# Patient Record
Sex: FEMALE | Race: BLACK OR AFRICAN AMERICAN | NOT HISPANIC OR LATINO | Employment: UNEMPLOYED | URBAN - METROPOLITAN AREA
[De-identification: names, ages, dates, MRNs, and addresses within clinical notes are randomized per-mention and may not be internally consistent; named-entity substitution may affect disease eponyms.]

---

## 2023-07-03 ENCOUNTER — VIRTUAL VISIT (OUTPATIENT)
Dept: PSYCHIATRY | Facility: CLINIC | Age: 33
End: 2023-07-03
Payer: COMMERCIAL

## 2023-07-03 DIAGNOSIS — F29 PSYCHOSIS, UNSPECIFIED PSYCHOSIS TYPE (H): Primary | ICD-10-CM

## 2023-07-03 NOTE — PROGRESS NOTES
[facility] Clinician Phone Screen  A Part of the [facility] First Episode of Psychosis Program    Patient: Laura Gibbons (1990, 32 year old)     MRN: [id number]  Date:  7/03/23  Clinician: LORELEI Woo     Length of Actual Contact: Start Time: 11:35; End Time: 12:02    Use the following script to set-up intentions for this appointment:    You may have heard this when you scheduled this phone call but as a reminder, this 30 minute appointment is used to review a few questions to determine if you would be a candidate for our First Episode Psychosis Programs assessment process. Our assessment process includes 2-3 additional  appointments, spread out over several weeks. Eligibility for enrollment and our treatment recommendations will be discussed after those appointments. By the end of the phone call, I hope to determine if you/Pt is eligible for the full assessment appointment process, which we will schedule at the end of this call. This is not an intake and enrollment is not guaranteed.     We also want to be  transparent about our waitlist. Recent wait lists have been 1-2 months.   With knowing this information, do you still want to proceed with this phone screen? Yes    Reviewed limits to confidentiality: Yes    Use the following script to describe limits to confidentiality if meeting with the patient/family:   Before we get started I always like to review confidentiality. All of the information you share will be kept confidential. Only with permission will information be released to anyone outside of the organization except when required by law. Those legal exceptions are if there is clear and imminent danger to you or someone else, if there is a reasonable suspicion that child or elder is being abused, or if there is a court order. Do you have any questions about confidentiality before we get started?    Phone screen completed with:  Laura; Relation to the patient: [phone]  If we move forward with  "scheduling appointments, who should we coordinate appointments with? Laura, Phone: [phone]  Is it OK to leave a detailed voicemail? Yes  If we move forward with scheduling appointments via video, where would you like the link sent?    Demographics: (Verify the following is correct. If incorrect, edit in Demographics.)  What is patient's phone number: [phone] (home)   Patient's Address?  [address] [place] [address]  Patient's Email Address?  [email or web address]    If caller is not the patient, is the patient aware of the referral? Yes    If age 18 or older, does the adult patient consent to this referral and is the patient willing to attend appointments? Yes    Diagnostic Information:  Briefly, in a few sentences, what would you/the patient like to be seen for?  \"I'm hoping to be seen for my health, to just make sure that I'm healthier and able to communicate with others. I want to get my life back in order.     Have you/the patient experienced symptoms of psychosis? Yes  If yes, for how long and please describe? \"It was just starting in March 2023, but now I'm on meds and doing much better. I was admitted because I couldn't function anymore.\"   In particular, are you/the patient experiencing/have experienced any of the following?   -Changes in thinking (odd ideas, grandiosity, suspiciousness, difficulty concentrating): Yes - \"I went through that in the past, but not right now. I just had some anxiety, worried about people and my safety. It was at the hospital earlier this year.\"   -Changes in perception (auditory/visual/tactile/olfactory abnormalities): No  -Changes in speech (disorganized communication, tangential speech): Yes - \"I experienced that in the hospital because I had memory loss.\"   -Emotional changes (depression, mood swings, irritability, flat affect): Yes - \"I was facing that earlier.\"   -Dramatic reduction of overall functioning: Yes - \"I couldn't do my business or function " "fully.\"     What mental health diagnosis(es) have you/the patient received in the past?   Schizophrenia catatonic     In particular, have you/the patient ever been diagnosed with:   -Autism spectrum disorder: No   -Borderline personality disorder: No    Any history of developmental delays?  No    If yes, please describe:     Are any of the following applicable to the patient?   -IQ below 70? No  -Non-verbal due to developmental delays? No  -Living in a group home because of developmental disability? No  -Has a guardian because of a developmental disability? No    Service History:   Are you/the patient taking antipsychotics or have you/the patient taken antipsychotics in the past? Yes            If yes, for how long cumulatively? Invega and Cogentin, started taking in May 2023    Are you/the patient seeing any mental health providers currently? Yes              If yes, who/where? Therapist and Psychiatry [facility]    Specifically, have you/the patient had an ACT team in the past?  No    Have you been enrolled in a first episode psychosis outpatient program before, such as [facility], HOPE Program at [facility], or at the [facility] in [place]? No     Any current thoughts of harming yourself or others? No    What services are you/the patient interested in receiving in our clinics?   Medication management: Yes   Individual therapy: Yes   Family therapy: Yes   Group therapy: Yes   Work/school support: Yes    Research:  If talking with the patient directly, would you be interested in learning more about research opportunities you may qualify? If so, we can connect you with a team member for more information. Yes      Plan:  Is this patient eligible for a comprehensive assessment with First Episode of Psychosis Services? Yes    Eduardo Sanchez,     Thank you for your interest in our first episode of psychosis services. As discussed, it seems you might be eligible for one of " our first episode of psychosis programs. Therefore, you were offered a series of assessment appointments (details below). Please note, enrollment in one of our first episode psychosis programs is dependent on the outcome of these assessments. These appointments are not considered an intake into a program and enrollment is not guaranteed. Additionally, if eligible there might be wait times for enrollment into our programs. Wait times would be discussed with you during your feedback appointment. If you already have established care with community providers, continue to work with those providers until you have appointments with our program. If you do not have care established elsewhere, please consider establishing care in the interim. If you requested information on other community resources outside of our organization, those are listed below.     First Episode Psychosis Assessment Appointments:     -Diagnostic Assessment appointment with Nicole Amador and Josie Day on 7/18/23 at 9:30 for 90 minutes via video. If via video, the video visit link will be sent via this email address.  A diagnostic assessment is a comprehensive structured assessment that is completed with everyone seeking services in our clinic. It helps us get to know people and determine what services might be the best fit. During this appointment you and the provider will review your social, medical, and psychiatric history.     -Feedback appointment with Nicole Amador for 30 minutes will be scheduled at your Diagnostic Assessment appointment.   A feedback appointment is where you will hear about professional impressions and treatment recommendations.     In preparation for future appointments we ask that all behavioral health records be faxed to [phone] (for adult patients) / [phone] (for child/adolescent patients).    If you have follow-up questions or need to cancel/reschedule appointments, please contact one of our clinics at  [phone] (for adult patients) / [phone] (for child/adolescent patients).    As a reminder, if you need urgent help, please call your local crisis number, 911, or go to your nearest ED. A list of crisis hotlines has been included below. Additionally, we have a new mental health emergency area at [facility] called [facility] that is very calming and helpful if you need additional support. ([address] [place] [address] [phone]). This is a team of mental health providers who can assess your needs and connect you with resources and medication in a timely manner and provide transitional support until obtaining a consistent provider ([facility]- [phone]).    Crisis Resources:  Crisis Hotlines:  National Suicide Prevention Lifeline at 988  Throughout  [place]: call **CRISIS (**658217)  Crisis Text Line: is available for free, 24/7 by texting [place] to 912096  With Lifeline Chat as option - connect with a counselor for emotional support and other services via web chat https://suicidepreventionlifeline.org/chat/    [place] (Child & Adult): [phone]   [place] (Child & Adult): [phone]   [place] (Child & Adult): [phone]   [place] - Child: [phone]   [place] - Adult: [phone]  [place] - Child: [phone]   [place] - Adult: [phone]  [place] (Child & Adult): [phone]   [place] (Child & Adult): [phone]  [facility] Crisis Response ([facility], [place], [place], [facility] and [facility]): [phone]    [facility] Crisis Services Fact Sheet: [email or web address]/wp-content/uploads/sites/[email or web address]/06/Gciley-Xquapc-Ktgdznvj__[email or web address].pdf    Behavioral Health Emergency Room  [facility]  Phone: [phone] (Emergency Room)  Address: [address] [place] [address]    [facility] EmPATH (Emergency Psychiatric Assessment, Treatment, and Healing) is like an emergency room mental health unit. [facility] is an  innovative approach to emergency mental health care that is designed to guide people safely through a crisis while helping them build coping skills for the future. The unit is designed for acute psychiatric patients to receive assessment and evaluation in a therapeutic and least restrictive setting.    Complementing the emergency department, the new adult EmPATH unit provides a calm and comforting environment, allowing the movement and human interaction that is vital in the first 24 hours of treatment. After a short medical evaluation in the emergency department, patients come to a calming, living room-style open space with comfortable recliners and self-serve refreshment stations. Open nursing stations and unlocked rooms create an atmosphere of trust and allyship with the staff, while the mix of daylighting, views to nature, or appropriate imagery establishes a sense of hope. In addition to providing a conducive environment for treatment, the EmPATH unit provides a gentle and benign setting for the care team to constantly evaluate a patient and discharge them with an appropriate treatment plan.    JOE WooSW

## 2023-07-18 ENCOUNTER — VIRTUAL VISIT (OUTPATIENT)
Dept: PSYCHIATRY | Facility: CLINIC | Age: 33
End: 2023-07-18
Payer: COMMERCIAL

## 2023-07-18 DIAGNOSIS — F29 PSYCHOSIS, UNSPECIFIED PSYCHOSIS TYPE (H): Primary | ICD-10-CM

## 2023-07-18 DIAGNOSIS — F39 UNSPECIFIED MOOD (AFFECTIVE) DISORDER (H): ICD-10-CM

## 2023-07-18 NOTE — PROGRESS NOTES
"[facility]  Diagnostic Assessment  A part of the [facility] First Episode of Psychosis Treatment Program    Laura Gibbnos N# [id number]   Age: 32 year old YOB: 1990     Date:  7/18/23    Video- Visit Details   Type of service:  video visit  Video start time: 0931  Video end time: 1035  Originating location (patient location):  [facility]   Location- Home  Distant Site (provider location): HIPAA compliant location Off-site  Platform used for video visit:  Secure real time interactive audio and visual telecommunication system via [employer]     Attendees: Patient attended the session alone  : Aisha English    Contributors to the Assessment   Chart Reviewed.   Interview completed with Laura.    Collateral information obtained from chart review.    Diagnostic assessment today was completed by Nicole CHUA.     Chief Complaint   \"I want to be connected to programs that can help me. I know you could potentially help me and I want a community of people to get care that I need. I'm hopeful that there could be a program that works for me, virtually on Tuesdays and Thursdays. I've heard there are resources and education. I want to be able to find something that.\"    History of Present Illness    Laura Gibbons is a 32 year old patient who prefers the name Laura and uses pronouns she, her. Laura presents for evaluation at [facility] for services to treat first episode psychosis.  Discussed limits of confidentiality today and status as a mandated .     Gender/pronouns: female she/her  Race:  (Swiss)  Sexual Orientation: straight    Therapist: Therapist at [facility]  PCP: No primary care provider on file.  Other Providers: Psychiatry at [facility]    Referred by Therapist referred for evaluation of possible psychosis  .     patient provided assessment details, they were a limited historian.     Per patient's report:  Laura does not currently feel depressed and reports never " "having felt depressed. She has never felt depressed or had a diagnosis of depression in the past. Laura reports that this first episode of psychosis is her only event of mental illness. She does not have any suicidal ideation and has never had any. She has never had a suicide attempt. There is no history of mental illness known in the family. No history of eduardo reported. No trauma reported, however she was placed in restraints multiple times in the hospital and had to do electroconvulsive therapy.     She stated that she was brought into the hospital for acting \"out of the ordinary\" and brought this up when Mi Day asked about heightened activity in the Bipolar section of the MINI questionairre. When discussing the psychosis section, Laura reported that she \"was told she was paranoid\" but she does not remember but she is currently not paranoid. She does not remember much of the episode at all. She was told that she heard voices in the hospital. She is not currently hearing AH or VH, but she was told that in the hospital she did.     Her medication she began in the hospital has been very helpful. She reports no psychosis sx currently. She has never experienced psychosis before. She was told that she had bizarre behavior a few days before she was hospitalized, not 2-3 weeks before as was reported in hospital notes. 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     Laura would like to work with someone of shared identities but is open to working with anyone.      Per medical records:  Efren Davis MD - 05/15/2023 10:20 AM CDT  Formatting of this note is different from the original.  Images from the original note were not included.  [facility]   PSYCHIATRY DISCHARGE SUMMARY    Admission Date and Time: 4/8/2023 8:56 PM   Discharge Date: 5/15/2023  Attending Practitioner: Ryan, " Efren GRIDER MD    Discharge Diagnoses   Unspecified psychotic disorder  Agitated catatonia    Reason for Hospitalization   History of Present Illness taken from admission note:   Laura Asif is a 32 y.o. Female who has been admitted for psychosis from home via ED. The patient is being admitted on a 72 hour hold status. The patient carries no previous past mental health diagnosis. Symptoms of the patient's current psychiatric condition include paranoia, apparent responding to internal stimuli, bizarre accusations against relatives.     Per ED notes: The patient is a 32 y.o. female who comes to the ED with friend/family.  Per ED Triage RN; Pt's family came to triage this morning (4/8) around 0830 stating that pt has been acting abnormal, talking to things that are not there, yelling out the window, and other behaviors. Pt refusing to leave car, I approached her in the car, pt states she will come into the ER, walked into the waiting room with steady gait, A&O x4, flat affect and withdrawn. Once walking in she turned around and walked back to the car and got inside. Refusing to talk with staff after that. Family denies suicidal or homicidal behavior. Family kept car parked out front of ED for multiple hours, triage officer approached car to check on them. Officer reported that pt was crawling over family attempting to get out. Per officer the pt got out of the car and was acting erratic. Pt was in the waiting room vestibule trying to get away from the officer, pt is now refusing to speak, will sit in a wheelchair then attempt to get up when moving. Pt was placed in a bed and brought back to a room for a provider assessment.    Per ED Provider, Amina Zafar PA-C; HPI Laura Asif is a 32 y.o. old female, with no diagnosed medical problems, who was brought in by 2 brothers with a report that she has had strange behavior for the past 2-3 weeks but potential since November of 2022 when she may have had a seizure and hit  her head. They brought her here this morning by private vehicle at approximately 8:00 am today and have been trying to get her to come in from the car since that time. She was brought back from triage, after she attempted to get out of the vehicle, restrained [facility] and [facility] [place] [facility]. The triage RN reports that she was speaking and answering questions initially, but she is not willing to answer question on my initial evaluation. She was placed in a restraint chair and brought into Pod G Rm 8. She gave yes an no answers to questions. She denied SI, HI, AH, or VH. She denied HA, chest pain, SOB, abdominal pain, nausea, vomiting, diarrhea, cough, or fever.    Collateral was obtained from her brothers. They report that her  reported that she had a seizure while out of state for a wedding and that she hit her head on a bathtub of toilet. They did not thinks she had a LOC. She declined to be evaluated for this. She has had some behavior changes since then (November 2022) and over the past 2-3 weeks they have witnessed her pacing, praying a lot, saying things that don't make sense, saying her friends are out to get her, and accusing them of being pedophiles. She has attempted to leave the house in the middle of the night last night and other nights. She made a statement about having a demon inside. She isn't sleeping. The  is at home with their 4 y.o. and 1 y.o. children. The brothers report that the  said that she hasn't been menstruating and is concerned about this. She is breast feeding. No concerns about harm or neglect of the children. They deny any alcohol or recreational drug use history or concerns. Report a FH of another brother who there is concern for psychosis.    Per ED SW Colleague LORELEI Medina: Of note, pt's presentation in the ED has been agitated and not directable. She has required use of the restraint chair twice. She is shouting at people can calling  "them pedophiles saying \"I know what happened to those kids.\" She appears distressed and is visibly shaking while sitting in the restraint chair. She has required forced medications on two occasions. She is still waiting for medical clearance and has been unable to be assessed by social work at this time.     Pt has been medically cleared. Pt is unable to participate in a crisis assessment.    Information from collateral (include names and phone numbers)   Spoke in person with pt's brothers who accompanied her to the ED. Maria Guadalupe ([phone]) and Luis Miguel ([phone]). Maria Guadalupe says this morning pt agreed to come to the hospital. However, when they arrived she refused to get out of the car. He says they were in the car in front of the hospital for several hours until finally pt was trying to get out of the car (child locks in the back were on) and security saw the car and came to assist pt inside.    Maria Guadalupe says for the past couple weeks pt's  has been telling pt's brothers about some concerns with pt's behaviors. He says pt has not been sleeping as much, pacing, praying a lot, talking about visions, and being suspicious of family. Maria Guadalupe says he went to see pt yesterday and she was calling her siblings \"pedophiles\" and saying negative things about them that are no true. Maria Guadalupe says later pt would say she never said those things. He says she has seemed suspicious of people and like they are not who they say they are. She has also been more religiously focused and talking about demons.     Maria Guadalupe says throughout the night pt was pacing and trying to leave the house. He says around 4a she finally started to calm and then at 8a she agreed to come to the hospital for testing.     Maria Guadalupe mentions that in November pt had a \"seizure.\" He does not know more about this but says it was witnessed by pt's . He says pt has no history of seizures and has not had any similar episodes since. However, pt " "may have hit her head twice during that episode.     Maria Guadalupe says there are no concerns for pt using alcohol or drugs. He says there is no known family mental health history though there may be a brother with some developing mental health concerns as well. He says pt lives with her  and their two children (4 and 1 year old) and pt continues to care well for both children.  Angelina M Antwon [facility] 4/8/23 3:28PM    Spoke via phone with pt's  Indra ([phone]).  Indra confirms that pt has not been sleeping well. He says for about the past week he is not sure if she is sleeping at all. He says she will stay up late writing and then if she ever goes to bed she is awake and up and moving again before he wakes. Indra says pt is also not eating much, less than normal though typically she does not eat too much anyway.    When asked to further describe the \"seizure\" episode in [place] Indra describes the following. He says they were in a hotel room for a wedding. Pt started to grab at her throat, like she was choking, and made sounds like she was gasping for air. Pt then kind of fell to the floor and bumped her head on the toilet/tub. He says a moment later pt got up and said \"I'm ok I'm ok.\" She told Indra that \"it was spirits trying to choke me but I overcame them.\" He says because she seemed ok and was not bleeding they did not seek medical attention at that time.     Indra says as a culture they are Yarsani and spiritual and it is not uncommon for people to have visions related to their spirituality. He says for this reason her comments did not flag anything to him at the time. However, since then she has talked more about visions and is praying more frequently and it does seem like she is more religiously preoccupied and focused.    Indra says he is concerned that it seems like pt cannot separate reality from visions. He says pt will talk about seeing people in the house. She will try to run out of the house " "for seemingly no reason. She will make comments like \"Oh I see now how everything is connected\" and then call family and friends by different names. She has made comments about getting out of the house because it is burning when this is not the case.    Indra says he is concerned for pt. He does not know what is going on but wants her to get help. He is supportive of whatever is recommended from the ED today.     On staff exam this morning on NE8, pt refuses interview, demanding new doctor. Briefly denies any immediate danger from physical sx or SI/HI and requests writer leave. Appears in no acute distress and grossly neurologically intact. Reports adequate sleep and intake.     Was more cooperative with female  staff on unit--  \"Patient declined to sign ROIs for her brothers and . Patient reported her sister is coming to visit her. Writer plans to meet with the sister for additional information, if possible.  ...  Writer met with patient at bedside. Patient appeared bright and receptive. Writer attempted to ask patient about lactation needs, as the care team wanted to work with her on this while she was admitted. Patient reported her sister is coming to visit her and that she wanted to talk to her first, before anything happens. Writer attempted to review some assessment questions with patient related to symptoms and how she got to [facility]. Patient reported she doesn't remember anything that happened yesterday and that she wanted to talk with her sister before talking to anyone else.\"    Chemical Dependency History, Past Psychiatric History, Family History and Past Medical History: See Admission Note completed by Jareth Tellez on 4/9/2023.    Lab Studies, EKG & other diagnostic testing   Patient had a mild anemia and during the height of her agitated. Had some elevated CK. See the laboratory section of the chart for the remainder of laboratory testing  I attest that this patient received metabolic screening " (including lipid panel, BMI, blood pressure, fasting glucose or HgbA1C) in the last twelve months.    Hospital Course   The multidisciplinary treatment team met (RN, OT, , Physician) on a daily basis to discuss patient care and treatment planning. The psychiatric inpatient setting provided close nursing supervision and access to multiple treatment modalities and programming (group therapy, OT, one-to-one therapy.) Patient support systems such as family, , and other care providers were contacted as appropriate for collateral information and treatment planning.    1. Medication Trials and Changes: Patient initially admitted with a diagnosis of an unspecified psychosis and placed on antipsychotic olanzapine. She did initially show some reduction in paranoia but continued to exhibit agitated bizarre behavior which was consistent with an agitated catatonia. Antipsychotic was discontinued and trial of lorazepam was begun. At approximately the same time Zyprexa was discontinued for agitated catatonia the patient had a very prolonged QT interval. Patient did improve initially with lorazepam trial but eventually became increasingly agitated and was persistently in seclusion or restraints. As a result emergency ECT was begun. Patient had very rapid response to ECT and became much more calm. She had a total of 6 ECT treatments. She continued to show some paranoia and Risperdal was started, because of her prolonged QT in the past Zyprexa was not restarted. She did have fairly significant cognitive side effects related to the ECT which resolved during her hospitalization. She also seemed to have a fair amount of slowing motorically related to Risperdal. She had essentially no memory of the psychotic symptoms which led to hospitalization or the early part of her hospitalization where she was the most ill. Throughout her hospitalization off and on she was adamant that there was nothing wrong with her and  was insistent on going home. Because of her poor insight it was felt that a long-acting injectable may help with compliance after discharge. Both the family and the patient agreed to this and she was begun on Invega Sustenna. Oral Risperdal was discontinued on the day of discharge after she received her 2nd Invega Sustenna injection. Because of the duration of her hospitalization and because of the significant cognitive fact Ativan was tapered down during her hospitalization at the time of discharge she was taking a 0.5 mg 3 times a day she was discharged with 14 days of Ativan.  2. Legal Status: Committed  3. Group Attendance: Initially very poor but eventually attended most groups and cause cooperative  4. Level of cooperation and Medication adherence: Initially was agitated and resistant but again eventually became quite cooperative and calm  5. Change in psychiatric symptoms: Presented with profound psychosis and fairly significant agitation after ECT she had some amount of psychotic symptoms which seemed to resolved with the addition of Risperdal the time of discharge she had some slowing which was felt related to the Risperdal but showed no obvious signs of psychosis in the catatonia was completely resolved.  6. Discharge planning and coordination of care: Fairly frequent coordination with the family both in person and on the telephone related to her ongoing treatment and need for follow-up. Both the patient and her family were agreeable to outpatient psychiatric follow up including the long-acting injectable. During the patient's hospitalization her father did pass away. The family requested that the treatment team not inform her of his death until she could be at home with her family around. There was some discussion about the possibility of worsening symptoms related to this. Was discharged home with her . Family members were living in her home to help assist with the childcare  "responsibilities.    Tobacco: Cessation counseling not applicable. Patient does not use tobacco.    At the time of discharge, there appeared to be no evidence that the patient posed an imminent threat to self or others and a reasonable discharge plan was in place, we determined that the patient had achieved optimal medical benefit from hospitalization and was discharged with follow-up as detailed below.        Psychiatric Review of Systems (Completed M.I.N.I. for Psychotic Disorders: Yes)     DEPRESSION  Past 2 Weeks:  none  Past Episode:  none      SUICIDALITY: Current: No, risk Low  -reports 0% in response to \"How likely are to you to try to kill yourself within the next 3 months on a scale from 0-100%?\"  -denies current SI, denies intent and plan  -denies current SIB/Self Injurious Behavior  -denies current HI    LAURIE/HYPOMANIA  Current Episode:  none  Past Episode:  none    TRAUMA:  none    ALCOHOL & J. NON-ALCOHOL:  See below    PSYCHOSIS:   Present Symptoms:  none  Past Symptoms:  paranoia, delusions, odd beliefs per family/friends, auditory hallucinations, visual hallucinations, disorganized speech, disorganized behavior and negative symptoms (diminished emotional expression)    RULE OUT MEDICAL, ORGANIC OR DRUG CAUSES FOR ALL DISORDERS  During any current disorder or past mood episode, patient reports:  A. Substance use or withdrawal: No  B. Medical illness: No    Past Psychiatric History   Past diagnoses:   Schizophrenia catatonia    Past medication trials:   Invega and Cogentin, started taking in May 2023    Psychiatric Hospitalizations: one; 4/8-5/15/2023  Commitment: Yes , Current [facility] order: Yes   Electroconvulsive Therapy (ECT) or Transcranial Magnetic Stimulation (TMS): Yes ECT, during the hospitalization    Self-Injurious Behavior: Denies  Suicidal Ideation Hx: No  Suicide Attempt- #-:No, most recent- N/A    Violence/Aggression Hx: No    Outpatient Programs & Services [Psychotherapy, DBT, Day " "Treatment, Eating Disorder Tx etc]:   Current:   for [facility]  Therapist   Psychiatrist    Past:  None     Substance Use History (review CAGE-AID):   Caffeine: no caffeine     Tobacco: none  ETOH: never    Cannabis: none          Other Drugs: none  CD treatment hx: Laura has not received chemical dependency treatment in the past. Laura reports no problems as a result of their drinking / drug use.   Current sober supports include family and friends.    Based on the clinical interview, there are not indications of drug or alcohol abuse. Continue to monitor.   Discussed effect of substance use on overall health and how this may contribute to their mental health symptoms.         Social History:    Living situation: Lives in a house with kids and . She has two children, youngest is two. She feels safe at home.  Guns, weapons, or other means to harm oneself in the home? No  Pets at home? No     Relationships: Significant relationships include her , brother, sister, 's parents, mom, dad. Dad passed away very recently, which has been extremely difficult. She grew up being very close to her siblings. She felt safe in her home growing up. On Sundays, they would go to [employer] and have donuts. She has five siblings. She went to boarding school in [employer] which \"was kind of hard because she wasn't around her siblings and felt alone at moments\" but made friends and had good experiences.      Education: Laura's highest level of education is college graduate. BA in public health. Educational goals includes going back to school, hoping to connect to other people. She completed school with a 4.0.     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 Occupational goals include wanting to go back to work outside of her own business.    Finances: Financial considerations include working and family or friend.    Spiritual " "considerations: Patient identifies with camila community.  She is Lutheran.    Cultural influences: Laura describes their race as Black. Laura's primary spoken language is English. Laura identifies their sexual orientation as heterosexual, and their gender as female. Laura prefers female pronouns. She/her. Cultural considerations to take into account when providing treatment include her immediate family being Egyptian, Lutheran beliefs, being a woman, somewhat preferring a provider with shared identities. She was born in [facility], her parents are Egyptian.    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  Exercise and nutrition habits include biking, stretching, and walking.  Self identified strengths are being resilient, doesn't give up, being really smart.  Coping mechanisms include praying, meditating, exercise, stretches.     Legal Hx: No: Patient denies any legal history     Trauma and/or Abuse Hx: There are no indications or report of: significant losses, trauma, abuse or neglect. Issues of possible neglect are not present.      Hx: No       Developmental History:   Laura was born without pregnancy or delivery complications.          Family History:   Family history of: none  denies history of completed suicides.         Past Medical History:    There is no problem list on file for this patient.    Primary Care Physician: No primary care provider on file.  Last PCP Appointment Date: unknown    Medical problems: No  Surgical history: This patient has no significant past surgical history  History of seizures or head trauma/loss of consciousness? No  Allergies: Not on File       Medications:   Per chart:  No current outpatient medications on file.       " Most Recent Labs & Vitals (per EPIC):   There were no vitals taken for this visit.    RECENT BRAIN IMAGING:  hospital  Additional Screening / Assessment Measures   PHQ9 was not completed today, 7/18/23  GAD7 was not completed today, 7/18/23  CAGE-AID was not completed today, 7/18/23    Mental Status Exam   Alertness: alert  and oriented  Attention Span and Concentration:  Normal  Appearance: awake, alert and adequately groomed  Behavior/Demeanor: cooperative, pleasant and calm, with good eye contact   Speech: normal and regular rate and rhythm  Language: good. Preferred language identified as English.  Psychomotor Behavior:  normal or unremarkable  Mood: description consistent with euthymia  Affect: appropriate and in normal range and mood congruent; was congruent to mood; was congruent to content  Associations:  no loose associations  Thought Process:  unremarkable  Thought Content:  no evidence of suicidal ideation or homicidal ideation, no evidence of psychotic thought and Appropriate to Interview  Perception: none  Insight: excellent  Judgment: excellent  Impulse Control:  intact  Cognition: does  appear grossly intact; formal cognitive testing was done    Safety: There are notable risk factors for self-harm, including past psychosis. However, risk is mitigated by commitment to family, cultural beliefs, spiritual/Taoism beliefs, sobriety, absence of past attempts, ability to volunteer a safety plan, history of seeking help when needed, future oriented, no access to firearms or weapons, denies suicidal intent or plan, no family history of suicide and denies homicidal ideation, intent, or plan. Therefore, based on all available evidence including the factors cited above, Laura does not appear to be at imminent risk for self-harm, does not meet criteria for a 72-hr hold, and therefore remains appropriate for ongoing outpatient level of care.  Suicidality risk appeared Low.  The patient convincingly denies  suicidality on several occasions. There was no deceit detected, and the patient presented in a manner that was believable.    Safety plan was not discussed and included review of crisis phone numbers. Recommended that patient call 911 or go to the local ED should there be a change in any of these risk factors..   CRISIS NUMBERS Emphasized:  [facility] [phone] (clinic)    [phone] (after hours)  National Suicide Prevention Lifeline: 5-000-475-TALK (730-365-6559)  [employer]/resources for a list of additional resources (SOS)            [facility] - [phone]   Urgent Care Adult [facility]-[phone] mobile unit/ 24/7 crisis line  [facility] -[phone]   COPE 24/7 [place] Mobile Team -[phone] (adults)/ [phone] (child)  Poison Control Center - [phone]    OR  go to nearest ER  Crisis Text Line for any crisis 24/7 send this-   To: [phone]   [facility]) [facility]  [phone]    Provisional Psychiatric Diagnoses   A provisional diagnosis of unspecified psychosis, unspecified mood disorder seems appropriate.   Rule outs to consider include Schizoaffective disorder vs bipolar disorder with psychotic features vs Psychosis due to a medical condition (Seizure) vs Postpartum psychosis    Assessment   Laura is a 32 year old  Black or  Not  or  female with psychiatric history of psychosis who presented for an assessment of psychiatric symptoms by the First Episode of Psychosis program.  Laura was referred by her therapist at [facility].   She has a history of one psychiatric hospitalization(s).  There is no family history of ment.      Today, Laura presents as a rather vague historian who seemed unsure of how to respond to many questions with good insight in their current circumstances. Prodromal symptom timeline is challenging to precisely name due to Laura's lack of memory  from the event and lack of collateral information. Laura reports first onset of psychiatric symptoms at age 32, and psychotic symptoms at age 32. She reports first onset of psychotic symptoms at age 32, three and a half months prior to today's assessment.  The above duration of untreated psychosis was approximately several days per patient report, but collateral report is possibly eight months.  Based on today's assessment past symptoms of mental illness represent unspecified psychosis and unspecified mood disorder. Presenting symptoms appear to include paranoia, catatonia, disorganized movement and speech, odd beliefs. 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  Precipitating factors to aforementioned symptoms seem to be aforementioned stress. Substance use does not seem to be a present concern. She does not admit the aforementioned symptoms are worse with substance use.     Laura s reported symptoms could be consistent with an episode of major depression with psychotic features, bipolar disorder with psychotic features, schizoaffective disorder or a manifestation of positive/negative symptoms in schizophrenia. As this is reportedly Laura s first psychotic episode and she is unable to give a clear history, more time and information is required to distinguish between these conditions. Diagnosis seems supported by patient report, collateral records, and the MINI assessment tool.  Differential diagnosis of Schizoaffective disorder vs bipolar disorder with psychotic features vs Psychosis due to a medical condition (Seizure) vs Postpartum psychosis.  Further diagnostic clarification is needed.  There are medical comorbidities which impact this treatment [ECT and major medical problems] and should continue to be monitored.     Laura has notable strengths, including high intelligence, high academic achievement,  emotional intelligence, good social skills, capacity for insight, motivation for treatment, strong engagement in health care, proven resilience through adversity, opportunities to live a meaningful life, optimism that change can occur, participation in [facility]/spiritual group, healthy diet, community involvement, regular engagement in exercise/physical activity, good coping skills, emotional self-regulation, good problem-solving skills, high self esteem, high distress tolerance, sense of belonging, high quality social relationships, proactive approach to problem-solving, empathy and kindness to others. Due to these strengths, I feel optimistic that Laura will have a positive treatment outcome and I think Laura has the potential to find mental well-being.  Psychosocial factors impacting treatment include grief.  Laura  has evidence of functional impairment including difficulty with work. Goal is to increase their functioning detailed above and assist Laura to make progress towards their goals.  Laura identified the following factors that will help them succeed in recovery include community involvement, exercise routine, camila / spirituality, friends / good social support, family support, insight, intelligence, positive work environment, motivation, strong social skills and work ethic. Things that may interfere with their success include unemployed.     Laura may meet criteria for the psychosis services offered through [facility]. This writer will provide verbal and/or written information about recommended services and programs in the context of treating psychosis during our feedback session next week.     Laura agrees to treatment with the capacity to do so. Agrees to call clinic for any problems. The patient understands to call 911 or come to the nearest ED if life threatening or urgent symptoms present. Please note, writer did not receive all pertinent medical records as of the time of this assessment. Laura did not  "sign BYRON's for additional records.    Billing for \"Interactive Complexity\"?    No    Plan   Next steps include intention of completing a continued multi-disciplinary assessment utilizing today's evaluation. The expertise of a PharmD, Psychologist, and Psychiatric consultation may be next steps. Informed Laura that if deemed appropriate for the First Episode of Psychosis services, care will be provided with goal of reducing distressing symptoms and improving functional recovery.    Medication Management: Laura is not in need of Medication Management.  Medications will be addressed further during an MTM visit and new patient medication evaluation.  Continue to follow recommendations of current outpatient prescriber until recommendations are provided.     Therapy: Laura was interested in meeting with a therapist. Laura would benefit from therapy.   Laura was and Family was interested in participating in the Family PsychoEducation Program.     Supported Employment & Education: Laura is in need of employment and education support.     Case Management: Laura is followed by a .  Case Management is not an identified need at this time. This writer will assist in short-term case management support as needed until care is established with ongoing providers.     Other Psychosocial Supports: Laura is interested in the  Young Adult Group.  Family would benefit from  Family Psychoeducation & Support Group.    Medical Referrals: Neurology     Referral information for the above mentioned supports will be discussed further at our feedback visit.   Without the recommended intervention, Laura is likely to experience possible increase in psychotic symptoms requiring hospitalization.     TREATMENT RISK STATEMENT:  The risks, benefits, alternatives and potential adverse effects have been discussed and are understood by the pt. The pt understands the risks of using street drugs or alcohol. There are no medical " contraindications, the pt agrees to treatment with the ability to do so. The pt knows to call the clinic for any problems or to access emergency care if needed.  Medical and substance use concerns are documented above.     PROVIDER: Nicole CHUA  SUPERVISOR: Lennie MOSELEY

## 2023-07-21 NOTE — PROGRESS NOTES
RAMP DA Consultation Outcome    Patient Name: Laura Webb    Diagnostic Assessment Date: 7/18/23     Discussed information gathered in the diagnostic assessment process in multidisciplinary consultation on 7/21/2023 for the purpose of preparing the DA clinician for a future feedback session.     Diagnostically: A provisional diagnosis of unspecified psychosis, unspecified mood disorder seems appropriate.   Rule outs to consider include Schizoaffective disorder vs bipolar disorder with psychotic features vs Psychosis due to a medical condition (Seizure) vs Postpartum psychosis.    Treatment Recommendations:  - [facility] NAVIGATE (Schedule enrollment with Karen Cao during feedback visit)  - [facility] HOPE Program (To schedule an intake or request additional information, please call [phone]. Fax: [phone])  - [facility] (send referral form to: [email or web address] / Fax [phone] / Phone [phone])  - [facility] (Referral form: [email or web address])   - Neurology referral to assess and give treatment recommendations for reported seizure history    -[facility] (various levels of care) ([email or web address])    As a reminder, the smarphrase FEPRESOURCES identifies psychosis-specific resources and referrals in the community outside of [facility].     LORELEI Monson

## 2023-07-25 ENCOUNTER — VIRTUAL VISIT (OUTPATIENT)
Dept: PSYCHIATRY | Facility: CLINIC | Age: 33
End: 2023-07-25
Payer: COMMERCIAL

## 2023-07-25 DIAGNOSIS — F39 UNSPECIFIED MOOD (AFFECTIVE) DISORDER (H): ICD-10-CM

## 2023-07-25 DIAGNOSIS — F29 PSYCHOSIS, UNSPECIFIED PSYCHOSIS TYPE (H): Primary | ICD-10-CM

## 2023-07-25 NOTE — PROGRESS NOTES
[facility] Clinician Feedback Session  A Part of the [facility] First Episode of Psychosis Program    Patient: Laura Webb (1990, 32 year old)     MRN: [id number]  Date:  7/25/23  Clinician: Nicole Amador      People present:   Writer  Client: Yes     Length of Actual Contact: Start Time: 1300; End Time: 1318     Location of contact:  Originating Location (patient location): Pt home, located in [place], [place]  Distant Location (provider location): Home office, located in [place], [place], using appropriate privacy considerations and procedures  Mode of communication: Amwell  Diagnostically: A provisional diagnosis of unspecified psychosis, unspecified mood disorder seems appropriate.   Rule outs to consider include Schizoaffective disorder vs bipolar disorder with psychotic features vs Psychosis due to a medical condition (Seizure) vs Postpartum psychosis.     Treatment Recommendations:  - [facility] NAVIGATE  - [facility]   - [facility]  - [facility]   - Neurology referral to assess and give treatment recommendations for reported seizure history    Laura Webb is currently participating in outpatient therapy, psychiatry, and case management services with [facility]. They do seem appropriate for [facility] FEP services. This writer has provided clinical impressions, verbal and/or written information about [facility] First Episode Pscyhosis programs in the context of treating schizophrenia spectrum and other disorders associated with psychosis. If Navigate program was identified as an option, this writer discussed Pt's ability to start NAVIGATE with later transfer of care if diagnostic clarity reveals a diagnosis other than a schizophrenia spectrum illness.      Is this patient agreeable to start services with First Episode of Psychosis Services? Yes   If Yes; which program? NAVIGATE      What services is the Pt interested in receiving in our clinics?   Medication  management: Yes   Individual therapy: Yes   Family therapy: Yes   Group therapy: Yes   Work/school support: possibly     Place Pt on waitlist(s)? Yes    With whom can someone follow up for scheduling, etc.? Laura rodrigues    Method of follow up communication preferred? phone      Nicole CHUA

## 2023-08-01 ENCOUNTER — VIRTUAL VISIT (OUTPATIENT)
Dept: PSYCHIATRY | Facility: CLINIC | Age: 33
End: 2023-08-01
Payer: COMMERCIAL

## 2023-08-01 DIAGNOSIS — F29 PSYCHOSIS, UNSPECIFIED PSYCHOSIS TYPE (H): Primary | ICD-10-CM

## 2023-08-04 ENCOUNTER — OFFICE VISIT (OUTPATIENT)
Dept: PSYCHIATRY | Facility: CLINIC | Age: 33
End: 2023-08-04
Payer: COMMERCIAL

## 2023-08-04 VITALS
HEIGHT: 63 IN | BODY MASS INDEX: 25.69 KG/M2 | WEIGHT: 145 LBS | HEART RATE: 87 BPM | TEMPERATURE: 98 F | DIASTOLIC BLOOD PRESSURE: 67 MMHG | SYSTOLIC BLOOD PRESSURE: 100 MMHG

## 2023-08-04 DIAGNOSIS — F29 PSYCHOSIS, UNSPECIFIED PSYCHOSIS TYPE (H): Primary | ICD-10-CM

## 2023-08-04 RX ORDER — PALIPERIDONE PALMITATE 156 MG/ML
INJECTION INTRAMUSCULAR
COMMUNITY
Start: 2023-06-11 | End: 2023-08-04

## 2023-08-04 RX ORDER — BENZTROPINE MESYLATE 0.5 MG/1
0.5 TABLET ORAL 2 TIMES DAILY
COMMUNITY
End: 2023-08-07

## 2023-08-04 ASSESSMENT — ANXIETY QUESTIONNAIRES
7. FEELING AFRAID AS IF SOMETHING AWFUL MIGHT HAPPEN: NOT AT ALL
4. TROUBLE RELAXING: NOT AT ALL
2. NOT BEING ABLE TO STOP OR CONTROL WORRYING: NOT AT ALL
3. WORRYING TOO MUCH ABOUT DIFFERENT THINGS: NOT AT ALL
GAD7 TOTAL SCORE: 0
1. FEELING NERVOUS, ANXIOUS, OR ON EDGE: NOT AT ALL
5. BEING SO RESTLESS THAT IT IS HARD TO SIT STILL: NOT AT ALL
GAD7 TOTAL SCORE: 0
IF YOU CHECKED OFF ANY PROBLEMS ON THIS QUESTIONNAIRE, HOW DIFFICULT HAVE THESE PROBLEMS MADE IT FOR YOU TO DO YOUR WORK, TAKE CARE OF THINGS AT HOME, OR GET ALONG WITH OTHER PEOPLE: NOT DIFFICULT AT ALL
6. BECOMING EASILY ANNOYED OR IRRITABLE: NOT AT ALL

## 2023-08-04 ASSESSMENT — PATIENT HEALTH QUESTIONNAIRE - PHQ9: SUM OF ALL RESPONSES TO PHQ QUESTIONS 1-9: 0

## 2023-08-04 ASSESSMENT — PAIN SCALES - GENERAL: PAINLEVEL: NO PAIN (0)

## 2023-08-04 NOTE — PROGRESS NOTES
"    New Patient Medication Management Evaluation  NAVIGATE Program   A Part of the [facility] First Episode of Psychosis Program     Laura Webb MRN# [id number]   Age: 32 year old YOB: 1990     Date of Evaluation: 8/04/23   60 minute evaluation    Video- Visit Details n/a in-person visit 08/04/23     CARE TEAM:  PCP- No primary care provider on file.    Laura Webb is   a 32 year old patient who prefers the name Laura and uses  pronouns she, her.   History was provided by: patient who was a fair historian.    CHIEF COMPLAINT                                                  \" Want to get better \"   PERTINENT BACKGROUND                        [most recent eval 08/04/23]   No significant psychiatric history until age 32. First episode psychosis with paranoia, VH, and disorganized behavior in the context of stressors (school, father's illness, work, parenting). Possibly exacerbated by head-injury (seizure-like episode with fall ~3 months prior). Initial workup unremarkable. Improvement with ECT and invega sustenna.     Psych pertinent item history includes psychosis , commitment, and ECT    HISTORY OF PRESENT ILLNESS                                                          Most recent history began April 2023, during which time she was involuntary psychiatrically admitted for ~5 weeks for bizarre behavior, talking to things that were not there, purposeless movements, and shouting out.  This was in the context of recent seizure-like activity (fall and bilateral shaking, Nov 2022) and increased stress (school, father's health, parenting, etc). While admitted, she received laboratory workup for first episode psychosis. Continued to exhibit paranoid and suspicious behavior while receiving olanzapine and lorazepam. Intermittently agitated and disorganized (disrobing). [facility] filed and patient received ECT  4/21 - 5/6. Discharged 5/14/23 with blunted affect and no overt psychosis.     Today she " "reports that she is feeling well overall.     Recalls that before recent admission \"[family] didn't have concerns about me, otherwise I would have gone to the hospital sooner. They didn't know that I had fell and had a seizure and if we knew I was having issues we would have gone in.\" Seizure November 2022 (in bathroom, following discussion/argument with family member). Remembers shaking, not being able to control her body. States this was 10-minute duration. Recalls shaking over both sides of her body. \"Might have\" hit head on tub, thinks her hair might have cushioned this. Might have lost consciousness after shaking, recalls barely being able to speak, then  found her. Did not seek medical attention after this. Denies having issues with memory, anger, emotions. States \"I was in school and I graduated with a  4.0. 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 \"I don't remember any of it but my family and friends told me things that happened.\" and \"I just remember after the ECT when I could come home. And the points leading up to needing to go [to the hospital].\" Denies having had strange or frightening thoughts.     Has not noticed side effects from current medications (Invega). Denies current muscle stiffness \"I think that's why they gave me the cogentin.\" No noticeable appetite changes; has noticed modest unintentional weight gain \"I can't say how much I just know that I'm bigger than I was.\" Still fitting into clothes. Denies cognitive slowing nor sedation.      Has not established with a therapist yet.     Never had mental health issues before. Denies diane- or post-partum depression nor anxiety. Thinks this episode was due to \"having a lot going on\" with school, father's worsening health, thinks maybe got overwhelmed.     States pcp is concerned about her pituitary gland, wants more workup. No vision changes.     RECENT PSYCH ROS:   PSYCHOSIS:   none  Examples " "include:   -Paranoia/Suspiciousness: denies  -Delusions Thoughts: denies  -Thought Broadcasting: denies  -Mind Reading: denies  -Thought Insertion: denies  -Delusions of Control: denies  -AH: denies  -VH: denies  -Other Hallucinations: denies  -Disorganized Speech: none  -Disorganized Behavior: none  -Cognitive Difficulties: denies  Depression:   denies  Elevated:   denies  Anxiety:   denies  Trauma: not asked, none per 7/18/23 DA     Adverse Effects:  denies side effects, though thinks benztropine initially started for stiffness (does not recall, meds adjusted during acute ECT series)  Pertinent Negative Symptoms: No   Recent Substance Use:   denies      FAMILY and SOCIAL HISTORY                                               per pt report         See Diagnostic Assessment completed on 7/18/23 on for greater psychosocial details.    Family Hx:  No known family history of mental illness. Father with hypertension, pancreatic cancer (passed while patient was in the hospital). Mom with diabetes.     Social Hx Per 7/18/23 DA:  \"Living situation: Lives in a house with kids and . She has two children, youngest is two. She feels safe at home.  Guns, weapons, or other means to harm oneself in the home? No  Pets at home? No      Relationships: Significant relationships include her , brother, sister, 's parents, mom, dad. Dad passed away very recently, which has been extremely difficult. She grew up being very close to her siblings. She felt safe in her home growing up. On Sundays, they would go to [employer] and have donuts. She has five siblings. She went to boarding school in [facility] which \"was kind of hard because she wasn't around her siblings and felt alone at moments\" but made friends and had good experiences.                   Education: Laura's highest level of education is college graduate. BA in public health. Educational goals includes going back to school, hoping to connect to other people. She " "completed school with a 4.0.      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 Occupational goals include wanting to go back to work outside of her own business.     Finances: Financial considerations include working and family or friend.     Spiritual considerations: Patient identifies with camila community.  She is Druze.     Cultural influences: Laura describes their race as Black. Laura's primary spoken language is English. Laura identifies their sexual orientation as heterosexual, and their gender as female. Laura prefers female pronouns. She/her. Cultural considerations to take into account when providing treatment include her immediate family being Samoan, Druze beliefs, being a woman, somewhat preferring a provider with shared identities. She was born in [facility], her parents are Samoan.     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  Exercise and nutrition habits include biking, stretching, and walking.  Self identified strengths are being resilient, doesn't give up, being really smart.  Coping mechanisms include praying, meditating, exercise, stretches.      Legal Hx: No: Patient denies any legal history      Trauma and/or Abuse Hx: There are no indications or report of: significant losses, trauma, abuse or neglect. Issues of possible neglect are not present.       Hx: No\"    PAST PSYCHIATRIC HISTORY                           Per 7/18/23 DA  \"Past diagnoses:   Schizophrenia catatonia     Past medication trials:   Invega and Cogentin, started taking in May 2023     Psychiatric Hospitalizations: one; 4/8-5/15/2023  Commitment: Yes , Current [facility] order: " "Yes   Electroconvulsive Therapy (ECT) or Transcranial Magnetic Stimulation (TMS): Yes ECT, during the hospitalization     Self-Injurious Behavior: Denies  Suicidal Ideation Hx: No  Suicide Attempt- #-:No, most recent- N/A    Violence/Aggression Hx: No     Outpatient Programs & Services [Psychotherapy, DBT, Day Treatment, Eating Disorder Tx etc]:   Current:   for [facility]  Therapist   Psychiatrist     Past:  None\"     PAST MED TRIALS                                              Medication  Dose   (mg) Effect  Dates of Use   paliperidone 156 mg/mL     melatonin      Lorazepam  0.5 mg tid prn For agitated catatonia  April 2023 (states 2 week duration)   Risperidone   trialed while hospitalized Spring 2023?                   PAST SUBSTANCE USE HISTORY                    Per 7/18/23 DA    \"Caffeine: no caffeine     Tobacco: none  ETOH: never    Cannabis: none          Other Drugs: none  CD treatment hx: Laura has not received chemical dependency treatment in the past. Laura reports no problems as a result of their drinking / drug use.   Current sober supports include family and friends.     Based on the clinical interview, there are not indications of drug or alcohol abuse. Continue to monitor.   Discussed effect of substance use on overall health and how this may contribute to their mental health symptoms.\"    MEDICAL HISTORY  and ALLERGY   ALLERGIES: Patient has no allergy information on record.     There is no problem list on file for this patient.      MEDICAL REVIEW OF SYSTEMS                                                          Neurologic Hx [seizures or head trauma/loss of consciousness etc]:      Pregnant or breastfeeding:  No       MEDICATIONS          No current outpatient medications on file.     VITALS                                                                                             There were no vitals taken for this visit.   MENTAL STATUS EXAM                                         " "                      Alertness: alert  and oriented  Appearance: well groomed  Behavior/Demeanor: cooperative, pleasant and calm, with  fixed  eye contact   Speech: regular rate and rhythm. Some decreased prosody   Language: no problems  Psychomotor:  no fidgeting  Mood:  \"good\"  Affect: restricted; congruent to: mood- no, content- no. Some brightening to humor and affirmations.   Thought Process/Associations: unremarkable  Thought Content:  Reports none;  Denies suicidal ideation  Perception:  Reports none;  Denies auditory hallucinations and visual hallucinations  Insight: good  Judgment: good  Cognition: (6) oriented: time, person, and place  attention span:  adequate for interview  concentration:  adequate for interview  recent memory:  adequate for interview  remote memory:  adequate for interview; autobiographical gap in memory around ECT course  fund of knowledge: average to above-average  Gait and Station: unremarkable     LABS and DATA     No lab results found.  No lab results found.  No lab results found.  No lab results found.    Clinician Rating Form in [facility]  1. Depressed Mood: Ratin  0 Not reported     1 Very mild occasionally feels sad or  down ; of questionable clinical significance   2 Mild occasionally feels moderately depressed or often feels sad or  down    3 Moderate occasionally feels very depressed or often feels moderately depressed   4 Moderately severe often feels very depressed   5 Severe feels very depressed most of the time   6 Very severe constant extremely painful feelings of depression   U Unable to assess        2. Anxiety/Worry: Ratin  0 Not reported     1 Very mild occasionally feels a little anxious; of questionable clinical significance   2 Mild occasionally feels moderately anxious or often feels a little anxious or worried   3 Moderate occasionally feels very anxious or often feels moderately anxious   4 Moderately severe often feels very anxious or often feels " moderately anxious   5 Severe feels very anxious or worried most of the time   6 Very severe patient is continually preoccupied with severe anxiety   U Unable to assess        3. Suicidal Ideation/Behavior: Ratin          0 Not reported     1 Very mild occasional thoughts of dying,  I d be better off dead  or  I wish I were dead    2 Mild frequents thoughts of dying or occasional thoughts of killing self, without plan or method   3 Moderate often thinks of suicide or has though of a specific method   4 Moderately severe has mentally rehearsed a specific method of suicide or has made a suicide attempt with questionable intent to die (e.r. takes aspirins and then tells family)   5 Severe has made preparations for a potentially lethal suicide attempt (e.g acquires a gun and bullets for an attempt)   6 Very severe has made a suicide attempt with an intent to die   U Unable to assess                      4. Elevated/Expansive Mood: Ratin  0 Not at all     1 Very mild questionable; more cheerful than most people in his/her circumstances but of only possible clinical significance   2 Mild brief elevated/expansive mood but only somewhat out of proportion to the circumstances   3 Moderate brief/occasional elevation of mood which is clearly out of proportion to the circumstances   4 Moderately severe sustained/frequent elevation of mood which is clearly out of proportion to the circumstances   5 Severe mood is euphoric most of the time   6 Very severe sustained elevation;  everything is wonderful  almost all of the time   U Unable to assess        5. Hostility/Anger/Irritability/Aggressiveness: Ratin  0 Not at all     1 Very mild occasional irritability of doubtful clinical significance   2 Mild occasionally feels angry or mild or indirect expressions of anger, e.g. sarcasm, disrespect or hostile gestures   3 Moderate frequently feels angry, frequent irritability or occasional direct expression of anger, e.g.  yelling at others   4 Moderately severe often feels very angry, often yells at others or occasionally threatens to harm others   5 Severe has acted on his anger by becoming physically abusive on one or two occasions or makes frequent threats to harm others or is very angry most of the time   6 Very severe has been physically aggressive and/or required intervention to prevent assaultiveness on several occasions; or any serious assaultive act   U Unable to assess        6. Impulsive Behavior: Ratin  0 Not at all     1 Very mild one instance of impulsive behavior which is of doubtful clinical significance   2 Mild occasional impulsive acts, e.g. making phone calls at odd hours   3 Moderate occasional impulsive acts with some potential negative consequence, e.g. leaving work abruptly; changing plans without thinking   4 Moderately severe impulsive acts with definite negative consequences, e.g. overspending on non-essentials; repeated reckless sexual behavior   5 Severe impulsive acts with direct negative consequences, e.g. spends entire income on nonessentials without regard for basic needs   6 Very severe impulsive behavior which is potentially life threatening, e.g. jumps from dangerous height (without suicidal intent) or criminal behavior, e.g. impulsive robbery   U Unable to assess        7. Suspiciousness: Ratin  0 Not present     1 Very mild Seems on guard. Reluctant to respond to some  personal  questions. Reports being overly self-conscious in public   2 Mild Describes incidents in which others have harmed or wanted to harm him/her that sound plausible. Patient feels as if others are watching, laughing, or criticizing him/her in public, but this occurs only occasionally or rarely. Little or no preoccupation   3 Moderate Says others are talking about him/her maliciously, have negative intentions, or may harm him/her. Beyond the likelihood of plausibility, but not delusional. Incidents of suspected  persecution occur occasionally (less than once per week) with some preoccupation   4 Moderately severe Same as 3, but incidents occur frequently such as more than once a week. Patient is moderately preoccupied with ideas of persecution OR patient reports persecutory delusions expressed with much doubt (e.g. partial delusion)   5 Severe Delusional -- speaks of Mafia plots, the FBI, or others poisoning his/her food, persecution by supernatural forces   6 Extremely severe Same as 5, but the beliefs are bizarre or more preoccupying. Patient tends to disclose or act on persecutory delusions.   U Unable to assess        8. Unusual Thought Content: Ratin  0 Not present     1 Very mild Ideas of reference (people may stare or may laugh at him), ideas of persecution (people may mistreat him). Unusual beliefs in psychic green, spirits, UFOs, or unrealistic beliefs in one's own abilities. Not strongly held. Some doubt   2 Mild Same as 1, but degree of reality distortion is more severe as indicated by highly unusual ideas or greater conviction. Content may be typical of delusions (even bizarre), but without full conviction. The delusion does not seem to have fully formed, but is considered as one possible explanation for an unusual experience   3 Moderate Delusion present but no preoccupation or functional impairment. May be an encapsulated delusion or a firmly endorsed absurd belief about past delusional circumstances   4 Moderately severe Full delusion(s) present with some preoccupation OR some areas of functioning disrupted by delusional thinking   5 Severe Full delusion(s) present with much preoccupation OR many areas of functioning are disrupted by delusional thinking   6 Extremely severe Full delusions present with almost   U Unable to assess        9. Hallucinations: Ratin  0 Not present     1 Very mild While resting or going to sleep, sees visions, smells odors, or hears voices, sounds or whispers in the absence  of external stimulation, but no impairment in functioning   2 Mild While in a clear state of consciousness, hears a voice calling the subject s name, experiences non-verbal auditory hallucinations (e.g., sounds or whispers), formless visual hallucinations, or has sensory experiences in the presence of a modality-relevant stimulus (e.g., visual illusions) infrequently (e.g., 1-2 times per week) and with no functional impairment   3 Moderate Occasional verbal, visual, gustatory, olfactory, or tactile hallucinations with no functional impairment OR non-verbal auditory hallucinations/visual illusions more than infrequently or with impairment   4 Moderately severe Experiences daily hallucinations OR some areas of functioning are disrupted by hallucinations   5 Severe Experiences verbal or visual hallucinations several times a day OR many areas of functioning are disrupted by these hallucinations   6 Extremely severe Persistent verbal or visual hallucinations throughout the day OR most areas of functioning are disrupted by these hallucinations   U Unable to assess        10. Conceptual Disorganization: Ratin  0 Not present     1 Very mild Peculiar use of words or rambling but speech is comprehensible   2 Mild Speech a bit hard to understand or make sense of due to tangentiality, circumstantiality, or sudden topic shifts   3 Moderate Speech difficult to understand due to tangentiality, circumstantiality, idiosyncratic speech, or topic shifts on many occasions OR 1-2 instances of incoherent phrases   4 Moderately severe Speech difficult to understand due to circumstantiality, tangentiality, neologisms, blocking, or topic shifts most of the time OR 3-5 instances of incoherent phrases   5 Severe Speech is incomprehensible due to severe impairments most of the time. Many PSRS items cannot be rated by self-report alone   6 Extremely severe Speech is incomprehensible throughout interview   U Unable to assess        11.  Avolition/Apathy: Ratin  0 Not at all     1 Very mild questionable decrease in time spent in goal-directed activities   2 Mild spends less time in goal-directed activities than is appropriate for situation and age   3 Moderate initiates activities at times but does not follow through   4 Moderately severe rarely initiates activity but will passively engage with encouragement   5 Severe almost never initiates activities; requires assistance to accomplish basic activities   6 Very severe does not initiate or persist in any goal-directed activity even with outside assistance   U Unable to assess        12. Asociality/Low Social Drive: Ratin  0 Not at all     1 Very mild questionable   2 Mild slow to initiate social interactions but usually responds to overtures by others   3 Moderate rarely initiates social interactions; sometimes responds to overtures by others   4 Moderately severe does not initiate but sometimes responds to overtures by others; little social interaction outside close family members   5 Severe never initiates and rarely encourages conversations or activities; avoids being with others unless prodded, may have contacts with family   6 Very severe avoids being with others (even family members) whenever possible, extreme social isolation   U Unable to assess        13. Adherence: Days: 0  The longest, continuous amount of time in days, since the last visit when the subject did not take medication      14. EPS Part I: Ratin  Rate Elbow Rigidity for all subjects  0 Normal   1 Slight stiffness and resistance   2 Moderate stiffness and resistance   3 Marked rigidity with difficulty in passive movement   4 Extreme stiffness and rigidity with almost a frozen joint   U Unable to assess            EPS Part 2: Signs of EPS: 0  Are there are other signs of EPS (eg diminished arm swing, postural instability, cogwheeling, tremor, akinesia) present based upon patient report or exam?  0 No   1 Yes      15.  Akathesia: Ratin  0 No restlessness reported or observed   1 Mild restlessness observed; e.g., occasional jiggling of the foot occurs when subject is seated   2 Moderate restlessness observed; e.g., on several occasions, jiggles foot, crosses and uncrosses legs or twists a part of the body   3 Restlessness is frequently observed; e.g., the foot or legs moving most of the time   4 Restlessness persistently observed; e.g., subject cannot sit still, may get up and walk   U Unable to assess      16. Dyskinetic Movement Ratings: Ratin  0 None   1 Minimal, may be extreme normal   2 Mild   3 Moderate   4 Severe   U Unable to assess      SIDE EFFECT ASSESSMENT:  Clinician Rating Form in [facility] - Side Effect Assessment  Address side effects reported by the patient and rate using this scale  0 Not present   1 Minimal, may be extreme normal   2 Mild   3 Moderate   4 Severe   U Unable to assess   P Present, but not related      Feeling dizzy or faint: 0  Blurred vision: 0  Dry mouth: 0  Too much saliva/droolin  Nausea:  0  Constipation: 0  Increased appetite: 0  Weight gain: 0  Weight loss: 0  Feeling tired/fatigue: 0  Daytime sedation: 0  Hypersomnia: 0  Insomnia: 0  Low libido: 0  Other problems with sex: 0  Breast enlargement or discharge:  0  Irregular Menstruation or amenorrhea: 0  Other (please list and rate): 0     RECENT SUBSTANCE USE:  Clinician Rating Form in [facility] - Substance Use Assessment  Alcohol Use Severity: Ratin  0 none   1 use without impairment: drinks but no immediate social or medical impairment   2 use with impairment: e.g. becomes grossly intoxicated; alcohol use or withdrawal compromises school, work or social functioning; alcohol use or withdrawal exacerbates symptoms (e.g. gets depressed when drinking)      Marijuana Use Severity: Ratin  0 none   1 occasional use without impairment: e.g. uses marijuana a few days a month and has no immediate social or medical impairment   2  frequent use without impairment: e.g. uses marijuana several or more days a week but has no immediate social or medical impairment   3 use with impairment: e.g. becomes grossly intoxicated; marijuana use compromises school, work or social functioning; marijuana use exacerbates symptoms (e.g. gets paranoid when using)      Other Drug Use Severity: Ratin  0 none   1 occasional use without impairment: e.g. uses drug(s) a few days a month and has no immediate social or medical impairment   2 frequent use without impairment: e.g. uses drug(s) several or more days a week but has no immediate social or medical impairment   3 use with impairment: e.g. becomes grossly intoxicated; drug use compromises school, work or social functioning; drug use exacerbates symptoms (e.g. gets paranoid when using)       PSYCHOTROPIC DRUG INTERACTIONS   None per micromedex     MANAGEMENT:  team review of regimen    RISK STATEMENT for SAFETY   Laura Webb did not appear to be an imminent safety risk to self or others.  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    Diagnostically challenging given single-episode of mental illness. Differential is broad including psychosis due to medical condition (seizure), bipolar disorder, MDD single episode with psychotic features. Substances do not appear to be a contributing factor. Schizophrenia also considered, though unlikely given rapid-onset of her symptoms and high cognitive and psychosocial functioning following her first episode / hospitalization. Her chart review  and interview today present as most consistent with schizophreniform disorder.     Prognosis is fair-to-good. Rapid onset of symptoms is positive predictive factor for resolution of symptoms / return to baseline.     We discussed this assessment with her today. We discussed our recommendations: to continue current medications, complete her medical / neurological workup to rule-out underlying epileptic and autoimmune etiologies. Recommend continuing long-acting-injectable for now, ideally for 1 year following resolution of her psychotic symptoms. She expressed understanding of this plan.     The above duration of untreated psychosis was approximately 3 weeks.  Prodromal symptoms seem to have been present since unclear.  Laura reports first onset of psychiatric symptoms at age 32, and psychotic symptoms at age 32. The above duration of untreated psychosis was approximately 3 weeks.  Based on today's assessment past symptoms of mental illness represent schizophreniform disorder. Presenting symptoms appear to include paranoia, disorganization, auditory hallucinations. Laura attributes symptoms to stress and head injury.  Substance use does not seem to be a present concern. There are medical comorbidities which impact this treatment [ unspecified seizure-like activity and possible head injury ].       MNPMP review was not needed today.    Laura agrees to treatment with the capacity to do so. Agrees to call clinic for any problems. The patient understands to call 911 or come to the nearest ED if life threatening or urgent symptoms present. Please note, writer did receive all pertinent medical records as of the time of this assessment. Laura did sign BYRON's for additional records.  PLAN                                                                                                 1) Medications  - continue Invega Sustenna 156 mg/mL IM q28 days   - continue benztropine 0.5 mg BID     2) Therapy-  seeing individual  therapist, in process of transitioning to Navigate IRT     3) Next Due   Labs- remaining first episode labs ordere 08/10/23   EKG- last completed 4/20/23, repeat if increasing or adding Qtc- prolonging medications   Rating scales- AIMS: annually    4) Referrals  Medical concerns- seizure history, neurology referral for EEG       5) RTC: q 2 weeks    6) Crisis Numbers:   Provided routinely in AVS     After hours:  [phone]      TREATMENT RISK STATEMENT:  The risks, benefits, alternatives and potential adverse effects have been discussed and are understood by the pt. The pt understands the risks of using street drugs or alcohol. There are no medical contraindications, the pt agrees to treatment with the ability to do so. The pt knows to call the clinic for any problems or to access emergency care if needed.  Medical and substance use concerns are documented above.  Psychotropic drug interaction check was done, including changes made today.    Seen and discussed with my cosigned attending physician.   Lubna Henderson MD     PROVIDER: Yaneth Guzmán MD

## 2023-08-07 ENCOUNTER — MYC REFILL (OUTPATIENT)
Dept: PSYCHIATRY | Facility: CLINIC | Age: 33
End: 2023-08-07

## 2023-08-07 DIAGNOSIS — F29 PSYCHOSIS, UNSPECIFIED PSYCHOSIS TYPE (H): Primary | ICD-10-CM

## 2023-08-09 RX ORDER — BENZTROPINE MESYLATE 0.5 MG/1
0.5 TABLET ORAL 2 TIMES DAILY
Qty: 60 TABLET | Refills: 1 | Status: SHIPPED | OUTPATIENT
Start: 2023-08-09 | End: 2023-08-15

## 2023-08-10 RX ORDER — BENZTROPINE MESYLATE 0.5 MG/1
0.5 TABLET ORAL 2 TIMES DAILY
Qty: 60 TABLET | Refills: 2 | Status: SHIPPED | OUTPATIENT
Start: 2023-08-10 | End: 2023-10-27

## 2023-08-10 RX ORDER — PALIPERIDONE PALMITATE 156 MG/ML
156 INJECTION INTRAMUSCULAR
Qty: 1 ML | Refills: 3 | Status: SHIPPED | OUTPATIENT
Start: 2023-08-10 | End: 2023-08-15

## 2023-08-15 DIAGNOSIS — F29 PSYCHOSIS, UNSPECIFIED PSYCHOSIS TYPE (H): ICD-10-CM

## 2023-08-15 RX ORDER — PALIPERIDONE PALMITATE 156 MG/ML
156 INJECTION INTRAMUSCULAR
Qty: 1 ML | Refills: 3 | Status: SHIPPED | OUTPATIENT
Start: 2023-08-15 | End: 2023-10-27

## 2023-08-15 NOTE — PROGRESS NOTES
NAVIGATE Enrollment  A Part of the [facility] First Episode of Psychosis Program     Patient Name: Laura Webb  /Age:  1990 (32 year old)    Met with patient virtually to discuss Navigate services and enrollment. Discussed the Navigate services listed below and the patient's agreeableness/interest in and goals for each service. Writer provided verbal and/or written information about [facility] NAVIGATE in the context of treating schizophrenia spectrum disorders. Identified ability to start NAVIGATE with later transfer of care if diagnostic clarity reveals a diagnosis other than a schizophrenia spectrum illness. Informed of program expectations to include at minimum monthly psychiatry and psychotherapy visits and the need to transfer previous community services (e.g. med mgmt and psychotherapy) to Navigate providers. Address questions/concerns.     NAVIGATE Services:  -Medication Management (Psychiatry)  -Individual Resiliency Training/IRT (Psychotherapy or Counseling)  -Supported Employment and Education/SEE  -Family Psychoeducation and Support  -Family Peer Support  -Social Work Care Coordination  -Psychometry  -Groups    Plan:  Collaboratively planned to the patient to enroll in Navigate. Patient reported interest in receiving a summary of information discussed today via email ([email or web address]). Will send the following.     Greetings,     It was a pleasure meeting with you to discuss the Navigate program. As discussed, I am sending you a summary listing each team member we discussed, appointments we scheduled, and incoming calls to anticipate. Please call our main clinic at [phone] to schedule/reschedule any visits.     Medication Management  Navigate requires people enrolled in Navigate to attend at least once a month medication management visits with the psychiatrist and transfer any prior medication management services to the Navigate psychiatrist.    The Navigate Psychiatrist is Dr. Wolfe  Arielle who works with multiple psychiatry residents. Your first scheduled medication management visit was 8/4. Please contact clinic at [phone] to schedule a 30 minute follow-up appointment.     Individual Therapy (aka Individual Resiliency Training)  Navigsusana requires people enrolled in [facility] to attend weekly therapy to start and transfer any prior therapy services to the Navigate therapist/Individual Resiliency [place]. Exceptions have been made on a case by case basis for people to have an outside therapist in addition to the Navigate therapist.    The [facility] Individual Therapist/Resiliency Trainer is KVNG Carrera, LORELEI. She will reach out to you to introduce herself and offer an appointment.    Education and Employment Support  The [facility] Supported Education and  is Randi Acuña. This team members does their own scheduling and will reach out to you to introduce themselves and schedule an initial appointment.     Family Support  The [facility] Family Education & Support Clinician is Ashlyn Montoya. This team member does their own scheduling and will reach out to you to introduce themselves and schedule an initial appointment if that interests you. As a reminder, these family visits can occur with or without you present.    The [facility] Family  if Rosalee Murillo CFPS. This team member will reach out to you to introduce themselves and provide family support resources in the community.     There is also an [facility] First Episode Psychosis Family Education & Support Group that is free and open to the public. Family can contact the [facility] at [phone] for more information. You can opt to be added to an email distribution list that is used to provide reminders for when group is meeting.      Psychometry   The [facility] Psychometrist is Josie Day. This team member does their own scheduling and will reach out to  you to introduce themselves and schedule an initial appointment.     Groups  1) Navigate/Strengths Zoom Drop-In Group  at 4:00 PM, virtually. This is a social group that is not billed to insurance. Please contact Flavio Holden, Supported Education &  at [phone] for more information. You can opt to be added to an email distribution list that is used to provide reminders for when group is meeting.     2) First Episode Psychosis Young Adult Group  at 3:00 PM, virtually. This is a group based in cognitive behavioral therapy and is billed to insurance. Please contact the [facility] at [phone] for more information.     Directions to Clinic for In Person Appointments  Navigate is located at the [facility] at [address] [place] [address], [address], [place] [address]. The clinic is located in a multiuse office building near the intersection of [address] [place] [address] and [address]. Parking is free in an open surface parking lot. To locate clinic, take the main elevators to the second floor and follow signs for [address]. The clinic phone number is [phone].    Billing and Financial Resources  [email or web address]   Physicians [phone]  Available Monday through Friday from 8 a.m. - 4:30 p.m.    Crisis Resources  As a reminder, if you have concerns for the safety of yourself or others, contact a crisis hotline, , or visit the nearest Emergency Room. Here are some national and local resources:    Crisis Hotlines:  National Suicide Prevention Lifeline at 988  Throughout  [place]: call **CRISIS (**065504)  Crisis Text Line: is available for free,  by texting [place] to 599049  With Lifeline Chat as option - connect with a counselor for emotional support and other services via web chat https://suicidepreventionlifeline.org/chat/    [place] (Child & Adult): [phone]    [place] (Child & Adult): [phone]   [place] (Child & Adult): [phone]   [place] - Child: [phone]   [place] - Adult: [phone]  [place] - Child: [phone]   [place] - Adult: [phone]  [place] (Child & Adult): [phone]   [place] (Child & Adult): [phone]  [facility] Crisis Response ([facility], [place], [place], [facility] and [facility]): [phone]    [facility] Crisis Services Fact Sheet: [email or web address]-[email or web address]    Behavioral Health Emergency Room  [facility]  Phone: [phone] (Emergency Room)  Address: [facility] [place], [place] [address] [place] [address] EmPATH (Emergency Psychiatric Assessment, Treatment, and Healing) is like an emergency room mental health unit. EmPATH is an innovative approach to emergency mental health care that is designed to guide people safely through a crisis while helping them build coping skills for the future. The unit is designed for acute psychiatric patients to receive assessment and evaluation in a therapeutic and least restrictive setting.    Complementing the emergency department, the new adult EmPATH unit provides a calm and comforting environment, allowing the movement and human interaction that is vital in the first 24 hours of treatment. After a short medical evaluation in the emergency department, patients come to a calming, living room-style open space with comfortable recliners and self-serve refreshment stations. Open nursing stations and unlocked rooms create an atmosphere of trust and allyship with the staff, while the mix of daylighting, views to nature, or appropriate imagery establishes a sense of hope. In addition to providing a conducive environment for treatment, the EmPATH unit provides a gentle and benign setting for the care team to constantly evaluate a patient and discharge them with an appropriate treatment  plan.      Thank you and I look forward to you working with our team!    KVNG Valero, [facility] Hospital[facility]  Navigate

## 2023-08-16 ENCOUNTER — PATIENT OUTREACH (OUTPATIENT)
Dept: PSYCHIATRY | Facility: CLINIC | Age: 33
End: 2023-08-16

## 2023-08-16 ENCOUNTER — TELEPHONE (OUTPATIENT)
Dept: PSYCHIATRY | Facility: CLINIC | Age: 33
End: 2023-08-16

## 2023-08-16 NOTE — TELEPHONE ENCOUNTER
Writer spoke with Laura and set up time to meet virtually tomorrow at 2 pm to discuss the NAVIGATE family education and support program.   *This is not a billable encounter.  Ashlyn Montoya MA, LP

## 2023-08-16 NOTE — PROGRESS NOTES
NAVIGATE Outreach  A Part of the [facility] First Episode of Psychosis Program     Patient Name: Laura Webb  /Age:  1990 (32 year old)    Writer called Pt - no answer, left voice message. Writer left name, phone number and indicated that I am going to be her individual therapist in NAVIGATE. Writer requested a call back to discuss IRT, answer any questions and schedule an initial visit.     LORELEI Carrera  NAVIGATE Family Clinician

## 2023-08-17 ENCOUNTER — VIRTUAL VISIT (OUTPATIENT)
Dept: PSYCHIATRY | Facility: CLINIC | Age: 33
End: 2023-08-17
Payer: COMMERCIAL

## 2023-08-17 DIAGNOSIS — F29 PSYCHOSIS, UNSPECIFIED PSYCHOSIS TYPE (H): Primary | ICD-10-CM

## 2023-08-17 NOTE — PROGRESS NOTES
NAVIGATE Clinician Contact & Progress Note   For Family Education Program    NAVIGATE Enrollee: Laura Webb (1990)     MRN: [id number]  Date:  8/17/23  Diagnosis(es):   psychosis NOS  Clinician: ERNESTOATE Family Clinician, Ashlyn Montoya MA, OBDULIA     1. Type of contact: (majority of time spent)  Family Session via telehealth  Mode of communication: [employer] (HIPAA compliant, secure platform). Family consented verbally to this mode of therapy today.  Reason for telehealth:  preferred mode of intervention    2. People present:   Writer  Client: Yes - Laura  Other: No family present    3. Length of Actual Contact: Start Time: 2:05; End Time: 2:20     4. Location of contact:  Originating Location (patient location):   [place]  Distant Location (provider location): Psychiatry [facility]    5. Did the client complete the home practice option(s) from the previous session: Not Applicable    6. Motivational Teaching Strategies:  Promote hope and positive expectations    7. Educational Teaching Strategies:  Review of written material/education    8. CBT Teaching Strategies:  Provided educatoin    9. Psychoeducational Topic(s) Addressed:  Family Education Orientation & Tip Sheet    10. Techniques utilized:   Present new material    11. Assessment/Progress Note:     Writer met briefly with Laura on this day to introduce the family education and support program [facility]. Laura did not have any family attending on this day and so writer briefly described the benefits of having family involved in treatment including improved recovery outcomes. Writer offered that she meet with family with or without Laura being present. Writer also encouraged Laura that this would be her choice.     Laura noted that she was unsure of her 's schedule and would email writer to schedule for family education and support.     Writer emailed Laura the family education orientation materials and will  wait to hear back from Laura to schedule family education and support meetings.     12. Plan/Referrals:     Will meet with family as schedule allows for evidence based family psychoeducation and therapeutic support aimed at maximizing Laura's opportunity for recovery from psychosis.     *This is not a billable encounter due to the encounter length.     Ashlyn Montoya MA, LP   NAVIGATE

## 2023-08-17 NOTE — PROGRESS NOTES
ERNESTOATE/STRENGTHS Virtual Visit Progress Note  For Supported Employment & Education    NAVIGATE Enrollee: Laura Webb (1990)     MRN: [id number]  Date of Visit: 8/17/23  Contacted: MARTY  Appointment Length: 30 MINUITES    Discussed:   Writer met with Laura Webb for virtual visit check-in. Meeting agenda was an initial visit to learn more about Shana goals.   Laura is currently looking for jobs near her home. She does not have transportation and uses ub[employer]. She has a bachelors degree in Public Health but is looking for anything to get her out and on a routine. She prefers part time right now. She does not have experience in pubic health. She has no preference on pay but does depend on the job location.     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 She would like to learn more about operating a business.    We plan to go over jobs next session    Follow-up: 8/24/23    Randi OROZCOATE/STRENGTHS  Supported Employment & Education

## 2023-08-21 ENCOUNTER — PATIENT OUTREACH (OUTPATIENT)
Dept: PSYCHIATRY | Facility: CLINIC | Age: 33
End: 2023-08-21

## 2023-08-21 ENCOUNTER — TELEPHONE (OUTPATIENT)
Dept: PSYCHIATRY | Facility: CLINIC | Age: 33
End: 2023-08-21

## 2023-08-21 NOTE — PROGRESS NOTES
NAVIGATE Outreach  A Part of the [facility] First Episode of Psychosis Program     Patient Name: Laura Webb  /Age:  1990 (32 year old)    Writer called Pt to discuss IRT and schedule an initial visit. No answer, left voice message introducing self, left contact information, and requested a call back to discuss IRT and schedule a visit.       LORELEI Carrera  NAVIGATE Family Clinician

## 2023-08-21 NOTE — TELEPHONE ENCOUNTER
Writer called patient to discuss her injection.  Patient states that she would like to continue to get her injection at [facility].  She reports having orders there until October and then will need new orders from Dr. Guzmán for November and moving forward.      Routed to Dr. Guzmán

## 2023-08-22 ENCOUNTER — PATIENT OUTREACH (OUTPATIENT)
Dept: PSYCHIATRY | Facility: CLINIC | Age: 33
End: 2023-08-22

## 2023-08-22 ENCOUNTER — ANCILLARY PROCEDURE (OUTPATIENT)
Dept: MRI IMAGING | Facility: CLINIC | Age: 33
End: 2023-08-22
Attending: STUDENT IN AN ORGANIZED HEALTH CARE EDUCATION/TRAINING PROGRAM
Payer: COMMERCIAL

## 2023-08-22 ENCOUNTER — TELEPHONE (OUTPATIENT)
Dept: PSYCHIATRY | Facility: CLINIC | Age: 33
End: 2023-08-22

## 2023-08-22 DIAGNOSIS — F29 PSYCHOSIS, UNSPECIFIED PSYCHOSIS TYPE (H): ICD-10-CM

## 2023-08-22 PROCEDURE — A9585 GADOBUTROL INJECTION: HCPCS | Mod: JZ | Performed by: STUDENT IN AN ORGANIZED HEALTH CARE EDUCATION/TRAINING PROGRAM

## 2023-08-22 PROCEDURE — 70553 MRI BRAIN STEM W/O & W/DYE: CPT

## 2023-08-22 PROCEDURE — 255N000002 HC RX 255 OP 636: Mod: JZ | Performed by: STUDENT IN AN ORGANIZED HEALTH CARE EDUCATION/TRAINING PROGRAM

## 2023-08-22 RX ORDER — GADOBUTROL 604.72 MG/ML
7 INJECTION INTRAVENOUS ONCE
Status: COMPLETED | OUTPATIENT
Start: 2023-08-22 | End: 2023-08-22

## 2023-08-22 RX ADMIN — GADOBUTROL 7 ML: 604.72 INJECTION INTRAVENOUS at 13:40

## 2023-08-22 NOTE — PROGRESS NOTES
NAVIGATE Outreach  A Part of the [facility] First Episode of Psychosis Program     Patient Name: Laura Webb  /Age:  1990 (32 year old)    Writer called Pt in responding to a voice message left for this writer earlier today. Spoke with Pt about scheduling initial IRT visit. Pt denied any questions about IRT. Pt informed writer that she just got a job at [employer] and does not yet know her schedule, will need to get back to this writer with that information. Writer inquired how Pt wants to communicate about scheduling - Pt requested email. Writer sent email to Pt:  Eduardo Sanchez,   Thank you for speaking with me today, and congratulations on your new job! We discussed me emailing you to communicate about scheduling our visits. What days and times will be most open for you to meet with me for individual therapy weekly? Please feel free to either respond to this email or to call me at [phone]. Thank you, and I look forward to working with you.  Warmest regards,   KVNG Carrera, LICSW     LORELEI Carrera  NAVIGATE IRT & Family Clinician

## 2023-08-29 ENCOUNTER — VIRTUAL VISIT (OUTPATIENT)
Dept: PSYCHIATRY | Facility: CLINIC | Age: 33
End: 2023-08-29
Payer: COMMERCIAL

## 2023-08-29 DIAGNOSIS — F29 PSYCHOSIS, UNSPECIFIED PSYCHOSIS TYPE (H): Primary | ICD-10-CM

## 2023-08-29 NOTE — PROGRESS NOTES
Clinician Contact & Progress Note For Individual Resiliency Treatment (IRT)    Participant ID #: Laura aLke Fati    Date: 8/29/2023  Clinician ID#: LORELEI Carrera  Other Staff Attending: ZEN  Secondary Staff ID: ZEN    1. Type of contact: (Choose the one with majority of time spent on)   IRT Session       2. People present: (check all that apply)  Writer and patient    3. Total number of persons who participated in contact: 2    4. Length of Actual Contact: 52 minutes   Record Minutes Travel Time: 0 minutes    5. Location of contact:  Originating Location (patient location): brother's house, located in [place], [place]  Distant Location (provider location): home office, located in [place], [place]  Mode of communication: [employer] (HIPAA compliant, secure platform)    THE FOLLOWING QUESTIONS SHOULD BE COMPLETED AFTER EACH IRT SESSION    6. Did the client complete the home practice option from the previous session:   NA    7. Motivational Teaching Strategies:   Connect info and skills with personal goals.   Promote hope and positive expectations.      8. Educational Teaching Strategies:   Review of written material/education   Relate information to client's experience   Ask questions to check comprehension      9. CBT Teaching Strategies:  Reinforcement and shaping (positive feedback for steps towards goals, gains in knowledge & skills, follow-through on home assignments)      10. Module(s) Addressed:   Module 1 - Orientation    11. Techniques utilized: (choose all that apply)   [place] announced at beginning of session   Present new material   Other techniques (please specify):   -Elicited more details about current and recent circumstances  -Emotional support via active and reflective listening, responding to feelings etc.  -Provided verbal overview of First Episode Program NAVIGATE services  -Provided positive feedback and encouragement   -Supportive counseling      12. Mental Status Exam  ":  Mental Status Assessment:  Appearance:  No apparent distress, Well groomed and Appears stated age  Behavior/relationship to examiner/demeanor:  Cooperative, Pleasant and Passive  Orientation: Oriented to person, place, time and situation  Speech Rate:  Normal  Speech Spontaneity:  Normal  Mood:  \"fine\" and calm  Affect:  Appropriate/mood-congruent  Thought Process (Associations):  Logical and Linear  Thought Content:  no evidence of suicidal or homicidal ideation, no overt psychosis and patient does not appear to be responding to internal stimuli  Abnormal Perception:  None  Attention/Concentration:  Normal and Fair  Language:  Intact  Insight:  Adequate  Judgment:  Adequate for safety      13. Progress Note:   Writer set agenda to include a check in, describe program in general and IRT in particular and answer any questions Pt has. Patient does consent to this agenda.    During check in, assessed symptom presence and potential triggers for patient. Client describes mood as \"fine. She denies SI, denies intent, and denies plan. HI was not present. SIB was not present. Safety plan has not been created in the past. Safety plan was reviewed today and includes family, 911, and local ED as needed.  Patient does not report substance use.  Patient describes sleep as \"good\". With regard to medication adherence, patient reports adherence. Patient reports current symptoms to include none. Patient reported current psychosocial stressors are wanting a job in her field and preferably [employer]. Patient did not identify urgent concerns to be addressed today.     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 Pt reports that things are going well.     Today's IRT session focused on orientation to NAVIGATE and particularly to IRT. Writer reviewed all the roles in NAVIGATE - Pt expressed understanding and declined " offer by writer to assist in setting up appointments with other team members. Pt reports interested in working with SEE, and expressed understanding that medication management and IRT are the requirements for remaining in the program. Pt stated they will reach out to clinic and SEE to schedule future appointments. Pt also reported an interest in  and brother participating in family support and education and agreed to allow family clinician to reach out and call her family members to discuss scheduling options. Writer indicated that I will be sending a form to fill out to allow this communication. Writer dicussed goals and overview of IRT and expectations around IRT visits. Pt expressed understanding and denied any current questions or concerns. Pt confirmed on-going Tuesday at 2:00 IRT visits.      Writer inquired about crisis resources and a plan if the Pt should begin to feel unsafe - Pt agreed to discuss this and stated that she would reach out to family and 911 if she was unsafe. Writer further discussed various crisis resources and requested permission to email these resources - Pt gave permission to email. Writer sent email on 8/30, see email content below:    Eduardo Sanchez,     Thank you for meeting with me yesterday. We discussed me sending an email with some resources and a form.     Firstly, I attached a [facility] Verbal Consent form - please fill this out with your  and brother listed as people we have permission to speak to, then return it to me to have uploaded into your chart. This will allow our family clinician to reach out to your family and work with them on support and education.     Next, I attached the written material that talks through orientation information. This called Module 1 IRT Orientation.     Then I mentioned sending the phone number you can call if you have issues with accessing your [employer] - that is [phone].    Finally, I discussed crisis resrouces. I referenced a  suggested script for calling 911 in relation to a mental health crisis - that is attached here. I also discussed several other crisis resrouces, which I will include below:     Crisis resources:    Immediate safety concerns (medical or safety emergency)?   o Call 911 (consider your specific needs and what should be shared with the dispatcher ahead of time, if needed)      Hospital Emergency Department  o Go to nearest ED if you can travel and there is an immediate concern.  o If possible, go to [facility] emergency department  -  to the EmPATH unit within the ED  - Specialty ED department for persons 18 and older experiencing a mental health crisis, staffed by mental health professionals  - EmPath helps get people stabilize (acute crisis, SI, psychosis, etc.) and get set up with outpatient services if they don t already have them.   - [facility] [phone]  - [address] [place] [address]  - [email or web address]     Crisis lines  o [facility] crisis:   - [phone]   - https://www.[facility].mn./HealthFamily/HandlingEmergencies/Help/Pages/default.aspx   o National Suicide Prevention Hotline:   - 390-602-XJMD (8255)    - OR  new national number - call 988     - https://988lifeline.org/current-events/the-lifeline-and-988/   o [place] Crisis Text Line   - Text  [place]  to 625 778 (to be connected to the [facility] crisis staff)    - https://[place].gov/dhs/partners-and-providers/policies-procedures/adult-mental-health/crisis-text-line/   o [facility] [place] warm line:  provides a cmkr-cr-fnwu approach to mental health recovery, support and wellness. Calls are answered by a team of professionally trained Certified Peer Specialists, who have first-hand experience living with a mental health condition. The Warmline provides a safe, anonymous and confidential environment to connect with people who are here  to listen and help. Open Monday-Saturday, 5:00 to 10:00 p.m.   - Call [phone], or text  Support  to [address].   o Wellness in the [facility], you can call or text from 5:00 p.m. to 9:00 a.m.  -  [phone]       [place] After Hours number:  o [phone]. For evenings and weekends, when NAVIGATE is closed. Ask for the resident on call, they can field medication questions after hours.    I look forward to seeing you next week on Tuesday t 2:00!    Warmest regards,     KVNG Carrera, LORELEI         14. Plan/Referrals:     Next visit scheduled one week from today.     KVNG Carrera, LORELEI  First Episode Psychosis- Navigate Program   Individual Resiliency [place] and Family Clinician

## 2023-09-05 ENCOUNTER — VIRTUAL VISIT (OUTPATIENT)
Dept: PSYCHIATRY | Facility: CLINIC | Age: 33
End: 2023-09-05
Payer: COMMERCIAL

## 2023-09-05 DIAGNOSIS — F29 PSYCHOSIS, UNSPECIFIED PSYCHOSIS TYPE (H): Primary | ICD-10-CM

## 2023-09-07 NOTE — PROGRESS NOTES
NAVIGJESUS Clinician Contact & Progress Note  For Individual Resiliency Training (IRT)  A Part of the [facility] First Episode of Psychosis Program    NAVIGATE Enrollee: Laura eWbb (1990)     MRN: [id number]  Date:  9/05/23  Diagnosis: Psychosis, unspecified  Clinician: MILAN Individual Resiliency Trainer, LORELEI Carrera     1. Type of contact: (majority of time spent)  IRT Session via telehealth  Mode of communication: [employer] (HIPAA compliant, secure platform). Patient consented verbally to this mode of therapy today.  Reason for telehealth: COVID-19. This patient visit was converted to a telehealth visit to minimize exposure to COVID-19.    2. People present:   Writer  Client: Yes     3. Length of Actual Contact: Start Time: 2:05; End Time: 2:57     4. Location of contact:  Originating Location (patient location): Pt's home, located in [place], [place]  Distant Location (provider location): Home office, located in [place], [place], using appropriate privacy considerations and procedures    5. Did the client complete the home practice option(s) from the previous session: Nothing Completed    6. Motivational Teaching Strategies:  Connect info and skills with personal goals  Promote hope and positive expectations  Explore pros and cons of change  Re-frame experiences in positive light    7. Educational Teaching Strategies:  Review of written material/education  Relate information to client's experience  Ask questions to check comprehension    8. CBT Teaching Strategies:  Reinforcement and shaping (positive feedback for steps towards goals and gains in knowledge & skills)    9. IRT Module(s) Addressed:  Module 2 - Assessment/Initial Goal Setting    10. Techniques utilized:    [place] announced at beginning of session   Present new material   Other techniques (please specify):   -Elicited more details about current and recent circumstances  -Clarified patient's feelings and  "perspectives  -Emotional support via active and reflective listening, responding to feelings etc.  -Explored aspects of family history/dynamics       -Normalized and validated feelings and experiences  -Provided positive feedback and encouragement  -Provided psychoeducation related to psychosis and related concerns.   -Reinforced the importance of self-care and encouraged continued attention to self-care      11. Measures:    Mental Status Exam  Appearance:  No apparent distress, Well groomed and Appears stated age  Behavior/relationship to examiner/demeanor:  Cooperative, Pleasant and Passive  Orientation: Oriented to person, place, time and situation  Speech Rate:  Normal  Speech Spontaneity:  Normal  Mood:  \"good\" and calm  Affect:  Appropriate/mood-congruent  Thought Process (Associations):  Logical and Linear  Thought Content:  no evidence of suicidal or homicidal ideation, no overt psychosis and patient does not appear to be responding to internal stimuli  Abnormal Perception:  None  Attention/Concentration:  Normal and Fair  Language:  Intact  Insight:  Adequate  Judgment:  Adequate for safety     EMEKA-7  Not completed    PHQ-9  Not completed    [place] Protocol Risk Identification  Not completed    12. Assessment/Progress Note:     Writer set agenda to include a check in, start module on assessment and goal setting. Patient does consent to this agenda.    During check in, assessed symptom presence and potential triggers for patient. Client describes mood as good. She denies SI, denies intent, and denies plan. HI was not present. SIB was not present. Safety plan has been created in the past. Safety plan was not reviewed today.  Patient does not report substance use.  Patient describes sleep as fine. With regard to medication adherence, patient reports adherence and no issues. Patient reports current symptoms to include none. Patient reported current psychosocial stressors are finances/work. Patient did not " "identify urgent concerns to be addressed today.     Discussed updates from patient since last visit. They reported that the job at [employer] did not work out due to an issue during training, so they are looking for work again. Pt and writer briefly discussed work goals and Pt noted that she hopes to reconnect to SEE to discuss. Writer also inquired about medication management visit, offered to assist Pt in scheudling (Pt declined) so writer urged pt to call clinic and schedule. Writer clarified the program requirement of monthly medication management visits as monthly at least. Writer also asked about the consent to communicate form - Pt conformed receiving email with the form and stated she would fill it out and send it to this writer. After that, Pt agreed that family clinician could reach out to  and brother to discuss scheduling for family education and support.     Today's IRT session focused on starting the assessment and goal setting module. Laura defined wellness as \"when my body feels good and I feel healthy.\" Laura noted that she has recently been needing support from her family, and this is a significant change as she has always been a caretaker to others. Writer and Laura discussed resiliency and Laura reflected on her many resilient qualities.  Writer and Laura discussed the Strengths Inventory, which Laura will fill out on her own before next visit. Writer emailed module to Laura after visit.      and Laura reviewed life area satisfaction. Laura noted:  Satisfied with: friendships, meaningful work, leisure activities, family relationships, living situation, spirituality, belonging to community, intimate relationship, creativity education, health, and hobbies.   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    Home practice was identified as complete strengths inventory, fill out and return consent to communicate, call clinic and schedule medication management visit.    13. Plan/Referrals:     Next IRT visit scheduled for one week from today.     Billing for \"Interactive Complexity\"?    No      LORELEI Carrera    NAVIGATE Individual Resiliency Trainer    "

## 2023-09-11 ENCOUNTER — DOCUMENTATION ONLY (OUTPATIENT)
Dept: PSYCHIATRY | Facility: CLINIC | Age: 33
End: 2023-09-11

## 2023-09-11 ENCOUNTER — PATIENT OUTREACH (OUTPATIENT)
Dept: PSYCHIATRY | Facility: CLINIC | Age: 33
End: 2023-09-11

## 2023-09-11 NOTE — PROGRESS NOTES
Sent the following email in preparation for psychometry appt:    Aurelio Sanchez,    Here is the link for the cognitive testing:    [email or web address]    This should take about 15-20 minutes, and can be done on a computer, tablet, or phone! If you could complete this by the time that we meet, that would be great :)     Have a great rest of your day, and I'll see you at our apt!  Josie

## 2023-09-11 NOTE — PROGRESS NOTES
NAVIGATE Outreach  A Part of the [facility] First Episode of Psychosis Program     Patient Name: Laura Webb  /Age:  1990 (32 year old)    Writer responded to email from Pt asking about rescheduling IRT visit due to a scheduling conflict.     LORELEI Carrera Individual Resiliency  & Family Clinician

## 2023-09-13 DIAGNOSIS — F29 PSYCHOSIS, UNSPECIFIED PSYCHOSIS TYPE (H): ICD-10-CM

## 2023-09-13 RX ORDER — BENZTROPINE MESYLATE 0.5 MG/1
0.5 TABLET ORAL 2 TIMES DAILY
Qty: 60 TABLET | Refills: 2 | OUTPATIENT
Start: 2023-09-13

## 2023-09-13 NOTE — TELEPHONE ENCOUNTER
What is the concern that needs to be addressed by a nurse? Patient needs refill for medication... medication to go to [facility] pharmacy     May a detailed message be left on voicemail? yes    Date of last office visit:     Message routed to: mincep

## 2023-09-13 NOTE — TELEPHONE ENCOUNTER
Patient should still have refills on prescription sent on 8/10/23.  Left detailed message stating that patient should already have refills on file on patient's voicemail.

## 2023-09-13 NOTE — TELEPHONE ENCOUNTER
What is the concern that needs to be addressed by a nurse? Patient is completely out of her medication.     May a detailed message be left on voicemail? yes    Date of last office visit:     Message routed to: psych

## 2023-09-14 ENCOUNTER — VIRTUAL VISIT (OUTPATIENT)
Dept: PSYCHIATRY | Facility: CLINIC | Age: 33
End: 2023-09-14
Payer: COMMERCIAL

## 2023-09-14 DIAGNOSIS — F29 PSYCHOSIS, UNSPECIFIED PSYCHOSIS TYPE (H): Primary | ICD-10-CM

## 2023-09-14 NOTE — PROGRESS NOTES
NAVIGJESUS Clinician Contact & Progress Note  For Individual Resiliency Training (IRT)  A Part of the [facility] First Episode of Psychosis Program    NAVIGATE Enrollee: Laura Webb (1990)     MRN: [id number]  Date:  9/14/23  Diagnosis:Psychosis, unspecified  Clinician: MILAN Individual Resiliency Trainer, LORELEI Carrera     1. Type of contact: (majority of time spent)  IRT Session via telehealth  Mode of communication: [employer] (HIPAA compliant, secure platform). Patient consented verbally to this mode of therapy today.  Reason for telehealth: COVID-19. This patient visit was converted to a telehealth visit to minimize exposure to COVID-19.    2. People present:   Writer  Client: Yes -      3. Length of Actual Contact: Start Time: 3:07; End Time: 3:47     4. Location of contact:  Originating Location (patient location): family home, located in [place], [place]  Distant Location (provider location): Home office, located in [place], [place], using appropriate privacy considerations and procedures    5. Did the client complete the home practice option(s) from the previous session: Completed    6. Motivational Teaching Strategies:  Connect info and skills with personal goals  Promote hope and positive expectations    7. Educational Teaching Strategies:  Review of written material/education  Relate information to client's experience  Ask questions to check comprehension  Break down information into small chunks    8. CBT Teaching Strategies:  Reinforcement and shaping (positive feedback for steps towards goals, gains in knowledge & skills and follow-through on home assignments)    9. IRT Module(s) Addressed:  Module 2 - Assessment/Initial Goal Setting    10. Techniques utilized:   [place] announced at beginning of session  Review of homework  Review of previous meeting  Present new material  Summarize progress made in current session  Other techniques (please specify):   -Clarified patient's  "feelings and perspectives  -Elicited more details about current and recent circumstances  -Emotional support via active and reflective listening, responding to feelings etc.  -Identified patient's strengths/needs  -Identified goals for treatment plan  -Normalized and validated feelings and experiences  -Provided verbal overview of First Episode Program NAVIGATE services  -Provided positive feedback and encouragement  -Provided psychoeducation related to psychosis and related concerns.       11. Measures:    Mental Status Exam  Alertness: alert  and oriented  Behavior/Demeanor: cooperative, pleasant and calm  Speech: normal  Language: intact.   Mood: \"okay\"  Thought Process/Associations: unremarkable  Thought Content:  Reports none;  Denies suicidal ideation and delusions  Perception:  Reports none;  Denies auditory hallucinations and visual hallucinations  Insight: adequate  Judgment: good and adequate for safety  Cognition: does  appear grossly intact; formal cognitive testing was not done    EMEKA-7  Not completed. Pt denied anxiety symptoms when asked.     PHQ-9  Not completed. Pt denied depressive symptoms when asked.     [place] Protocol Risk Identification  Pt denied SI when asked.     12. Assessment/Progress Note:     Writer and client met for IRT visit via [employer] Set agenda to check in; assess symptoms and coping; review and discuss goal progress; and continue and expand on IRT modules and material. During check in, client reported that she has had a good week. Reported current symptoms to include: none. Explored symptoms and coping from a stress-vulnerability lens. Current stressors include: wanting to get a job. In regards to medications, client reports: no current concerns. Recently had an issue with refills but that was worked out. Reports no concerns regarding sleep. Substance use: NA. Assessed safety. Client denies SI, denies intent, and denies plan. HI was not present. SIB was not present.  Safety " plan has been created in the past. Safety plan was not reviewed today.Client did not identify urgent concerns to be addressed today.      Continued IRT material on assessment and goal setting. Client appeared engaged in conversation and content. 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 Laura reports that she completed a paper copy of the consent to communicate, would like for family clinician to call her to work out scheduling.     Laura did report that she has experienced multiple traumatic experiences in her past, did not want to discuss in detail.     Regarding medication, Laura noted that she feels that the Invega is doing a good job of controlling symptom and feels that it is beneficial to be able to know that her symptoms won't return. She reports that she had significant side effects, specifically dystonia, for a week after starting the Invega. She was able to eventually get on Cogentin, which has alleviated the issues with dystonia.     Writer and Laura discuss some aspects of her hospitalization, with writer offering some information and normalization of her experiences. Plan to start education about psychosis next visit.     Writer used relational and interpersonal approach to explore feelings, motivations, and behavior. Writer offered support, feedback, validation, and reinforced use of skills taught in IRT from modalities including cognitive behavioral therapy, psycho education, and skills training. Promoted understanding of their experiences of psychosis and how it impacts them in important areas of their life and in recovery goals. Reflected on client's strengths and resiliency factors and facilitated discussion on how these can assist in symptom management, recovery, and well-being.     Home practice identified as: send consent to communicate form in. Work with family clinician to schedule family support visits.     13.  Treatment plan:     [facility] NAVIGATE Program IRT Treatment Plan Summary  A Part of the [facility] First Episode of Psychosis Program       Date of Initial IRT session: 8/29/2023  Date of INTIAL Treatment Plan: 9/14/2023  Last Review/Update Date:  NA  Today's Date: 9/14/2023   Next 90-Day Review Due:  12/13/23    The following represents initial treatment plan.    Individual Resiliency Training Treatment Goals     Measurable Objectives Interventions Target Dates & Discharge Criteria   Individual s Objectives    -Identify psychosocial areas of need  -Identify top 5 strengths and use those strengths when working toward goal achievement; simultaneously choose one area for improvement and identify two actionable steps toward improvement  -Demonstrate understanding of psychosis (paranoia, delusions, odd beliefs per family/friends, auditory hallucinations, visual hallucinations, disorganized speech, disorganized behavior and negative symptoms (diminished emotional expression)) in the context of self with respect to symptoms, causes, course, medications and the impact of stress     -Renew old fun activities (e.g. ceramics classes)    -Obtain/maintain gainful employment       In Laura's own words:  I want a certain peace where I am not worried about it happening again.  IRT/Psychotherapy  -Psychoeducation  -CBT  -Behavioral activation    Coordination with other NAVIGATE Team Members:  - Supported Education and Employment  - Medication Management  - Family Education and Support  - Social Work Care Coordination Target date:   12-36 months from enrollment    Discharge criteria:  Marked and sustained symptom improvement     Laura demonstrates understanding of mental illness     Laura successfully implements strategies to cope with stressors and/or symptoms to mitigate risk for increase in symptom severity or relapse    Gains Made:  -NA       Frequency of sessions and expected duration of treatment:   6-12 months of weekly  "IRT/Individual Psychotherapy followed by 12-24 months of biweekly or monthly IRT    Participants in therapy plan:   [facility]    Support System:  and brother who will participate in family support. Another brother and his wife. A sister. [facility].     See signed Acknowledgement of Current Treatment Plan attached to this visit in patient chart (via \"consents\" tab).    Regulatory Guidelines for Updating Treatment Plan  [place] Medical Assistance: Reviewed & signed at least every 90 days  Medicare:  Update per policy    14. Plan/Referrals:     Next visit scheduled for 9/19 at 2:00.    Billing for \"Interactive Complexity\"?    No      LORELEI Carrera    NAVIGATE Individual Resiliency Trainer    "

## 2023-09-15 ENCOUNTER — TELEPHONE (OUTPATIENT)
Dept: PSYCHIATRY | Facility: CLINIC | Age: 33
End: 2023-09-15

## 2023-09-15 NOTE — TELEPHONE ENCOUNTER
Email sent to patient:     Aurelio Sanchez,    I hope this email finds you well. I am following up from our brief phone call today. I am the family clinician with the NAVIGATE program through the [facility].     I know when we met a few weeks ago we briefly discussed family education and support appointments. These are appointments where I can meet with your loved ones (parents, spouse, etc.) to provide education and support about psychosis. You are also welcome to join these appointments.     I am reaching out to offer to a virtual or in-person appointment for family education and support. I meet with families on Tuesdays, Wednesdays, and Thursdays and my current openings fluctuate a bit from week to week.     I am available to meet next week on Wednesday Sept. 20th at 2 or 3pm or Thursday Sept. 21st at 10 or 11 am. I could do any of these appointments virtual or in person.     If these times do not work, we can also look at the following week. Please feel free to email me back or call me at [phone] to discuss appointment scheduling.    I look forward to hearing from you!    [place],    Ashlyn    *This is a non-billable encounter.  Ashlyn Montoya MA, LP

## 2023-09-19 ENCOUNTER — VIRTUAL VISIT (OUTPATIENT)
Dept: PSYCHIATRY | Facility: CLINIC | Age: 33
End: 2023-09-19
Payer: COMMERCIAL

## 2023-09-19 DIAGNOSIS — F29 PSYCHOSIS, UNSPECIFIED PSYCHOSIS TYPE (H): Primary | ICD-10-CM

## 2023-09-19 NOTE — PROGRESS NOTES
NAVIGJESUS Clinician Contact & Progress Note  For Individual Resiliency Training (IRT)  A Part of the [facility] First Episode of Psychosis Program    NAVIGATE Enrollee: Laura Webb (1990)     MRN: [id number]  Date:  9/19/23  Diagnosis:Psychosis, unspecified  Clinician: MILAN Individual Resiliency Trainer, LORELEI Carrera     1. Type of contact: (majority of time spent)  IRT Session via telehealth  Mode of communication: [employer] (HIPAA compliant, secure platform). Patient consented verbally to this mode of therapy today.  Reason for telehealth: COVID-19. This patient visit was converted to a telehealth visit to minimize exposure to COVID-19.    2. People present:   Writer  Client: Yes -      3. Length of Actual Contact: Start Time: 3:02; End Time: 3:37     4. Location of contact:  Originating Location (patient location): family home, located in [place], [place]  Distant Location (provider location): Home office, located in [place], [place], using appropriate privacy considerations and procedures    5. Did the client complete the home practice option(s) from the previous session: Completed    6. Motivational Teaching Strategies:  Connect info and skills with personal goals  Promote hope and positive expectations    7. Educational Teaching Strategies:  Review of written material/education  Relate information to client's experience  Ask questions to check comprehension  Break down information into small chunks    8. CBT Teaching Strategies:  Reinforcement and shaping (positive feedback for steps towards goals, gains in knowledge & skills and follow-through on home assignments)    9. IRT Module(s) Addressed:  Module 2 - Assessment/Initial Goal Setting    10. Techniques utilized:   [place] announced at beginning of session  Review of homework  Review of previous meeting  Present new material  Summarize progress made in current session  Other techniques (please specify):   -Clarified patient's  "feelings and perspectives  -Elicited more details about current and recent circumstances  -Emotional support via active and reflective listening, responding to feelings etc.  -Identified patient's strengths/needs  -Identified goals for treatment plan  -Normalized and validated feelings and experiences  -Provided verbal overview of First Episode Program NAVIGATE services  -Provided positive feedback and encouragement  -Provided psychoeducation related to psychosis and related concerns.       11. Measures:    Mental Status Exam  Alertness: alert  and oriented  Behavior/Demeanor: cooperative, pleasant and calm  Speech: normal  Language: intact.   Mood: \"okay\"  Thought Process/Associations: unremarkable  Thought Content:  Reports none;  Denies suicidal ideation and delusions  Perception:  Reports none;  Denies auditory hallucinations and visual hallucinations  Insight: adequate  Judgment: good and adequate for safety  Cognition: does  appear grossly intact; formal cognitive testing was not done    EMEKA-7  Not completed. Pt denied anxiety symptoms when asked.     PHQ-9  Not completed. Pt denied depressive symptoms when asked.     [place] Protocol Risk Identification  Pt denied SI when asked.     12. Assessment/Progress Note:     Writer and client met for IRT visit via [employer]. Set agenda to check in; assess symptoms and coping; review and discuss goal progress; and continue and expand on IRT modules and material. During check in, client reported that she has a bit of a harder week, noting that she was struggling some more than previously. Reported current symptoms to include: an increase in memories of her experience with psychosis, including intense emotions that are not typical and a feeling of emotional overwhelm. Client feels that this is due to recent discussion of her experiences of psychosis. Explored symptoms and coping from a stress-vulnerability lens. Client discussed the use of prayer and talking to her " brother, but notes that he brother has been busy recently. Other than these coping strategies, she tries to distract herself and focus on other things. Current stressors include: wanting to get a job. Reports no concerns regarding sleep. Substance use: NA. Assessed safety. Client denies SI, denies intent, and denies plan. HI was not present. SIB was not present.  Safety plan has been created in the past. Safety plan was not reviewed today.Client did not identify urgent concerns to be addressed today.      In regards to medications, client reports: no current concerns. Recently had an issue with refills but that was worked out. Further regarding medication, Laura noted that she feels that the Invega is doing a good job of controlling symptom and feels that it is beneficial to be able to know that her symptoms won't return. She reports that she had significant side effects, specifically dystonia, for a week after starting the Invega. She was able to eventually get on Cogentin, which has alleviated the issues with dystonia.     Continued IRT material education about psychosis. Client appeared engaged in the conversation and content.  Writer provided definition of psychosis, began describing positive symptoms of psychosis, starting with hallucinations. In response to a client question, began to discuss causes of psychosis, including genetics, brain chemistry.    Laura did report that she has experienced multiple traumatic experiences in her past, did not want to discuss in detail.     At 2:37, client reqested we end the visit early. Writer invited Laura to share any feedback about my approach or any improvements I can provide - client denied any feedback to provide and stated that she would just prefer to end the visit a bit early today.     Writer used relational and interpersonal approach to explore feelings, motivations, and behavior. Writer offered support, feedback, validation, and reinforced use of skills taught  "in IRT from modalities including cognitive behavioral therapy, psycho education, and skills training. Promoted understanding of their experiences of psychosis and how it impacts them in important areas of their life and in recovery goals. Reflected on client's strengths and resiliency factors and facilitated discussion on how these can assist in symptom management, recovery, and well-being.     Home practice identified as: Work with family clinician to schedule family support visits.     13. Treatment plan:         14. Plan/Referrals:     Next visit scheduled for next Tuesday at 2:00.    Billing for \"Interactive Complexity\"?    No      LORELEI Carrera    NAVIGATE Individual Resiliency Trainer  "

## 2023-09-22 ENCOUNTER — VIRTUAL VISIT (OUTPATIENT)
Dept: PSYCHIATRY | Facility: CLINIC | Age: 33
End: 2023-09-22
Payer: COMMERCIAL

## 2023-09-22 VITALS — HEIGHT: 63 IN | WEIGHT: 140 LBS | BODY MASS INDEX: 24.8 KG/M2

## 2023-09-22 DIAGNOSIS — F29 PSYCHOSIS, UNSPECIFIED PSYCHOSIS TYPE (H): Primary | ICD-10-CM

## 2023-09-22 ASSESSMENT — PATIENT HEALTH QUESTIONNAIRE - PHQ9: SUM OF ALL RESPONSES TO PHQ QUESTIONS 1-9: 1

## 2023-09-22 ASSESSMENT — PAIN SCALES - GENERAL: PAINLEVEL: NO PAIN (0)

## 2023-09-22 NOTE — NURSING NOTE
NAVIGATE Patient Self-Rating Form    Since your last medication management visit--    Have you been feeling depressed, sad, or down? No  Have you been feeling anxious, worried or nervous? No  Have you been thinking about death or have you had any feelings that you would be better off dead? No   Have you been feeling particularly good? Yes  Have you been feeling annoyed, angry, or resentful (whether you showed it or not)? No  Did you do anything that could have gotten you in trouble? No  Have you felt dizzy or faint? No  Have you had blurred vision? No  Have you had dry mouth? No  Have you had too much saliva in your mouth or had drooling? No  Have you felt nauseous? No  Have you been constipated? No  Has you appetite for food been increased? No  Have you gained weight? No  Have you lost weight? No  Have you felt restless or like you cannot sit still?  A little bit  Any shaking of your hands, legs, or other muscles? No  Any problems walking or moving or any problems feeling stiff or rigid? No  Have you felt tired or fatigued? Yes  Have you felt drowsy during the day? No  Have you been sleeping too much at night? No  Have you been sleeping too little or had problems sleeping at night? No  Any decrease in your interest in sex? No  Any other problems with sex? No  Any problems with your breasts such as swelling or discharge? No  For women, any problems with your period? No  Are there other medical or side effect problems you wish to discuss with your prescriber? No  Since your last visit, how many days have you not taken your medication? 0  Have you had trouble remembering to take your medication? No  Do you find the number of medicines or the times when you are supposed to take then confusing or burdensome? No  Are you afraid of the medication? No  Do you think that you have an illness that requires taking medication? No  Do you think that other people would think poorly of you if they knew that you take  "medication? No  On average, how many cigarettes do you smoke per day? 0  Since you last visit, did you drink alcohol? No  Since your last visit, have you used any marijuana? No  Since your last visit, have you used any stress drugs other than marijuana? No  Between now and your next visit, do you think we should keep your medication the same or consider changing the   medications? Patient said, \" ummm, I think it is ok for now.\"        Is the patient currently in the state of [place]? YES    Visit mode:VIDEO    If the visit is dropped, the patient can be reconnected by: VIDEO VISIT: Text to cell phone:   Telephone Information:   Mobile [phone]       Will anyone else be joining the visit? NO  (If patient encounters technical issues they should call [phone])    How would you like to obtain your AVS? MyChart    Are changes needed to the allergy or medication list? No    Reason for visit: EVER ANTOINEF     "

## 2023-09-22 NOTE — PROGRESS NOTES
"Interval hx    - \"Right now I'm not working\" states \"I'm watching the kids and then I have a couple of days free to myself.\"   - has 2 kids ages 4 & 2, they go to .   - \"we don't really celebrate halloween but my child's birthday is close to that so we focus on celebrating her.\"  - pt had recent birthday, plans to go [place] [email or web address] this [place].   - regarding meds \"initially had like stiff neck and other issues with it but clearly I'm not having any side effects so far.\"   - states not taking cogentin \"I do have it just in case anything pops up where I need it.\" No longer having neck stiffness   - mood has been \"it's been okay. I would say this last week was kind of difficult\" thinks being at home more, not going out has been tough on her mood. Was at home more because kids couldn't go to  on certain days.  - \"I've been sleeping well. A few days I got up a few times, which is normal.\" Estimates 6-9 hours most nights \"I wake up at 9.\" Feels well-rested in the mornings.   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 ***   - no recent falling spells  - we discussed MRI results   - therapy \"it was kind of going okay but it was bringing up traumatizing things from the past, from my hospital stay.\"   ***     ***falling spells   ***MRI  "

## 2023-09-22 NOTE — PROGRESS NOTES
"Virtual Visit Details      Originating Location (pt. Location): Home    Distant Location (provider location):  On-site  Platform used for Video Visit: Paystik    [employer] Medication Management Progress Note  A Part of the [facility] First Episode of Psychosis Program    NAVIGATE Enrollee: Laura Webb (1990)     MRN: [id number]  Date:  9/22/23         Contributors to the Assessment     Chart Reviewed.   Interview completed with Laura Webb.  Collateral information obtained from Laura.         Chief Complaint      \"Okay\"         Interim History      Laura Webb is a 33 year old female who was last seen in MD clinic on 8/4/23 at which time no medication changes were made. The patient reports good treatment adherence.  History was provided by Laura who was a fair historian.  Since the last visit:  - \"Right now I'm not working\" states \"I'm watching the kids and then I have a couple of days free to myself.\"   - has 2 kids ages 4 & 2, they go to .   - \"we don't really celebrate Hhalloween but my child's birthday is close to that so we focus on celebrating her.\"  - pt had recent birthday, plans to go to [employer] this weekend.   - regarding meds \"initially had like stiff neck and other issues with it but clearly I'm not having any side effects so far.\"   - states not taking Cogentin \"I do have it just in case anything pops up where I need it.\" No longer having neck stiffness   - mood has been \"it's been okay. I would say this last week was kind of difficult\" thinks being at home more, not going out has been tough on her mood. Was at home more because kids couldn't go to  on certain days.  - \"I've been sleeping well. A few days I got up a few times, which is normal.\" Estimates 6-9 hours most nights \"I wake up at 9.\" Feels well-rested in the mornings.   - scarves and maternity clothing business, partners with companies.    - no recent falling spells  - we discussed MRI results   - therapy \"it was " "kind of going okay but it was bringing up traumatizing things from the past, from my hospital stay.\"  Encouraged to discuss this with IRT therapist Park.    PSYCH ROS:  Clinician Rating Form in COMPASS  1. Depressed Mood: Ratin  0 Not reported     1 Very mild occasionally feels sad or  down ; of questionable clinical significance   2 Mild occasionally feels moderately depressed or often feels sad or  down    3 Moderate occasionally feels very depressed or often feels moderately depressed   4 Moderately severe often feels very depressed   5 Severe feels very depressed most of the time   6 Very severe constant extremely painful feelings of depression   U Unable to assess        2. Anxiety/Worry: Ratin  0 Not reported     1 Very mild occasionally feels a little anxious; of questionable clinical significance   2 Mild occasionally feels moderately anxious or often feels a little anxious or worried   3 Moderate occasionally feels very anxious or often feels moderately anxious   4 Moderately severe often feels very anxious or often feels moderately anxious   5 Severe feels very anxious or worried most of the time   6 Very severe patient is continually preoccupied with severe anxiety   U Unable to assess        3. Suicidal Ideation/Behavior: Ratin          0 Not reported     1 Very mild occasional thoughts of dying,  I d be better off dead  or  I wish I were dead    2 Mild frequents thoughts of dying or occasional thoughts of killing self, without plan or method   3 Moderate often thinks of suicide or has though of a specific method   4 Moderately severe has mentally rehearsed a specific method of suicide or has made a suicide attempt with questionable intent to die (e.r. takes aspirins and then tells family)   5 Severe has made preparations for a potentially lethal suicide attempt (e.g acquires a gun and bullets for an attempt)   6 Very severe has made a suicide attempt with an intent to die   U Unable to " assess                      4. Elevated/Expansive Mood: Ratin  0 Not at all     1 Very mild questionable; more cheerful than most people in his/her circumstances but of only possible clinical significance   2 Mild brief elevated/expansive mood but only somewhat out of proportion to the circumstances   3 Moderate brief/occasional elevation of mood which is clearly out of proportion to the circumstances   4 Moderately severe sustained/frequent elevation of mood which is clearly out of proportion to the circumstances   5 Severe mood is euphoric most of the time   6 Very severe sustained elevation;  everything is wonderful  almost all of the time   U Unable to assess        5. Hostility/Anger/Irritability/Aggressiveness: Ratin  0 Not at all     1 Very mild occasional irritability of doubtful clinical significance   2 Mild occasionally feels angry or mild or indirect expressions of anger, e.g. sarcasm, disrespect or hostile gestures   3 Moderate frequently feels angry, frequent irritability or occasional direct expression of anger, e.g. yelling at others   4 Moderately severe often feels very angry, often yells at others or occasionally threatens to harm others   5 Severe has acted on his anger by becoming physically abusive on one or two occasions or makes frequent threats to harm others or is very angry most of the time   6 Very severe has been physically aggressive and/or required intervention to prevent assaultiveness on several occasions; or any serious assaultive act   U Unable to assess        6. Impulsive Behavior: Ratin  0 Not at all     1 Very mild one instance of impulsive behavior which is of doubtful clinical significance   2 Mild occasional impulsive acts, e.g. making phone calls at odd hours   3 Moderate occasional impulsive acts with some potential negative consequence, e.g. leaving work abruptly; changing plans without thinking   4 Moderately severe impulsive acts with definite negative  consequences, e.g. overspending on non-essentials; repeated reckless sexual behavior   5 Severe impulsive acts with direct negative consequences, e.g. spends entire income on nonessentials without regard for basic needs   6 Very severe impulsive behavior which is potentially life threatening, e.g. jumps from dangerous height (without suicidal intent) or criminal behavior, e.g. impulsive robbery   U Unable to assess        7. Suspiciousness: Ratin  0 Not present     1 Very mild Seems on guard. Reluctant to respond to some  personal  questions. Reports being overly self-conscious in public   2 Mild Describes incidents in which others have harmed or wanted to harm him/her that sound plausible. Patient feels as if others are watching, laughing, or criticizing him/her in public, but this occurs only occasionally or rarely. Little or no preoccupation   3 Moderate Says others are talking about him/her maliciously, have negative intentions, or may harm him/her. Beyond the likelihood of plausibility, but not delusional. Incidents of suspected persecution occur occasionally (less than once per week) with some preoccupation   4 Moderately severe Same as 3, but incidents occur frequently such as more than once a week. Patient is moderately preoccupied with ideas of persecution OR patient reports persecutory delusions expressed with much doubt (e.g. partial delusion)   5 Severe Delusional -- speaks of Mafia plots, the FBI, or others poisoning his/her food, persecution by supernatural forces   6 Extremely severe Same as 5, but the beliefs are bizarre or more preoccupying. Patient tends to disclose or act on persecutory delusions.   U Unable to assess        8. Unusual Thought Content: Ratin  0 Not present     1 Very mild Ideas of reference (people may stare or may laugh at him), ideas of persecution (people may mistreat him). Unusual beliefs in psychic green, spirits, UFOs, or unrealistic beliefs in one's own abilities.  Not strongly held. Some doubt   2 Mild Same as 1, but degree of reality distortion is more severe as indicated by highly unusual ideas or greater conviction. Content may be typical of delusions (even bizarre), but without full conviction. The delusion does not seem to have fully formed, but is considered as one possible explanation for an unusual experience   3 Moderate Delusion present but no preoccupation or functional impairment. May be an encapsulated delusion or a firmly endorsed absurd belief about past delusional circumstances   4 Moderately severe Full delusion(s) present with some preoccupation OR some areas of functioning disrupted by delusional thinking   5 Severe Full delusion(s) present with much preoccupation OR many areas of functioning are disrupted by delusional thinking   6 Extremely severe Full delusions present with almost   U Unable to assess        9. Hallucinations: Ratin  0 Not present     1 Very mild While resting or going to sleep, sees visions, smells odors, or hears voices, sounds or whispers in the absence of external stimulation, but no impairment in functioning   2 Mild While in a clear state of consciousness, hears a voice calling the subject s name, experiences non-verbal auditory hallucinations (e.g., sounds or whispers), formless visual hallucinations, or has sensory experiences in the presence of a modality-relevant stimulus (e.g., visual illusions) infrequently (e.g., 1-2 times per week) and with no functional impairment   3 Moderate Occasional verbal, visual, gustatory, olfactory, or tactile hallucinations with no functional impairment OR non-verbal auditory hallucinations/visual illusions more than infrequently or with impairment   4 Moderately severe Experiences daily hallucinations OR some areas of functioning are disrupted by hallucinations   5 Severe Experiences verbal or visual hallucinations several times a day OR many areas of functioning are disrupted by these  hallucinations   6 Extremely severe Persistent verbal or visual hallucinations throughout the day OR most areas of functioning are disrupted by these hallucinations   U Unable to assess        10. Conceptual Disorganization: Ratin  0 Not present     1 Very mild Peculiar use of words or rambling but speech is comprehensible   2 Mild Speech a bit hard to understand or make sense of due to tangentiality, circumstantiality, or sudden topic shifts   3 Moderate Speech difficult to understand due to tangentiality, circumstantiality, idiosyncratic speech, or topic shifts on many occasions OR 1-2 instances of incoherent phrases   4 Moderately severe Speech difficult to understand due to circumstantiality, tangentiality, neologisms, blocking, or topic shifts most of the time OR 3-5 instances of incoherent phrases   5 Severe Speech is incomprehensible due to severe impairments most of the time. Many PSRS items cannot be rated by self-report alone   6 Extremely severe Speech is incomprehensible throughout interview   U Unable to assess        11. Avolition/Apathy: Ratin  0 Not at all     1 Very mild questionable decrease in time spent in goal-directed activities   2 Mild spends less time in goal-directed activities than is appropriate for situation and age   3 Moderate initiates activities at times but does not follow through   4 Moderately severe rarely initiates activity but will passively engage with encouragement   5 Severe almost never initiates activities; requires assistance to accomplish basic activities   6 Very severe does not initiate or persist in any goal-directed activity even with outside assistance   U Unable to assess        12. Asociality/Low Social Drive: Ratin  0 Not at all     1 Very mild questionable   2 Mild slow to initiate social interactions but usually responds to overtures by others   3 Moderate rarely initiates social interactions; sometimes responds to overtures by others   4 Moderately  severe does not initiate but sometimes responds to overtures by others; little social interaction outside close family members   5 Severe never initiates and rarely encourages conversations or activities; avoids being with others unless prodded, may have contacts with family   6 Very severe avoids being with others (even family members) whenever possible, extreme social isolation   U Unable to assess        13. Adherence: Days: 0  The longest, continuous amount of time in days, since the last visit when the subject did not take medication      14. EPS Part I: Ratin  Rate Elbow Rigidity for all subjects  0 Normal   1 Slight stiffness and resistance   2 Moderate stiffness and resistance   3 Marked rigidity with difficulty in passive movement   4 Extreme stiffness and rigidity with almost a frozen joint   U Unable to assess            EPS Part 2: Signs of EPS: 0  Are there are other signs of EPS (eg diminished arm swing, postural instability, cogwheeling, tremor, akinesia) present based upon patient report or exam?  0 No   1 Yes      15. Akathesia: Ratin  0 No restlessness reported or observed   1 Mild restlessness observed; e.g., occasional jiggling of the foot occurs when subject is seated   2 Moderate restlessness observed; e.g., on several occasions, jiggles foot, crosses and uncrosses legs or twists a part of the body   3 Restlessness is frequently observed; e.g., the foot or legs moving most of the time   4 Restlessness persistently observed; e.g., subject cannot sit still, may get up and walk   U Unable to assess      16. Dyskinetic Movement Ratings: Ratin  0 None   1 Minimal, may be extreme normal   2 Mild   3 Moderate   4 Severe   U Unable to assess      SIDE EFFECT ASSESSMENT:  Clinician Rating Form in COMPASS - Side Effect Assessment  Address side effects reported by the patient and rate using this scale  0 Not present   1 Minimal, may be extreme normal   2 Mild   3 Moderate   4 Severe   U Unable  to assess   P Present, but not related      Feeling dizzy or faint: 0  Blurred vision: 0  Dry mouth: 0  Too much saliva/droolin  Nausea:  0  Constipation: 0  Increased appetite: 0  Weight gain: 0  Weight loss: 0  Feeling tired/fatigue: 0  Daytime sedation: 0  Hypersomnia: 0  Insomnia: 0  low libido: 0  Other problems with sex: 0  Breast enlargement or discharge:  0  Irregular Menstruation or amenorrhea: 0  Other (please list and rate): 0     RECENT SUBSTANCE USE:  Clinician Rating Form in COMPASS - Substance Use Assessment  Alcohol Use Severity: Ratin  0 none   1 use without impairment: drinks but no immediate social or medical impairment   2 use with impairment: e.g. becomes grossly intoxicated; alcohol use or withdrawal compromises school, work or social functioning; alcohol use or withdrawal exacerbates symptoms (e.g. gets depressed when drinking)      Marijuana Use Severity: Ratin  0 none   1 occasional use without impairment: e.g. uses marijuana a few days a month and has no immediate social or medical impairment   2 frequent use without impairment: e.g. uses marijuana several or more days a week but has no immediate social or medical impairment   3 use with impairment: e.g. becomes grossly intoxicated; marijuana use compromises school, work or social functioning; marijuana use exacerbates symptoms (e.g. gets paranoid when using)      Other Drug Use Severity: Ratin  0 none   1 occasional use without impairment: e.g. uses drug(s) a few days a month and has no immediate social or medical impairment   2 frequent use without impairment: e.g. uses drug(s) several or more days a week but has no immediate social or medical impairment   3 use with impairment: e.g. becomes grossly intoxicated; drug use compromises school, work or social functioning; drug use exacerbates symptoms (e.g. gets paranoid when using)      RECENT SOCIAL HISTORY:  SEE HPI    Medical ROS:  none         First Episode of Psychosis  History      DUP (duration untreated psychosis):  several days to eight months  Route to initial care: hospitalization  Medication adherence overall:  See above, Clinician Rating Form in COMPASS Item 13  General frequency of visits:  monthly  Participation in groups:  No    Reviewed for completion of First Episode work-up:  Yes  First episode workup:  Not Done (if completed, see LABS for results)  MATRICS Consensus Cognitive Battery:  Not Done (if completed, see LABS for results)         Medical/Surgical History     Blood-group specific substance    There is no problem list on file for this patient.           Medications     Current Outpatient Medications   Medication Sig Dispense Refill    benztropine (COGENTIN) 0.5 MG tablet Take 1 tablet (0.5 mg) by mouth 2 times daily 60 tablet 2    paliperidone (INVEGA SUSTENNA) 156 MG/ML TERRELL injection Inject 1 mL (156 mg) into the muscle every 28 days 1 mL 3             Vitals     There were no vitals taken for this visit.          Mental Status Exam     Alertness: alert  and oriented  Appearance: well groomed  Behavior/Demeanor: pleasant and calm, with fair  eye contact   Speech: normal  Language: no obvious problem  Psychomotor: normal or unremarkable  Mood:  OK  Affect: blunted; was congruent to mood; was congruent to content  Thought Process/Associations: unremarkable  Thought Content:  Reports none;  Denies suicidal and violent ideation and delusions  Perception:  Reports none;  Denies auditory hallucinations and visual hallucinations  Insight:  difficult to assess as her memory of certain events is limited  Judgment: good, adequate for safety  Cognition: does  appear grossly intact; formal cognitive testing was not done         Labs and Data     RATING SCALES: AIMS due    PHQ9 TODAY =       8/4/2023    11:00 AM   PHQ-9 SCORE   PHQ-9 Total Score 0       ANTIPSYCHOTIC LABS ROUTINE    [glu, A1C, lipids (focus LDL), liver enzymes, WBC, ANEU, Hgb, plts]   q12 mo  No lab  results found.  No lab results found.  No lab results found.  No lab results found.    Narrative & Impression   EXAM: MR BRAIN W/O and W CONTRAST  LOCATION: [facility]  DATE: 8/22/2023     INDICATION:  Psychosis, unspecified psychosis type (H)  COMPARISON: None.  CONTRAST: 7ml Gadavist  TECHNIQUE: Routine multiplanar multisequence head MRI without and with intravenous contrast.     FINDINGS:  INTRACRANIAL CONTENTS: No acute or subacute infarct. No mass, acute hemorrhage, or extra-axial fluid collections. Normal brain parenchymal signal. Normal ventricles and sulci. Normal position of the cerebellar tonsils. No pathologic contrast enhancement.   Posterior fossa structures including cerebellar hemispheres, fourth ventricle and CP angle cisterns are clear. Nasopharynx is clear. Region of the sella and suprasellar cistern are clear.     SELLA: No abnormality accounting for technique.     OSSEOUS STRUCTURES/SOFT TISSUES: Normal marrow signal. The major intracranial vascular flow voids are maintained.      ORBITS: No abnormality accounting for technique.      SINUSES/MASTOIDS: No paranasal sinus mucosal disease. No middle ear or mastoid effusion.                                                                    IMPRESSION:  1.  Normal head MRI.            Psychiatric Diagnoses     Schizophreniform disorder, with positive prognostic features  H/o catatonia         Assessment     From intake, 8/4/23: Laura Webb is a 32 year old  Black or  Not  or  female who presented for a comprehensive assessment of psychiatric symptoms by the First Episode of Psychosis Navigate Program. Diagnostically challenging given single-episode of mental illness. Differential is broad including psychosis due to medical condition (seizure), bipolar disorder, MDD single episode with psychotic features. Substances do not appear to be a contributing factor. Schizophrenia also  considered, though unlikely given rapid-onset of her symptoms and high cognitive and psychosocial functioning following her first episode / hospitalization. Her chart review and interview today present as most consistent with schizophreniform disorder. Prognosis is fair-to-good. Rapid onset of symptoms is positive predictive factor for resolution of symptoms / return to baseline.   We discussed this assessment with her today. We discussed our recommendations: to continue current medications, complete her medical / neurological workup to rule-out underlying epileptic and autoimmune etiologies. Recommend continuing long-acting-injectable for now, ideally for 1 year following resolution of her psychotic symptoms. She expressed understanding of this plan.      TODAY Laura reports she has been doing well recently. Denies any new mental health symptoms or side effects. She does report stress recently related to remembering what happened while she was in the hospital, which was very traumatizing for her.  In fact, she feels that discussing her experience with psychosis has caused an increase in some re-experiencing and emotional symptoms.  At this point, she states, she would prefer to just move forward rather than looking back and thinking about it.  When discussing test results, I did remind her of the referral to neurology, but she declined this as she states she does not want to do anymore tests or otherwise be reminded of her episode, even when she was counseled that she could have a treatable neurological condition that has not been diagnosed as of yet. It is unclear to me whether this line of reasoning reflects untreated PTSD, denial, anosognosia, or none of these.    PSYCHOTROPIC DRUG INTERACTIONS:   None.  MANAGEMENT:  N/A         Plan   1) Medications  - continue Invega Sustenna 156 mg IM q28 days   - continue benztropine 0.5 mg BID      2) Therapy-  weekly IRT      3) Next Due   Labs- remaining first episode  labs ordered 08/10/23   EKG- last completed 4/20/23, repeat if increasing or adding Qtc- prolonging medications   Rating scales- AIMS: annually     4) Referrals  Medical concerns- seizure history, neurology referral for EEG; she expresses hesitance about this       5) RTC: q 2 weeks     6) Crisis Numbers:   Provided routinely in AVS     After hours:  [phone]    TREATMENT RISK STATEMENT:  The risks, benefits, alternatives and potential adverse effects have been discussed and are understood by the pt. The pt understands the risks of using street drugs or alcohol. There are no medical contraindications, the pt agrees to treatment with the ability to do so. The pt knows to call the clinic for any problems or to access emergency care if needed.  Medical and substance use concerns are documented above.  Psychotropic drug interaction check was done, including changes made today.      PROVIDER:  Yaneth Guzmán MD

## 2023-09-26 ENCOUNTER — VIRTUAL VISIT (OUTPATIENT)
Dept: PSYCHIATRY | Facility: CLINIC | Age: 33
End: 2023-09-26
Payer: COMMERCIAL

## 2023-09-26 DIAGNOSIS — F29 PSYCHOSIS, UNSPECIFIED PSYCHOSIS TYPE (H): Primary | ICD-10-CM

## 2023-09-27 NOTE — PROGRESS NOTES
NAVIGJESUS Clinician Contact & Progress Note  For Individual Resiliency Training (IRT)  A Part of the [facility] First Episode of Psychosis Program    NAVIGATE Enrollee: Laura Webb (1990)     MRN: [id number]  Date:  9/26/23  Diagnosis:Psychosis, unspecified  Clinician: MILAN Individual Resiliency Trainer, LORELEI Carrera     1. Type of contact: (majority of time spent)  IRT Session via telehealth  Mode of communication: American Well (HIPAA compliant, secure platform). Patient consented verbally to this mode of therapy today.  Reason for telehealth: COVID-19. This patient visit was converted to a telehealth visit to minimize exposure to COVID-19.    2. People present:   Writer  Client: Yes    3. Length of Actual Contact: Start Time: 2:22; End Time: 2:59     4. Location of contact:  Originating Location (patient location): family home, located in [place], [place]  Distant Location (provider location): Home office, located in [place], [place], using appropriate privacy considerations and procedures    5. Did the client complete the home practice option(s) from the previous session: Completed    6. Motivational Teaching Strategies:  Connect info and skills with personal goals  Promote hope and positive expectations    7. Educational Teaching Strategies:  Review of written material/education  Relate information to client's experience  Ask questions to check comprehension  Break down information into small chunks    8. CBT Teaching Strategies:  Reinforcement and shaping (positive feedback for steps towards goals, gains in knowledge & skills and follow-through on home assignments)    9. IRT Module(s) Addressed:  Module 2 - Assessment/Initial Goal Setting    10. Techniques utilized:   [place] announced at beginning of session  Review of homework  Review of previous meeting  Present new material  Summarize progress made in current session  Other techniques (please specify):   -Clarified patient's  "feelings and perspectives  -Elicited more details about current and recent circumstances  -Emotional support via active and reflective listening, responding to feelings etc.  -Identified patient's strengths/needs  -Identified goals for treatment plan  -Normalized and validated feelings and experiences  -Provided verbal overview of First Episode Program NAVIGATE services  -Provided positive feedback and encouragement  -Provided psychoeducation related to psychosis and related concerns.       11. Measures:    Mental Status Exam  Alertness: alert  and oriented  Behavior/Demeanor: cooperative, pleasant and calm  Speech: normal  Language: intact.   Mood: \"okay\"  Thought Process/Associations: unremarkable  Thought Content:  Reports none;  Denies suicidal ideation and delusions  Perception:  Reports none;  Denies auditory hallucinations and visual hallucinations  Insight: adequate  Judgment: good and adequate for safety  Cognition: does  appear grossly intact; formal cognitive testing was not done    EMEKA-7  Not completed. Pt denied anxiety symptoms when asked.     PHQ-9  Not completed. Pt denied depressive symptoms when asked.     [place] Protocol Risk Identification  Pt denied SI when asked.     12. Assessment/Progress Note:     Writer and client met for IRT visit via [employer]. Sent link via SMS text at 201. Resent at 2:06. Called at 2:12 - Pt answered, stated that she is out but trying to get home. Writer agreed to keep visit open to check in. Laura joined visit at 2:22. Writer asked Laura if we should focus more on the present and on this writer understanding Laura better, rather that reviewing the education about psychosis - Laura agreed to this. Set agenda to check in; assess symptoms and coping and continue and expand on IRT modules and material. During check in, client reported that she had an all right week, noting that she was still struggling with thoughts of her episode at times, particularly in nightmares, but " that she was feeling better. Reported current symptoms to include: none outside of nightmares about her episode. Explored symptoms and coping from a stress-vulnerability lens. Current stressors include: wanting to get a job. Reports no concerns regarding sleep. Substance use: NA. Assessed safety. Client denies SI, denies intent, and denies plan. HI was not present. SIB was not present.  Safety plan has been created in the past. Safety plan was not reviewed today.Client did not identify urgent concerns to be addressed today.      In regards to medications, client reports: no current concerns. Recently had stopped the cogentin for a time due to running out, but is taking the medication again. While she had stopped, she reports experiencing a stiffness in her back. Since restarting, this has abated somewhat. Further regarding medication, Laura noted that she feels that the Invega is doing a good job of controlling symptom and feels that it is beneficial to be able to know that her symptoms won't return    Continued IRT material, particularly revisiting the strengths assessment. Client appeared engaged in the conversation and content.  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     Laura did report that she has experienced multiple traumatic experiences in her past, and did not want  "to discuss in detail.     Writer used relational and interpersonal approach to explore feelings, motivations, and behavior. Writer offered support, feedback, validation, and reinforced use of skills taught in IRT from modalities including cognitive behavioral therapy, psycho education, and skills training. Promoted understanding of their experiences of psychosis and how it impacts them in important areas of their life and in recovery goals. Reflected on client's strengths and resiliency factors and facilitated discussion on how these can assist in symptom management, recovery, and well-being.     Home practice identified as: Work with family clinician to schedule family support visits.     13. Plan/Referrals:     Next visit scheduled for next Tuesday at 2:00.    Billing for \"Interactive Complexity\"?    No      LORELEI Carrera Individual Resiliency Trainer  "

## 2023-09-28 ENCOUNTER — VIRTUAL VISIT (OUTPATIENT)
Dept: PSYCHIATRY | Facility: CLINIC | Age: 33
End: 2023-09-28
Payer: COMMERCIAL

## 2023-09-28 DIAGNOSIS — F29 PSYCHOSIS, UNSPECIFIED PSYCHOSIS TYPE (H): Primary | ICD-10-CM

## 2023-09-28 NOTE — PROGRESS NOTES
[facility]  Psychiatry Clinic  Psychological Testing by Psychometrist     Laura Webb MRN# [id number]   Age: 33 year old YOB: 1990     Date:  9/28/23    Video- Visit Details   Type of service:  video visit  Video start time:  12:05pm  Video end time:  12:50pm  Originating location (patient location):  [facility]   Location- Home  Distant Site (provider location): HIPAA compliant location Off-site  Platform used for video visit:  Secure real time interactive audio and visual telecommunication system via [employer]     Attendees: Patient attended the session alone  : No, English    Identifying Information/Reason for Referral  Laura Webb is a 33 year old female who prefers the name Laura & pronouns she, her, hers.  Laura has a history of possible psychosis  .  The patient was seen for psychological testing. The purpose of the current psychological evaluation was to provide information about Laura's social, emotional and cognitive functioning, to assist with diagnostic clarification and to guide her treatment and recovery planning. Results of the following assessments are to be used in conjunction with the standard diagnostic evaluation.    A total of 45 minutes were spent in test administration and scoring by this writer, charles Davison.  See Testing Evaluation Report on future date for a full interpretation of the findings and data.     Summary of Services/Procedures  Laura was administered a psychological test battery tailored to her age and the referral questions.     Tests Administered  [place] Assessment of Need - Short Appraisal Schedule (CANSAS)  [place] Symptom Index  Illness Management and Recovery - Self Rating  [place] Symptom Severity Scale  Post Traumatic Stress Disorder Checklist (PCL-5 8 Item)  Audit-C  DAST-10  PROMIS-Sleep Disturbance  International Physical Activity Questionnaire (IPAQ)  Behavioral  Inhibition and Activation Scale- BIS/BAS  Brief [place]-28 Item  Life Event Checklist- LEC Intake / Follow Up  EPINET Core Assessment Battery Intake / Ongoing  Shared Decision Making in Clinical Encounters  Intersectional Discrimination Index  COMPASS 10     Behavioral Observations  Overall, Laura demonstrated good effort throughout testing. Therefore, the current evaluation results are thought to provide a accurate representation of her functioning in the areas assessed.     Plan  The patient and their invited support system will participate in a feedback appointment on a future date with their clinician.     Will coordinate care with other treatment providers to assist with planning as needed.      Mi Day  Psychometrist      Billing for this encounter (CPT 95363, 63804) will occur on a later date when a qualified health professional reviews the findings with the patient in a psychological evaluation appointment (CPT 81158, [id number]).

## 2023-10-03 ENCOUNTER — VIRTUAL VISIT (OUTPATIENT)
Dept: PSYCHIATRY | Facility: CLINIC | Age: 33
End: 2023-10-03
Payer: COMMERCIAL

## 2023-10-03 DIAGNOSIS — F29 PSYCHOSIS, UNSPECIFIED PSYCHOSIS TYPE (H): Primary | ICD-10-CM

## 2023-10-03 NOTE — PROGRESS NOTES
NAVIGJESUS Clinician Contact & Progress Note  For Individual Resiliency Training (IRT)  A Part of the [facility] First Episode of Psychosis Program    NAVIGATE Enrollee: Laura Webb (1990)     MRN: [id number]  Date:  10/03/23  Diagnosis:Psychosis, unspecified  Clinician: MILAN Individual Resiliency Trainer, LORELEI Carrera     1. Type of contact: (majority of time spent)  IRT Session via telehealth  Mode of communication: American Well (HIPAA compliant, secure platform). Patient consented verbally to this mode of therapy today.  Reason for telehealth: COVID-19. This patient visit was converted to a telehealth visit to minimize exposure to COVID-19.    2. People present:   Writer  Client: Yes    3. Length of Actual Contact: Start Time: 2:04; End Time: 2:55     4. Location of contact:  Originating Location (patient location): family home, located in [place], [place]  Distant Location (provider location): Home office, located in [place], [place], using appropriate privacy considerations and procedures    5. Did the client complete the home practice option(s) from the previous session: Completed    6. Motivational Teaching Strategies:  Connect info and skills with personal goals  Promote hope and positive expectations    7. Educational Teaching Strategies:  Review of written material/education  Relate information to client's experience  Ask questions to check comprehension  Break down information into small chunks    8. CBT Teaching Strategies:  Reinforcement and shaping (positive feedback for steps towards goals, gains in knowledge & skills and follow-through on home assignments)    9. IRT Module(s) Addressed:  Module 2 - Assessment/Initial Goal Setting    10. Techniques utilized:   [place] announced at beginning of session  Review of homework  Review of previous meeting  Present new material  Summarize progress made in current session  Other techniques (please specify):   -build rapport by  "discussing preferences, previous experiences of maribell, etc.  -Clarified patient's feelings and perspectives  -Elicited more details about current and recent circumstances  -Emotional support via active and reflective listening, responding to feelings etc.  -Identified patient's strengths/needs  -Identified goals for treatment plan  -Normalized and validated feelings and experiences  -Provided verbal overview of First Episode Program NAVIGATE services  -Provided positive feedback and encouragement  -Provided psychoeducation related to psychosis and related concerns.       11. Measures:    Mental Status Exam  Alertness: alert  and oriented  Behavior/Demeanor: cooperative, pleasant and calm  Speech: normal  Language: intact.   Mood: \"okay\"  Thought Process/Associations: unremarkable  Thought Content:  Reports none;  Denies suicidal ideation and delusions  Perception:  Reports none;  Denies auditory hallucinations and visual hallucinations  Insight: adequate  Judgment: good and adequate for safety  Cognition: does  appear grossly intact; formal cognitive testing was not done    EMEKA-7  Not completed. Pt denied anxiety symptoms when asked.     PHQ-9  Not completed. Pt denied depressive symptoms when asked.     [place] Protocol Risk Identification  Pt denied SI when asked.     12. Assessment/Progress Note:       Writer and client met for IRT visit via [employer]. Set agenda to check in; assess symptoms and coping; review and discuss goal progress; and continue and expand on IRT modules and material. During check in, client reported having had a good week. Reported current symptoms to include: none.  Current stressors include: none. In regards to medications, client reports: no issues and a remission of stiff back as a side effect. . Reports no issues regarding sleep. Substance use: NA. Assessed safety. Client denies SI, denies intent, and denies plan. HI was not present. SIB was not present.  Safety plan has been created in the " past. Safety plan was not reviewed today and includes speaking with , speaking with brother or other family, calling crisis lines, going to the nearest ED if SI/HI/SIB becomes overwhelming, worsens and/or becomes active. Client did not identify urgent concerns to be addressed today.     Writer and client reviewed FBR summary report. Client expressed the much of the report seemed accurate, with the exception of processing speed and problem-solving. Client feels that these two measure did not match her experience in day to day life.     Continued IRT material on assessment. Client appeared engaged in conversation and content. Writer asked client about where she has lived before, about what she watches and listens to for fun, about her friendships outside of family, and about what brings her maribell. Fmaily is most important to her and she notes that it was harder to experience maribell after the episode of psychosis, but also notes that it has been easier to have days without flashbacks of the episode and that maribell is easier to feel again.     Laura did report that she has experienced multiple traumatic experiences in her past, and did not want to discuss in detail.     Writer used relational and interpersonal approach to explore feelings, motivations, and behavior. Writer offered support, feedback, validation, and reinforced use of skills taught in IRT from modalities including cognitive behavioral therapy, psycho education, and skills training. Promoted understanding of their experiences of psychosis and how it impacts them in important areas of their life and in recovery goals. Reflected on client's strengths and resiliency factors and facilitated discussion on how these can assist in symptom management, recovery, and well-being.     Home practice identified as: Work with family clinician to schedule family support visits.     Email sent to Pt with her permission:  Aurelio Sanchez,   I attached the summary we talked through,  "please let me know if you have any further questions.   The links I mentioned are below:    [facility] Public health Coordinator - family health  o Job # [email or web address]  o [facility]  o [email or web address]       [employer]ult [employer] (4 sessions for $30)  o [email or web address]     Warmest regards,   KVNG Carrera, LORELEI     13. Plan/Referrals:     Next visit scheduled for next Tuesday at 2:00.    Billing for \"Interactive Complexity\"?    No      LORELEI Carrera    NAVIGATE Individual Resiliency Trainer  "

## 2023-10-10 ENCOUNTER — VIRTUAL VISIT (OUTPATIENT)
Dept: PSYCHIATRY | Facility: CLINIC | Age: 33
End: 2023-10-10
Payer: COMMERCIAL

## 2023-10-10 DIAGNOSIS — F29 PSYCHOSIS, UNSPECIFIED PSYCHOSIS TYPE (H): Primary | ICD-10-CM

## 2023-10-10 NOTE — PROGRESS NOTES
NAVIGJESUS Clinician Contact & Progress Note  For Individual Resiliency Training (IRT)  A Part of the [facility] First Episode of Psychosis Program    NAVIGATE Enrollee: Laura Webb (1990)     MRN: [id number]  Date:  10/10/23  Diagnosis:Psychosis, unspecified  Clinician: MILAN Individual Resiliency Trainer, LORELEI Carrera     1. Type of contact: (majority of time spent)  IRT Session via telehealth  Mode of communication: American Well (HIPAA compliant, secure platform). Patient consented verbally to this mode of therapy today.  Reason for telehealth: COVID-19. This patient visit was converted to a telehealth visit to minimize exposure to COVID-19.    2. People present:   Writer  Client: Yes    3. Length of Actual Contact: Start Time: 2:06; End Time: 2:55     4. Location of contact:  Originating Location (patient location): family home, located in [place], [place]  Distant Location (provider location): Home office, located in [place], [place], using appropriate privacy considerations and procedures    5. Did the client complete the home practice option(s) from the previous session: Completed    6. Motivational Teaching Strategies:  Connect info and skills with personal goals  Promote hope and positive expectations    7. Educational Teaching Strategies:  Review of written material/education  Relate information to client's experience  Ask questions to check comprehension  Break down information into small chunks    8. CBT Teaching Strategies:  Reinforcement and shaping (positive feedback for steps towards goals, gains in knowledge & skills and follow-through on home assignments)    9. IRT Module(s) Addressed:  Module 2 - Assessment/Initial Goal Setting    10. Techniques utilized:   [place] announced at beginning of session  Review of homework  Review of previous meeting  Present new material  Summarize progress made in current session  Other techniques (please specify):   -build rapport by  "discussing preferences, previous experiences of maribell, etc.  -Clarified patient's feelings and perspectives  -Elicited more details about current and recent circumstances  -Emotional support via active and reflective listening, responding to feelings etc.  -Identified patient's strengths/needs  -Identified goals for treatment plan  -Normalized and validated feelings and experiences  -Provided verbal overview of First Episode Program NAVIGATE services  -Provided positive feedback and encouragement  -Provided psychoeducation related to psychosis and related concerns.       11. Measures:    Mental Status Exam  Alertness: alert  and oriented  Behavior/Demeanor: cooperative, pleasant and calm  Speech: normal  Language: intact.   Mood: \"okay\"  Thought Process/Associations: unremarkable  Thought Content:  Reports none;  Denies suicidal ideation and delusions  Perception:  Reports none;  Denies auditory hallucinations and visual hallucinations  Insight: adequate  Judgment: good and adequate for safety  Cognition: does  appear grossly intact; formal cognitive testing was not done    EMEKA-7  Not completed. Pt denied anxiety symptoms when asked.     PHQ-9  Not completed. Pt denied depressive symptoms when asked.     [place] Protocol Risk Identification  Pt denied SI when asked.     12. Assessment/Progress Note:     Writer and client met for IRT visit via [employer]Only. Set agenda to check in; assess symptoms and coping; review and discuss goal progress; and continue and expand on IRT modules and material. During check in, client reported having had a good week. Reported current symptoms to include: none.  Current stressors include: none. In regards to medications, client reports: no issues and a remission of stiff back as a side effect. Also reported that she will no longer be able to get injections at [facility] and will be switching to [facility]. Reports no issues regarding sleep. Substance use: NA. Assessed safety. Client denies SI, " denies intent, and denies plan. HI was not present. SIB was not present.  Safety plan has been created in the past. Safety plan was not reviewed today and includes speaking with , speaking with brother or other family, calling crisis lines, going to the nearest ED if SI/HI/SIB becomes overwhelming, worsens and/or becomes active. Client did not identify urgent concerns to be addressed today.     Writer and client reviewed FBR summary report. Client expressed the much of the report seemed accurate, with the exception of processing speed and problem-solving. Client feels that these two measure did not match her experience in day to day life.     Continued IRT material on assessment. Client appeared engaged in conversation and content. Writer and client discussed Love of Learning as a strength. Discussed how it has showed up in client' slife previously and how it currently shows up. Client expressed some frustration with wanting to engage in more reading or watching interesting shows, but not feeling able to engage in it like she used to. Writer provided information about avolition and further discussed the idea of behavioral activation. Writer expressed interest in trying behavioral activation and stated she may get a book by another author she has enjoyed in the past and give it a try. Will conitue to discuss strengths at next visit.     Laura did report that she has experienced multiple traumatic experiences in her past, and did not want to discuss in detail.     Writer used relational and interpersonal approach to explore feelings, motivations, and behavior. Writer offered support, feedback, validation, and reinforced use of skills taught in IRT from modalities including cognitive behavioral therapy, psycho education, and skills training. Promoted understanding of their experiences of psychosis and how it impacts them in important areas of their life and in recovery goals. Reflected on client's strengths and  "resiliency factors and facilitated discussion on how these can assist in symptom management, recovery, and well-being.     Home practice identified as: try to approach reading from a behavioral activation lens.    Writer informed Pt that I will be out of the office on Tuesday 10/31.    13. Plan/Referrals:     Next visit scheduled for next Tuesday at 2:00.    Billing for \"Interactive Complexity\"?    No    LORELEI Carrera    NAVIGATE Individual Resiliency Trainer  "

## 2023-10-17 ENCOUNTER — VIRTUAL VISIT (OUTPATIENT)
Dept: PSYCHIATRY | Facility: CLINIC | Age: 33
End: 2023-10-17
Payer: COMMERCIAL

## 2023-10-17 DIAGNOSIS — F29 PSYCHOSIS, UNSPECIFIED PSYCHOSIS TYPE (H): Primary | ICD-10-CM

## 2023-10-17 NOTE — PROGRESS NOTES
NAVIGJESUS Clinician Contact & Progress Note  For Individual Resiliency Training (IRT)  A Part of the [facility] First Episode of Psychosis Program    NAVIGATE Enrollee: Laura Webb (1990)     MRN: [id number]  Date:  10/17/23  Diagnosis:Psychosis, unspecified  Clinician: MILAN Individual Resiliency Trainer, LORELEI Carrera     1. Type of contact: (majority of time spent)  IRT Session via telehealth  Mode of communication: [employer] (HIPAA compliant, secure platform). Patient consented verbally to this mode of therapy today.  Reason for telehealth: COVID-19. This patient visit was converted to a telehealth visit to minimize exposure to COVID-19.    2. People present:   Writer  Client: Yes    3. Length of Actual Contact: Start Time: 2:02; End Time: 2:55     4. Location of contact:  Originating Location (patient location): family home, located in [place], [place]  Distant Location (provider location): Home office, located in [place], [place], using appropriate privacy considerations and procedures    5. Did the client complete the home practice option(s) from the previous session: Completed    6. Motivational Teaching Strategies:  Connect info and skills with personal goals  Promote hope and positive expectations    7. Educational Teaching Strategies:  Review of written material/education  Relate information to client's experience  Ask questions to check comprehension  Break down information into small chunks    8. CBT Teaching Strategies:  Reinforcement and shaping (positive feedback for steps towards goals, gains in knowledge & skills and follow-through on home assignments)    9. IRT Module(s) Addressed:  Module 2 - Assessment/Initial Goal Setting    10. Techniques utilized:   [place] announced at beginning of session  Review of homework  Review of previous meeting  Present new material  Summarize progress made in current session  Other techniques (please specify):   -build rapport by  "discussing preferences, previous experiences of maribell, etc.  -Clarified patient's feelings and perspectives  -Elicited more details about current and recent circumstances  -Emotional support via active and reflective listening, responding to feelings etc.  -Identified patient's strengths/needs  -Identified goals for treatment plan  -Normalized and validated feelings and experiences  -Provided verbal overview of First Episode Program NAVIGATE services  -Provided positive feedback and encouragement  -Provided psychoeducation related to psychosis and related concerns.       11. Measures:    Mental Status Exam  Alertness: alert  and oriented  Behavior/Demeanor: cooperative, pleasant and calm  Speech: normal  Language: intact.   Mood: \"okay\"  Thought Process/Associations: unremarkable  Thought Content:  Reports none;  Denies suicidal ideation and delusions  Perception:  Reports none;  Denies auditory hallucinations and visual hallucinations  Insight: adequate  Judgment: good and adequate for safety  Cognition: does  appear grossly intact; formal cognitive testing was not done    EMEKA-7  Not completed. Pt denied anxiety symptoms when asked.     PHQ-9  Not completed. Pt denied depressive symptoms when asked.     [place] Protocol Risk Identification  Pt denied SI when asked.     12. Assessment/Progress Note:     Writer and client met for IRT visit via [employer]. Set agenda to check in; assess symptoms and coping; review and discuss goal progress; and continue and expand on IRT modules and material. During check in, client reported having had a good week. Reported current symptoms to include: none.  Current stressors include: Pt has worries that her symptoms will return after people ask her how is is doing. Reports that she worries about this maybe once a day on average. Writer and client discussed disclosure conversations and and ways to address this topic when people inquire after her recovery. . In regards to medications, " client reports: no issues and a remission of stiff back as a side effect. Reports no issues regarding sleep. Substance use: NA. Assessed safety. Client denies SI, denies intent, and denies plan. HI was not present. SIB was not present.  Safety plan has been created in the past. Safety plan was not reviewed today and includes speaking with , speaking with brother or other family, calling crisis lines, going to the nearest ED if SI/HI/SIB becomes overwhelming, worsens and/or becomes active. Client did not identify urgent concerns to be addressed today.       Continued IRT material on assessment. Client appeared engaged in conversation and content. Writer and client reviewed Love of Learning as a strength. Discussed how it has showed up in client' slife previously and how it currently shows up. We also went on to discuss Perseverance, Love, and Leadership as strengths and how they show up in client's life. Of note, Leadership is both a strength and a possible source of stress, as it often comes with expectation and obligation. Will conitue to discuss strengths at next visit.     Laura did report that she has experienced multiple traumatic experiences in her past, and did not want to discuss in detail.     Writer used relational and interpersonal approach to explore feelings, motivations, and behavior. Writer offered support, feedback, validation, and reinforced use of skills taught in IRT from modalities including cognitive behavioral therapy, psycho education, and skills training. Promoted understanding of their experiences of psychosis and how it impacts them in important areas of their life and in recovery goals. Reflected on client's strengths and resiliency factors and facilitated discussion on how these can assist in symptom management, recovery, and well-being.     Home practice identified as: try to approach reading from a behavioral activation lens.    Writer reminded Pt that I will be out of the office  "on Tuesday 10/31.    13. Plan/Referrals:     Next visit scheduled for next Tuesday at 2:00.    Billing for \"Interactive Complexity\"?    No    LORELEI Carrera    NAVIGATE Individual Resiliency Trainer  "

## 2023-10-24 ENCOUNTER — VIRTUAL VISIT (OUTPATIENT)
Dept: PSYCHIATRY | Facility: CLINIC | Age: 33
End: 2023-10-24
Payer: COMMERCIAL

## 2023-10-24 DIAGNOSIS — F29 PSYCHOSIS, UNSPECIFIED PSYCHOSIS TYPE (H): Primary | ICD-10-CM

## 2023-10-24 NOTE — PROGRESS NOTES
NAVIGJESUS Clinician Contact & Progress Note  For Individual Resiliency Training (IRT)  A Part of the [facility] First Episode of Psychosis Program    NAVIGATE Enrollee: Laura Webb (1990)     MRN: [id number]  Date:  10/24/23  Diagnosis:Psychosis, unspecified  Clinician: MILAN Individual Resiliency Trainer, LORELEI Carrera     1. Type of contact: (majority of time spent)  IRT Session via telehealth  Mode of communication: [employer] (HIPAA compliant, secure platform). Patient consented verbally to this mode of therapy today.  Reason for telehealth: COVID-19. This patient visit was converted to a telehealth visit to minimize exposure to COVID-19.    2. People present:   Writer  Client: Yes    3. Length of Actual Contact: Start Time: 2:12; End Time: 2:58     4. Location of contact:  Originating Location (patient location): family home, located in [place], [place]  Distant Location (provider location): Home office, located in [place], [place], using appropriate privacy considerations and procedures    5. Did the client complete the home practice option(s) from the previous session: Completed    6. Motivational Teaching Strategies:  Connect info and skills with personal goals  Promote hope and positive expectations    7. Educational Teaching Strategies:  Review of written material/education  Relate information to client's experience  Ask questions to check comprehension  Break down information into small chunks    8. CBT Teaching Strategies:  Reinforcement and shaping (positive feedback for steps towards goals, gains in knowledge & skills and follow-through on home assignments)    9. IRT Module(s) Addressed:  Module 2 - Assessment/Initial Goal Setting    10. Techniques utilized:   [place] announced at beginning of session  Review of homework  Review of previous meeting  Present new material  Summarize progress made in current session  Other techniques (please specify):   -build rapport by  "discussing preferences, previous experiences of maribell, etc.  -Clarified patient's feelings and perspectives  -Elicited more details about current and recent circumstances  -Emotional support via active and reflective listening, responding to feelings etc.  -Identified patient's strengths/needs  -Identified goals for treatment plan  -Normalized and validated feelings and experiences  -Provided verbal overview of First Episode Program NAVIGATE services  -Provided positive feedback and encouragement  -Provided psychoeducation related to psychosis and related concerns.       11. Measures:    Mental Status Exam  Alertness: alert  and oriented  Behavior/Demeanor: cooperative, pleasant and calm  Speech: normal  Language: intact.   Mood: \"okay\"  Thought Process/Associations: unremarkable  Thought Content:  Reports none;  Denies suicidal ideation and delusions  Perception:  Reports none;  Denies auditory hallucinations and visual hallucinations  Insight: adequate  Judgment: good and adequate for safety  Cognition: does  appear grossly intact; formal cognitive testing was not done    EMEKA-7  Not completed. Pt denied anxiety symptoms when asked.     PHQ-9  Not completed. Pt denied depressive symptoms when asked.     [place] Protocol Risk Identification  Pt denied SI when asked.     12. Assessment/Progress Note:     Writer and client met for IRT visit via zoom. Set agenda to check in; assess symptoms and coping; review and discuss goal progress; and continue and expand on IRT modules and material. During check in, client reported having had a good week. Reported current symptoms to include: none.  Current stressors include: Pt has worries that her symptoms will return after people ask her how is is doing. Reports that she worries about this maybe once a day on average. Client discussed a potentially stressful event related to the car breaking. Client noted that she was able to find gratitude in the face of this challenge. Writer " and client discussed gratitude practices as a method of coping with stress. In regards to medications, client reports: no issues and a remission of stiff back as a side effect. Reports no issues regarding sleep. Substance use: NA. Assessed safety. Client denies SI, denies intent, and denies plan. HI was not present. SIB was not present.  Safety plan has been created in the past. Safety plan was not reviewed today and includes speaking with , speaking with brother or other family, calling crisis lines, going to the nearest ED if SI/HI/SIB becomes overwhelming, worsens and/or becomes active. Client did not identify urgent concerns to be addressed today.     Continued IRT material on assessment. Client appeared engaged in conversation and content. Writer and client reviewed Love of Learning as a strength. Discussed how it has showed up in client' slife previously and how it currently shows up. We also went on to discuss Perseverance, Love, and Leadership as strengths and how they show up in client's life. Of note, Leadership is both a strength and a possible source of stress, as it often comes with expectation and obligation. Primarily discussed spirituality and sense of purpose as a strength today, noting how foundational it is for client. Additionally began to discuss how the episode impacted client.     Client noted a topic of discussion for next visit, which will take place on 11/2, as this writer is out of the office on 10/31.    Laura has reported that she has experienced multiple traumatic experiences in her past, and did not want to discuss in detail.     Writer used relational and interpersonal approach to explore feelings, motivations, and behavior. Writer offered support, feedback, validation, and reinforced use of skills taught in IRT from modalities including cognitive behavioral therapy, psycho education, and skills training. Promoted understanding of their experiences of psychosis and how it  "impacts them in important areas of their life and in recovery goals. Reflected on client's strengths and resiliency factors and facilitated discussion on how these can assist in symptom management, recovery, and well-being.     Home practice identified as: try to approach reading from a behavioral activation lens.    Writer reminded Pt that I will be out of the office on Tuesday 10/31.    13. Plan/Referrals:     Next visit scheduled for next Thursday at 2:00.    Billing for \"Interactive Complexity\"?    No    LORELEI Carrera    NAVIGATE Individual Resiliency Trainer  "

## 2023-10-27 ENCOUNTER — VIRTUAL VISIT (OUTPATIENT)
Dept: PSYCHIATRY | Facility: CLINIC | Age: 33
End: 2023-10-27
Payer: COMMERCIAL

## 2023-10-27 VITALS — BODY MASS INDEX: 24.8 KG/M2 | WEIGHT: 140 LBS | HEIGHT: 63 IN

## 2023-10-27 DIAGNOSIS — F29 PSYCHOSIS, UNSPECIFIED PSYCHOSIS TYPE (H): ICD-10-CM

## 2023-10-27 RX ORDER — PALIPERIDONE PALMITATE 156 MG/ML
156 INJECTION INTRAMUSCULAR
Qty: 1 ML | Refills: 11 | Status: SHIPPED | OUTPATIENT
Start: 2023-10-27 | End: 2023-10-27

## 2023-10-27 RX ORDER — BENZTROPINE MESYLATE 0.5 MG/1
0.5 TABLET ORAL 2 TIMES DAILY
Qty: 60 TABLET | Refills: 2 | Status: SHIPPED | OUTPATIENT
Start: 2023-10-27 | End: 2023-12-22

## 2023-10-27 ASSESSMENT — PAIN SCALES - GENERAL: PAINLEVEL: NO PAIN (0)

## 2023-10-27 NOTE — NURSING NOTE
Is the patient currently in the state of [place]? YES    Visit mode:VIDEO    If the visit is dropped, the patient can be reconnected by: VIDEO VISIT: Send to e-mail at: [email or web address]    Will anyone else be joining the visit? NO  (If patient encounters technical issues they should call [phone]    How would you like to obtain your AVS? MyChart    Are changes needed to the allergy or medication list? No    Reason for visit: Video Visit (Follow Up )      NAVIGATE Patient Self-Rating Form    Since your last medication management visit--    Have you been feeling depressed, sad, or down? No  Have you been feeling anxious, worried or nervous? No  Have you been thinking about death or have you had any feelings that you would be better off dead? No   Have you been feeling particularly good? OK   Have you been feeling annoyed, angry, or resentful (whether you showed it or not)? NO  Did you do anything that could have gotten you in trouble? No  Have you felt dizzy or faint? No  Have you had blurred vision? No  Have you had dry mouth? No  Have you had too much saliva in your mouth or had drooling? No  Have you felt nauseous? No  Have you been constipated? No  Has you appetite for food been increased? No  Have you gained weight? No  Have you lost weight? No  Have you felt restless or like you cannot sit still? No  Any shaking of your hands, legs, or other muscles? No  Any problems walking or moving or any problems feeling stiff or rigid? No  Have you felt tired or fatigued? No  Have you felt drowsy during the day? No  Have you been sleeping too much at night? No  Have you been sleeping too little or had problems sleeping at night? No  Any decrease in your interest in sex? No  Any other problems with sex? No  Any problems with your breasts such as swelling or discharge? No  For women, any problems with your period? No  Are there other medical or side effect problems you wish to discuss with your prescriber?  No  Since your last visit, how many days have you not taken your medication? No   Have you had trouble remembering to take your medication? No  Do you find the number of medicines or the times when you are supposed to take then confusing or burdensome? No  Are you afraid of the medication? No  Do you think that you have an illness that requires taking medication? No  Do you think that other people would think poorly of you if they knew that you take medication? yes  On average, how many cigarettes do you smoke per day? 0  Since you last visit, did you drink alcohol? No  Since your last visit, have you used any marijuana? No  Since your last visit, have you used any stress drugs other than marijuana? No  Between now and your next visit, do you think we should keep your medication the same or consider changing the medications? Same         Beronica Pozo

## 2023-10-27 NOTE — PROGRESS NOTES
"Virtual Visit Details    Type of service:  Video Visit   Video Start Time: {video visit start/end time for provider to select:325337}  Video End Time:{video visit start/end time for provider to select:856245}    Originating Location (pt. Location): {video visit patient location:163840::\"Home\"}  {PROVIDER LOCATION On-site should be selected for visits conducted from your clinic location or adjoining [facility] hospital, academic office, or other nearby [facility] building. Off-site should be selected for all other provider locations, including home:494338}  Distant Location (provider location):  {virtual location provider:911462}  Platform used for Video Visit: {Virtual Visit Platforms:465462::\"[employer]s\"}  "

## 2023-10-27 NOTE — PROGRESS NOTES
"Virtual Visit Details    Originating Location (pt. Location): Home    Distant Location (provider location):  On-site  Platform used for Video Visit: PushCoin    [facility] Medication Management Progress Note  A Part of the [facility] First Episode of Psychosis Program    NAVIGATE Enrollee: Laura Webb (1990)     MRN: [id number]  Date:  10/27/23         Contributors to the Assessment     Chart Reviewed.   Interview completed with Laura Webb.  Collateral information obtained from Laura.         Chief Complaint      \"Okay\"         Interim History      Laura Webb is a 33 year old female who was last seen in MD clinic on  at which time no medication changes were made. The patient reports good treatment adherence.  History was provided by Luara who was a fair historian.  Since the last visit:  -She has been doing well overall, no recent changes in sleep or appetite.   -some stressors recently but feels she is handling them well  -had discussed back stiffness at previous appointments, reports this has improved  -no anxiety/depression, denies psychosis symptoms.    PSYCH ROS:  Clinician Rating Form in COMPASS  1. Depressed Mood: Ratin  0 Not reported     1 Very mild occasionally feels sad or  down ; of questionable clinical significance   2 Mild occasionally feels moderately depressed or often feels sad or  down    3 Moderate occasionally feels very depressed or often feels moderately depressed   4 Moderately severe often feels very depressed   5 Severe feels very depressed most of the time   6 Very severe constant extremely painful feelings of depression   U Unable to assess        2. Anxiety/Worry: Ratin  0 Not reported     1 Very mild occasionally feels a little anxious; of questionable clinical significance   2 Mild occasionally feels moderately anxious or often feels a little anxious or worried   3 Moderate occasionally feels very anxious or often feels moderately anxious   4 Moderately severe " often feels very anxious or often feels moderately anxious   5 Severe feels very anxious or worried most of the time   6 Very severe patient is continually preoccupied with severe anxiety   U Unable to assess        3. Suicidal Ideation/Behavior: Ratin          0 Not reported     1 Very mild occasional thoughts of dying,  I d be better off dead  or  I wish I were dead    2 Mild frequents thoughts of dying or occasional thoughts of killing self, without plan or method   3 Moderate often thinks of suicide or has though of a specific method   4 Moderately severe has mentally rehearsed a specific method of suicide or has made a suicide attempt with questionable intent to die (e.r. takes aspirins and then tells family)   5 Severe has made preparations for a potentially lethal suicide attempt (e.g acquires a gun and bullets for an attempt)   6 Very severe has made a suicide attempt with an intent to die   U Unable to assess                      4. Elevated/Expansive Mood: Ratin  0 Not at all     1 Very mild questionable; more cheerful than most people in his/her circumstances but of only possible clinical significance   2 Mild brief elevated/expansive mood but only somewhat out of proportion to the circumstances   3 Moderate brief/occasional elevation of mood which is clearly out of proportion to the circumstances   4 Moderately severe sustained/frequent elevation of mood which is clearly out of proportion to the circumstances   5 Severe mood is euphoric most of the time   6 Very severe sustained elevation;  everything is wonderful  almost all of the time   U Unable to assess        5. Hostility/Anger/Irritability/Aggressiveness: Ratin  0 Not at all     1 Very mild occasional irritability of doubtful clinical significance   2 Mild occasionally feels angry or mild or indirect expressions of anger, e.g. sarcasm, disrespect or hostile gestures   3 Moderate frequently feels angry, frequent irritability or  occasional direct expression of anger, e.g. yelling at others   4 Moderately severe often feels very angry, often yells at others or occasionally threatens to harm others   5 Severe has acted on his anger by becoming physically abusive on one or two occasions or makes frequent threats to harm others or is very angry most of the time   6 Very severe has been physically aggressive and/or required intervention to prevent assaultiveness on several occasions; or any serious assaultive act   U Unable to assess        6. Impulsive Behavior: Ratin  0 Not at all     1 Very mild one instance of impulsive behavior which is of doubtful clinical significance   2 Mild occasional impulsive acts, e.g. making phone calls at odd hours   3 Moderate occasional impulsive acts with some potential negative consequence, e.g. leaving work abruptly; changing plans without thinking   4 Moderately severe impulsive acts with definite negative consequences, e.g. overspending on non-essentials; repeated reckless sexual behavior   5 Severe impulsive acts with direct negative consequences, e.g. spends entire income on nonessentials without regard for basic needs   6 Very severe impulsive behavior which is potentially life threatening, e.g. jumps from dangerous height (without suicidal intent) or criminal behavior, e.g. impulsive robbery   U Unable to assess        7. Suspiciousness: Ratin  0 Not present     1 Very mild Seems on guard. Reluctant to respond to some  personal  questions. Reports being overly self-conscious in public   2 Mild Describes incidents in which others have harmed or wanted to harm him/her that sound plausible. Patient feels as if others are watching, laughing, or criticizing him/her in public, but this occurs only occasionally or rarely. Little or no preoccupation   3 Moderate Says others are talking about him/her maliciously, have negative intentions, or may harm him/her. Beyond the likelihood of plausibility, but  not delusional. Incidents of suspected persecution occur occasionally (less than once per week) with some preoccupation   4 Moderately severe Same as 3, but incidents occur frequently such as more than once a week. Patient is moderately preoccupied with ideas of persecution OR patient reports persecutory delusions expressed with much doubt (e.g. partial delusion)   5 Severe Delusional -- speaks of Mafia plots, the FBI, or others poisoning his/her food, persecution by supernatural forces   6 Extremely severe Same as 5, but the beliefs are bizarre or more preoccupying. Patient tends to disclose or act on persecutory delusions.   U Unable to assess        8. Unusual Thought Content: Ratin  0 Not present     1 Very mild Ideas of reference (people may stare or may laugh at him), ideas of persecution (people may mistreat him). Unusual beliefs in psychic green, spirits, UFOs, or unrealistic beliefs in one's own abilities. Not strongly held. Some doubt   2 Mild Same as 1, but degree of reality distortion is more severe as indicated by highly unusual ideas or greater conviction. Content may be typical of delusions (even bizarre), but without full conviction. The delusion does not seem to have fully formed, but is considered as one possible explanation for an unusual experience   3 Moderate Delusion present but no preoccupation or functional impairment. May be an encapsulated delusion or a firmly endorsed absurd belief about past delusional circumstances   4 Moderately severe Full delusion(s) present with some preoccupation OR some areas of functioning disrupted by delusional thinking   5 Severe Full delusion(s) present with much preoccupation OR many areas of functioning are disrupted by delusional thinking   6 Extremely severe Full delusions present with almost   U Unable to assess        9. Hallucinations: Ratin  0 Not present     1 Very mild While resting or going to sleep, sees visions, smells odors, or hears  voices, sounds or whispers in the absence of external stimulation, but no impairment in functioning   2 Mild While in a clear state of consciousness, hears a voice calling the subject s name, experiences non-verbal auditory hallucinations (e.g., sounds or whispers), formless visual hallucinations, or has sensory experiences in the presence of a modality-relevant stimulus (e.g., visual illusions) infrequently (e.g., 1-2 times per week) and with no functional impairment   3 Moderate Occasional verbal, visual, gustatory, olfactory, or tactile hallucinations with no functional impairment OR non-verbal auditory hallucinations/visual illusions more than infrequently or with impairment   4 Moderately severe Experiences daily hallucinations OR some areas of functioning are disrupted by hallucinations   5 Severe Experiences verbal or visual hallucinations several times a day OR many areas of functioning are disrupted by these hallucinations   6 Extremely severe Persistent verbal or visual hallucinations throughout the day OR most areas of functioning are disrupted by these hallucinations   U Unable to assess        10. Conceptual Disorganization: Ratin  0 Not present     1 Very mild Peculiar use of words or rambling but speech is comprehensible   2 Mild Speech a bit hard to understand or make sense of due to tangentiality, circumstantiality, or sudden topic shifts   3 Moderate Speech difficult to understand due to tangentiality, circumstantiality, idiosyncratic speech, or topic shifts on many occasions OR 1-2 instances of incoherent phrases   4 Moderately severe Speech difficult to understand due to circumstantiality, tangentiality, neologisms, blocking, or topic shifts most of the time OR 3-5 instances of incoherent phrases   5 Severe Speech is incomprehensible due to severe impairments most of the time. Many PSRS items cannot be rated by self-report alone   6 Extremely severe Speech is incomprehensible throughout  interview   U Unable to assess        11. Avolition/Apathy: Ratin  0 Not at all     1 Very mild questionable decrease in time spent in goal-directed activities   2 Mild spends less time in goal-directed activities than is appropriate for situation and age   3 Moderate initiates activities at times but does not follow through   4 Moderately severe rarely initiates activity but will passively engage with encouragement   5 Severe almost never initiates activities; requires assistance to accomplish basic activities   6 Very severe does not initiate or persist in any goal-directed activity even with outside assistance   U Unable to assess        12. Asociality/Low Social Drive: Ratin  0 Not at all     1 Very mild questionable   2 Mild slow to initiate social interactions but usually responds to overtures by others   3 Moderate rarely initiates social interactions; sometimes responds to overtures by others   4 Moderately severe does not initiate but sometimes responds to overtures by others; little social interaction outside close family members   5 Severe never initiates and rarely encourages conversations or activities; avoids being with others unless prodded, may have contacts with family   6 Very severe avoids being with others (even family members) whenever possible, extreme social isolation   U Unable to assess        13. Adherence: Days: 0  The longest, continuous amount of time in days, since the last visit when the subject did not take medication      14. EPS Part I: Ratin  Rate Elbow Rigidity for all subjects  0 Normal   1 Slight stiffness and resistance   2 Moderate stiffness and resistance   3 Marked rigidity with difficulty in passive movement   4 Extreme stiffness and rigidity with almost a frozen joint   U Unable to assess            EPS Part 2: Signs of EPS: 0  Are there are other signs of EPS (eg diminished arm swing, postural instability, cogwheeling, tremor, akinesia) present based upon  patient report or exam?  0 No   1 Yes      15. Akathesia: Ratin  0 No restlessness reported or observed   1 Mild restlessness observed; e.g., occasional jiggling of the foot occurs when subject is seated   2 Moderate restlessness observed; e.g., on several occasions, jiggles foot, crosses and uncrosses legs or twists a part of the body   3 Restlessness is frequently observed; e.g., the foot or legs moving most of the time   4 Restlessness persistently observed; e.g., subject cannot sit still, may get up and walk   U Unable to assess      16. Dyskinetic Movement Ratings: Ratin  0 None   1 Minimal, may be extreme normal   2 Mild   3 Moderate   4 Severe   U Unable to assess      SIDE EFFECT ASSESSMENT:  Clinician Rating Form in [facility] - Side Effect Assessment  Address side effects reported by the patient and rate using this scale  0 Not present   1 Minimal, may be extreme normal   2 Mild   3 Moderate   4 Severe   U Unable to assess   P Present, but not related      Feeling dizzy or faint: 0  Blurred vision: 0  Dry mouth: 0  Too much saliva/droolin  Nausea:  0  Constipation: 0  Increased appetite: 0  Weight gain: 0  Weight loss: 0  Feeling tired/fatigue: 0  Daytime sedation: 0  Hypersomnia: 0  Insomnia: 0  low libido: 0  Other problems with sex: 0  Breast enlargement or discharge:  0  Irregular Menstruation or amenorrhea: 0  Other (please list and rate): 0     RECENT SUBSTANCE USE:  Clinician Rating Form in [facility] - Substance Use Assessment  Alcohol Use Severity: Ratin  0 none   1 use without impairment: drinks but no immediate social or medical impairment   2 use with impairment: e.g. becomes grossly intoxicated; alcohol use or withdrawal compromises school, work or social functioning; alcohol use or withdrawal exacerbates symptoms (e.g. gets depressed when drinking)      Marijuana Use Severity: Ratin  0 none   1 occasional use without impairment: e.g. uses marijuana a few days a month and has no  "immediate social or medical impairment   2 frequent use without impairment: e.g. uses marijuana several or more days a week but has no immediate social or medical impairment   3 use with impairment: e.g. becomes grossly intoxicated; marijuana use compromises school, work or social functioning; marijuana use exacerbates symptoms (e.g. gets paranoid when using)      Other Drug Use Severity: Ratin  0 none   1 occasional use without impairment: e.g. uses drug(s) a few days a month and has no immediate social or medical impairment   2 frequent use without impairment: e.g. uses drug(s) several or more days a week but has no immediate social or medical impairment   3 use with impairment: e.g. becomes grossly intoxicated; drug use compromises school, work or social functioning; drug use exacerbates symptoms (e.g. gets paranoid when using)      RECENT SOCIAL HISTORY:  SEE HPI    Medical ROS:  none         First Episode of Psychosis History      DUP (duration untreated psychosis):  several days to eight months  Route to initial care: hospitalization  Medication adherence overall:  See above, Clinician Rating Form in COMPASS Item 13  General frequency of visits:  monthly  Participation in groups:  No    Reviewed for completion of First Episode work-up:  Yes  First episode workup:  Not Done (if completed, see LABS for results)  MATRICS Consensus Cognitive Battery:  Not Done (if completed, see LABS for results)         Medical/Surgical History     Blood-group specific substance    There is no problem list on file for this patient.           Medications     Current Outpatient Medications   Medication Sig Dispense Refill    benztropine (COGENTIN) 0.5 MG tablet Take 1 tablet (0.5 mg) by mouth 2 times daily 60 tablet 2    paliperidone (INVEGA SUSTENNA) 156 MG/ML TERRELL injection Inject 1 mL (156 mg) into the muscle every 28 days 1 mL 3             Vitals     Ht 1.6 m (5' 3\")   Wt 63.5 kg (140 lb)   BMI 24.80 kg/m            " Mental Status Exam     Alertness: alert  and oriented  Appearance: well groomed  Behavior/Demeanor: pleasant and calm, with fair  eye contact   Speech: normal  Language: no obvious problem  Psychomotor: normal or unremarkable  Mood:  OK  Affect: blunted; was congruent to mood; was congruent to content  Thought Process/Associations: unremarkable  Thought Content:  Reports none;  Denies suicidal and violent ideation and delusions  Perception:  Reports none;  Denies auditory hallucinations and visual hallucinations  Insight:  difficult to assess as her memory of certain events is limited  Judgment: good, adequate for safety  Cognition: does  appear grossly intact; formal cognitive testing was not done         Labs and Data     RATING SCALES: AIMS due    PHQ9 TODAY =       8/4/2023    11:00 AM 9/22/2023    11:15 AM   PHQ-9 SCORE   PHQ-9 Total Score 0 1       ANTIPSYCHOTIC LABS ROUTINE    [glu, A1C, lipids (focus LDL), liver enzymes, WBC, ANEU, Hgb, plts]   q12 mo  No lab results found.  No lab results found.  No lab results found.  No lab results found.    Narrative & Impression   EXAM: MR BRAIN W/O and W CONTRAST  LOCATION: [facility]  DATE: 8/22/2023     INDICATION:  Psychosis, unspecified psychosis type (H)  COMPARISON: None.  CONTRAST: 7ml Gadavist  TECHNIQUE: Routine multiplanar multisequence head MRI without and with intravenous contrast.     FINDINGS:  INTRACRANIAL CONTENTS: No acute or subacute infarct. No mass, acute hemorrhage, or extra-axial fluid collections. Normal brain parenchymal signal. Normal ventricles and sulci. Normal position of the cerebellar tonsils. No pathologic contrast enhancement.   Posterior fossa structures including cerebellar hemispheres, fourth ventricle and CP angle cisterns are clear. Nasopharynx is clear. Region of the sella and suprasellar cistern are clear.     SELLA: No abnormality accounting for technique.     OSSEOUS STRUCTURES/SOFT TISSUES: Normal  marrow signal. The major intracranial vascular flow voids are maintained.      ORBITS: No abnormality accounting for technique.      SINUSES/MASTOIDS: No paranasal sinus mucosal disease. No middle ear or mastoid effusion.                                                                    IMPRESSION:  1.  Normal head MRI.            Psychiatric Diagnoses     Schizophreniform disorder, with positive prognostic features  H/o catatonia         Assessment     From intake, 8/4/23: Laura Webb is a 32 year old  Black or  Not  or  female who presented for a comprehensive assessment of psychiatric symptoms by the First Episode of Psychosis Navigate Program. Diagnostically challenging given single-episode of mental illness. Differential is broad including psychosis due to medical condition (seizure), bipolar disorder, MDD single episode with psychotic features. Substances do not appear to be a contributing factor. Schizophrenia also considered, though unlikely given rapid-onset of her symptoms and high cognitive and psychosocial functioning following her first episode / hospitalization. Her chart review and interview today present as most consistent with schizophreniform disorder. Prognosis is fair-to-good. Rapid onset of symptoms is positive predictive factor for resolution of symptoms / return to baseline.   We discussed this assessment with her today. We discussed our recommendations: to continue current medications, complete her medical / neurological workup to rule-out underlying epileptic and autoimmune etiologies. Recommend continuing long-acting-injectable for now, ideally for 1 year following resolution of her psychotic symptoms. She expressed understanding of this plan.      TODAY Laura reports she has been doing well recently. Denies any new mental health symptoms or side effects. Feels she is coping well with stressors. No med changes indicated today. Continue to recommend  FEP labs but she has not gotten these yet.    PSYCHOTROPIC DRUG INTERACTIONS:   None.  MANAGEMENT:  N/A         Plan   1) Medications  - continue Invega Sustenna 156 mg IM q28 days (gets this at [facility])  - continue benztropine 0.5 mg BID      2) Therapy-  weekly IRT      3) Next Due   Labs- remaining first episode labs ordered 08/10/23   EKG- last completed 4/20/23, repeat if increasing or adding Qtc- prolonging medications   Rating scales- AIMS: annually     4) Referrals  Medical concerns- seizure history, neurology referral for EEG; she expresses hesitance about this       5) RTC: q 2 weeks     6) Crisis Numbers:   Provided routinely in AVS     After hours:  [phone]    TREATMENT RISK STATEMENT:  The risks, benefits, alternatives and potential adverse effects have been discussed and are understood by the pt. The pt understands the risks of using street drugs or alcohol. There are no medical contraindications, the pt agrees to treatment with the ability to do so. The pt knows to call the clinic for any problems or to access emergency care if needed.  Medical and substance use concerns are documented above.  Psychotropic drug interaction check was done, including changes made today.      PROVIDER:  Yaneth Guzmán MD

## 2023-11-02 ENCOUNTER — VIRTUAL VISIT (OUTPATIENT)
Dept: PSYCHIATRY | Facility: CLINIC | Age: 33
End: 2023-11-02
Payer: COMMERCIAL

## 2023-11-02 DIAGNOSIS — F29 PSYCHOSIS, UNSPECIFIED PSYCHOSIS TYPE (H): Primary | ICD-10-CM

## 2023-11-02 NOTE — PROGRESS NOTES
NAVIGJSEUS Clinician Contact & Progress Note  For Individual Resiliency Training (IRT)  A Part of the [facility] First Episode of Psychosis Program    NAVIGATE Enrollee: Laura Webb (1990)     MRN: [id number]  Date:  11/02/23  Diagnosis:Psychosis, unspecified  Clinician: MILAN Individual Resiliency Trainer, LORELEI Carrera     1. Type of contact: (majority of time spent)  IRT Session via telehealth  Mode of communication: [employer] (HIPAA compliant, secure platform). Patient consented verbally to this mode of therapy today.  Reason for telehealth: COVID-19. This patient visit was converted to a telehealth visit to minimize exposure to COVID-19.    2. People present:   Writer  Client: Yes    3. Length of Actual Contact: Start Time: 2:12; End Time: 2:58     4. Location of contact:  Originating Location (patient location): family home, located in [place], [place]  Distant Location (provider location): Home office, located in [place], [place], using appropriate privacy considerations and procedures    5. Did the client complete the home practice option(s) from the previous session: Completed    6. Motivational Teaching Strategies:  Connect info and skills with personal goals  Promote hope and positive expectations    7. Educational Teaching Strategies:  Review of written material/education  Relate information to client's experience  Ask questions to check comprehension  Break down information into small chunks    8. CBT Teaching Strategies:  Reinforcement and shaping (positive feedback for steps towards goals, gains in knowledge & skills and follow-through on home assignments)    9. IRT Module(s) Addressed:  Module 2 - Assessment/Initial Goal Setting    10. Techniques utilized:   [place] announced at beginning of session  Review of homework  Review of previous meeting  Present new material  Summarize progress made in current session  Other techniques (please specify):   -build rapport by  "discussing preferences, previous experiences of maribell, etc.  -Clarified patient's feelings and perspectives  -Elicited more details about current and recent circumstances  -Emotional support via active and reflective listening, responding to feelings etc.  -Identified patient's strengths/needs  -Identified goals for treatment plan  -Normalized and validated feelings and experiences  -Provided verbal overview of First Episode Program NAVIGATE services  -Provided positive feedback and encouragement  -Provided psychoeducation related to psychosis and related concerns.       11. Measures:    Mental Status Exam  Alertness: alert  and oriented  Behavior/Demeanor: cooperative, pleasant and calm  Speech: normal  Language: intact.   Mood: \"good\"  Thought Process/Associations: unremarkable  Thought Content:  Reports none;  Denies suicidal ideation and delusions  Perception:  Reports none;  Denies auditory hallucinations and visual hallucinations  Insight: adequate  Judgment: good and adequate for safety  Cognition: does  appear grossly intact; formal cognitive testing was not done    EMEKA-7  Not completed. Pt denied anxiety symptoms when asked.     PHQ-9  Not completed. Pt denied depressive symptoms when asked.     [place] Protocol Risk Identification  Pt denied SI when asked.     12. Assessment/Progress Note:     Writer and client met for IRT visit via zoom. Set agenda to check in; continue to expand on IRT modules and material. During check in, client reported having had a good week. Reported current symptoms to include: none.  Current stressors include: Pt has worries that her symptoms will return after people ask her how is is doing. Reports that she worries about this maybe once a day on average.  In regards to medications, client reports: no issues and a remission of stiff back as a side effect. Reports no issues regarding sleep. Substance use: NA. Assessed safety. Client denies SI, denies intent, and denies plan. HI was " "not present. SIB was not present.  Safety plan has been created in the past. Safety plan was not reviewed today and includes speaking with , speaking with brother or other family, calling crisis lines, going to the nearest ED if SI/HI/SIB becomes overwhelming, worsens and/or becomes active. Client did identify concern to be addressed today - began to discuss topic of relationships with family and boundary setting.     Began to discuss relationships and stress managment. Client appeared engaged in conversation and content. Spent some time discussing relationships with client's mother and client's in-laws, as well as how these family members support client during the episode. Began to discuss stress and the impact of stress on relapse of symptoms, the stress-vulnerability model. Began to discuss expectations within the family system and how they are communicated. Plan to start review of personal values at next visit.      Laura has reported that she has experienced multiple traumatic experiences in her past, and did not want to discuss in detail.     Writer used relational and interpersonal approach to explore feelings, motivations, and behavior. Writer offered support, feedback, validation, and reinforced use of skills taught in IRT from modalities including cognitive behavioral therapy, psycho education, and skills training. Promoted understanding of their experiences of psychosis and how it impacts them in important areas of their life and in recovery goals. Reflected on client's strengths and resiliency factors and facilitated discussion on how these can assist in symptom management, recovery, and well-being.     Home practice identified as: think about personal values        13. Plan/Referrals:     Next visit scheduled for next Tuesday at 2:00.    Billing for \"Interactive Complexity\"?    No    LORELEI Carrera Individual Resiliency Trainer  "

## 2023-11-07 ENCOUNTER — VIRTUAL VISIT (OUTPATIENT)
Dept: PSYCHIATRY | Facility: CLINIC | Age: 33
End: 2023-11-07
Payer: COMMERCIAL

## 2023-11-07 DIAGNOSIS — F29 PSYCHOSIS, UNSPECIFIED PSYCHOSIS TYPE (H): Primary | ICD-10-CM

## 2023-11-07 NOTE — PROGRESS NOTES
NAVIGJESUS Clinician Contact & Progress Note  For Individual Resiliency Training (IRT)  A Part of the [facility] First Episode of Psychosis Program    NAVIGATE Enrollee: Laura Webb (1990)     MRN: [id number]  Date:  11/07/23  Diagnosis:Psychosis, unspecified  Clinician: MILAN Individual Resiliency Trainer, LORELEI Carrera     1. Type of contact: (majority of time spent)  IRT Session via telehealth  Mode of communication: [employer] Well (HIPAA compliant, secure platform). Patient consented verbally to this mode of therapy today.  Reason for telehealth: COVID-19. This patient visit was converted to a telehealth visit to minimize exposure to COVID-19.    2. People present:   Writer  Client: Yes    3. Length of Actual Contact: Start Time: 2:07; End Time: 2:57     4. Location of contact:  Originating Location (patient location): family home, located in [place], [place]  Distant Location (provider location): Home office, located in [place], [place], using appropriate privacy considerations and procedures    5. Did the client complete the home practice option(s) from the previous session: Completed    6. Motivational Teaching Strategies:  Connect info and skills with personal goals  Promote hope and positive expectations    7. Educational Teaching Strategies:  Review of written material/education  Relate information to client's experience  Ask questions to check comprehension  Break down information into small chunks    8. CBT Teaching Strategies:  Reinforcement and shaping (positive feedback for steps towards goals, gains in knowledge & skills and follow-through on home assignments)    9. IRT Module(s) Addressed:  Module 2 - Assessment/Initial Goal Setting    10. Techniques utilized:   [place] announced at beginning of session  Review of homework  Review of previous meeting  Present new material  Summarize progress made in current session  Other techniques (please specify):   -build rapport by  "discussing preferences, previous experiences of maribell, etc.  -Clarified patient's feelings and perspectives  -Elicited more details about current and recent circumstances  -Emotional support via active and reflective listening, responding to feelings etc.  -Identified patient's strengths/needs  -Identified goals for treatment plan  -Normalized and validated feelings and experiences  -Provided verbal overview of First Episode Program NAVIGATE services  -Provided positive feedback and encouragement  -Provided psychoeducation related to psychosis and related concerns.       11. Measures:    Mental Status Exam  Alertness: alert  and oriented  Behavior/Demeanor: cooperative, pleasant and calm  Speech: normal  Language: intact.   Mood: \"good\"  Thought Process/Associations: unremarkable  Thought Content:  Reports none;  Denies suicidal ideation and delusions  Perception:  Reports none;  Denies auditory hallucinations and visual hallucinations  Insight: adequate  Judgment: good and adequate for safety  Cognition: does  appear grossly intact; formal cognitive testing was not done    EMEKA-7  Not completed. Pt denied anxiety symptoms when asked.     PHQ-9  Not completed. Pt denied depressive symptoms when asked.     [place] Protocol Risk Identification  Pt denied SI when asked.     12. Assessment/Progress Note:     Writer and client met for IRT visit via zoom. Set agenda to check in; continue to expand on IRT modules and material. During check in, client reported having had a good week. Reported current symptoms to include: none.  Current stressors include: Pt has worries that her symptoms will return after people ask her how is is doing. Reported previously that she worries about this maybe once a day on average.  In regards to medications, client reports: no issues and a remission of stiff back as a side effect. Reports no issues regarding sleep. Substance use: NA. Assessed safety. Client denies SI, denies intent, and denies " "plan. HI was not present. SIB was not present.  Safety plan has been created in the past. Safety plan was not reviewed today and includes speaking with , speaking with brother or other family, calling crisis lines, going to the nearest ED if SI/HI/SIB becomes overwhelming, worsens and/or becomes active. Client did identify concern to be addressed today - continued to discuss topic of relationships with family and boundary setting.     Began to discuss personal values at this visit. Plan to use this conversation to continue talking through boundary setting with family. Writer and client discussed her personal values and what they mean to her, began to discuss what she knows of the personal values of others in her family.     Laura has reported that she has experienced multiple traumatic experiences in her past, and did not want to discuss these events in any detail.     Writer used relational and interpersonal approach to explore feelings, motivations, and behavior. Writer offered support, feedback, validation, and reinforced use of skills taught in IRT from modalities including cognitive behavioral therapy, psycho education, and skills training. Promoted understanding of their experiences of psychosis and how it impacts them in important areas of their life and in recovery goals. Reflected on client's strengths and resiliency factors and facilitated discussion on how these can assist in symptom management, recovery, and well-being.     Home practice identified as: think about personal values        13. Plan/Referrals:     Next visit scheduled for next Tuesday at 2:00.    Billing for \"Interactive Complexity\"?    No    LORELEI Carrera Individual Resiliency Trainer  "

## 2023-11-08 ENCOUNTER — TELEPHONE (OUTPATIENT)
Dept: PSYCHIATRY | Facility: CLINIC | Age: 33
End: 2023-11-08

## 2023-11-08 DIAGNOSIS — F29 PSYCHOSIS, UNSPECIFIED PSYCHOSIS TYPE (H): Primary | ICD-10-CM

## 2023-11-08 NOTE — TELEPHONE ENCOUNTER
What is the concern that needs to be addressed by a nurse? Patient typically receives Invega injection at [facility]. She was told by care team at [facility] that her orders were cancelled and patient needs a refill. She isn't able to access medication injection scheduled for tomorrow.    May a detailed message be left on voicemail? Yes    Date of last office visit: 10/27/23    Message routed to: Psych RN Pool

## 2023-11-09 NOTE — TELEPHONE ENCOUNTER
Writer called patient to update her.  Left detailed message that orders had been faxed.  Clinic number provided should she have any further questions.

## 2023-11-10 ENCOUNTER — TELEPHONE (OUTPATIENT)
Dept: PSYCHIATRY | Facility: CLINIC | Age: 33
End: 2023-11-10

## 2023-11-10 NOTE — TELEPHONE ENCOUNTER
Lvm to rs 11/24/23 apt. Schedule patient for 11/27/ at 12:45. Writer left clinic number if new apt time and date dose not work for patient . Mailed out letter to patient.

## 2023-11-14 ENCOUNTER — VIRTUAL VISIT (OUTPATIENT)
Dept: PSYCHIATRY | Facility: CLINIC | Age: 33
End: 2023-11-14
Payer: COMMERCIAL

## 2023-11-14 DIAGNOSIS — F29 PSYCHOSIS, UNSPECIFIED PSYCHOSIS TYPE (H): Primary | ICD-10-CM

## 2023-11-14 NOTE — PROGRESS NOTES
NAVIGJESUS Clinician Contact & Progress Note  For Individual Resiliency Training (IRT)  A Part of the [facility] First Episode of Psychosis Program    NAVIGATE Enrollee: Laura Webb (1990)     MRN: [id number]  Date:  11/14/23  Diagnosis:Psychosis, unspecified  Clinician: MILAN Individual Resiliency Trainer, LORELEI Carrera     1. Type of contact: (majority of time spent)  IRT Session via telehealth  Mode of communication: [employer] (HIPAA compliant, secure platform). Patient consented verbally to this mode of therapy today.  Reason for telehealth: COVID-19. This patient visit was converted to a telehealth visit to minimize exposure to COVID-19.    2. People present:   Writer  Client: Yes    3. Length of Actual Contact: Start Time: 2:01; End Time: 2:58     4. Location of contact:  Originating Location (patient location): family home, located in [place], [place]  Distant Location (provider location): Home office, located in [place], [place], using appropriate privacy considerations and procedures    5. Did the client complete the home practice option(s) from the previous session: Completed    6. Motivational Teaching Strategies:  Connect info and skills with personal goals  Promote hope and positive expectations    7. Educational Teaching Strategies:  Review of written material/education  Relate information to client's experience  Ask questions to check comprehension  Break down information into small chunks    8. CBT Teaching Strategies:  Reinforcement and shaping (positive feedback for steps towards goals, gains in knowledge & skills and follow-through on home assignments)    9. IRT Module(s) Addressed:  Module 2 - Assessment/Initial Goal Setting    10. Techniques utilized:   [place] announced at beginning of session  Review of homework  Review of previous meeting  Present new material  Summarize progress made in current session  Other techniques (please specify):   -build rapport by  "discussing preferences, previous experiences of maribell, etc.  -Clarified patient's feelings and perspectives  -Elicited more details about current and recent circumstances  -Emotional support via active and reflective listening, responding to feelings etc.  -Identified patient's strengths/needs  -Identified goals for treatment plan  -Normalized and validated feelings and experiences  -Provided verbal overview of First Episode Program NAVIGATE services  -Provided positive feedback and encouragement  -Provided psychoeducation related to psychosis and related concerns.       11. Measures:    Mental Status Exam  Alertness: alert  and oriented  Behavior/Demeanor: cooperative, pleasant and calm  Speech: normal  Language: intact.   Mood: \"good\"  Thought Process/Associations: unremarkable  Thought Content:  Reports none;  Denies suicidal ideation and delusions  Perception:  Reports none;  Denies auditory hallucinations and visual hallucinations  Insight: adequate  Judgment: good and adequate for safety  Cognition: does  appear grossly intact; formal cognitive testing was not done    EMEKA-7  Not completed. Pt denied anxiety symptoms when asked.     PHQ-9  Not completed. Pt denied depressive symptoms when asked.     [place] Protocol Risk Identification  Pt denied SI when asked.     12. Assessment/Progress Note:     Writer and client met for IRT visit via zoom. Set agenda to check in; continue to expand on IRT modules and material. During check in, client reported having had a good week, shared that she prayed first thing and that she was focused on positives. . Reported current symptoms to include: none.  Current stressors include: Pt has worries that her symptoms of psychosis may return, especially when she is alone. Reported previously that she worries about this maybe once a day on average.  In regards to medications, client reports: no issues and a remission of stiff back as a side effect. Reports no issues regarding sleep. " "Substance use: NA. Assessed safety. Client denies SI, denies intent, and denies plan. HI was not present. SIB was not present.  Safety plan has been created in the past. Safety plan was not reviewed today and includes speaking with , speaking with brother or other family, calling crisis lines, going to the nearest ED if SI/HI/SIB becomes overwhelming, worsens and/or becomes active. Client did identify concern to be addressed today - reported that she continues to experience period anxiety related to fearing that she may \"lose control\" and start to have psychosis symptoms again. Specifically stated that this occurs more often when alone and even in the context of fearing closing her eyes to take a nap in the afternoon.     Writer provided eduction about the value to discussing these thoughts and discussing the experiences of the episode. Client was agreeable to discuss this further. Writer provided education about a coping strategy of using a cold pack on the sternum for 10-15 minutes when anxiety is too high. Writer described the physiology behind it and encouraged client to try it out to see how effective it is for her.     Continued to discuss personal values at this visit. Plan to use this conversation to continue talking through boundary setting with family. Writer and client discussed her personal values and what they mean to her, discussed the difference between personal values and personal goals. Identified differences and similarities between client and family members related to personal values. Writer introduced discussion about feelings of safety (physical, emotional, other) in relation to family members. Explored this topic relative to proximity to the episode contrasted to current feelings.      Laura has reported that she has experienced multiple traumatic experiences in her past, and did not want to discuss these events in any detail.     Writer used relational and interpersonal approach to " "explore feelings, motivations, and behavior. Writer offered support, feedback, validation, and reinforced use of skills taught in IRT from modalities including cognitive behavioral therapy, psycho education, and skills training. Promoted understanding of their experiences of psychosis and how it impacts them in important areas of their life and in recovery goals. Reflected on client's strengths and resiliency factors and facilitated discussion on how these can assist in symptom management, recovery, and well-being.     Home practice identified as: use a cold pack the next time you have a flare up of anxiety about symptoms returning.         13. Plan/Referrals:     Next visit scheduled for next Tuesday at 2:00.    Billing for \"Interactive Complexity\"?    No    LORELEI Carrera    NAVIGATE Individual Resiliency Trainer  "

## 2023-11-21 ENCOUNTER — VIRTUAL VISIT (OUTPATIENT)
Dept: PSYCHIATRY | Facility: CLINIC | Age: 33
End: 2023-11-21
Payer: COMMERCIAL

## 2023-11-21 DIAGNOSIS — F29 PSYCHOSIS, UNSPECIFIED PSYCHOSIS TYPE (H): Primary | ICD-10-CM

## 2023-11-21 NOTE — PROGRESS NOTES
NAVIGJESUS Clinician Contact & Progress Note  For Individual Resiliency Training (IRT)  A Part of the [facility] First Episode of Psychosis Program    NAVIGATE Enrollee: Laura Webb (1990)     MRN: [id number]  Date:  11/21/23  Diagnosis:Psychosis, unspecified  Clinician: MILAN Individual Resiliency Trainer, LORELEI Carrera     1. Type of contact: (majority of time spent)  IRT Session via telehealth  Mode of communication: [employer] (HIPAA compliant, secure platform). Patient consented verbally to this mode of therapy today.  Reason for telehealth: COVID-19. This patient visit was converted to a telehealth visit to minimize exposure to COVID-19.    2. People present:   Writer  Client: Yes    3. Length of Actual Contact: Start Time: 2:08; End Time: 2:55     4. Location of contact:  Originating Location (patient location): family home, located in [place], [place]  Distant Location (provider location): Home office, located in [place], [place], using appropriate privacy considerations and procedures    5. Did the client complete the home practice option(s) from the previous session: Completed    6. Motivational Teaching Strategies:  Connect info and skills with personal goals  Promote hope and positive expectations    7. Educational Teaching Strategies:  Review of written material/education  Relate information to client's experience  Ask questions to check comprehension  Break down information into small chunks    8. CBT Teaching Strategies:  Reinforcement and shaping (positive feedback for steps towards goals, gains in knowledge & skills and follow-through on home assignments)    9. IRT Module(s) Addressed:  Module 2 - Assessment/Initial Goal Setting    10. Techniques utilized:   [place] announced at beginning of session  Review of homework  Review of previous meeting  Present new material  Summarize progress made in current session  Other techniques (please specify):   -build rapport by  "discussing preferences, previous experiences of maribell, etc.  -Clarified patient's feelings and perspectives  -Elicited more details about current and recent circumstances  -Emotional support via active and reflective listening, responding to feelings etc.  -Identified patient's strengths/needs  -Identified goals for treatment plan  -Normalized and validated feelings and experiences  -Provided verbal overview of First Episode Program NAVIGATE services  -Provided positive feedback and encouragement  -Provided psychoeducation related to psychosis and related concerns.       11. Measures:    Mental Status Exam  Alertness: alert  and oriented  Behavior/Demeanor: cooperative, pleasant and calm  Speech: normal  Language: intact.   Mood: \"good\"  Thought Process/Associations: unremarkable  Thought Content:  Reports none;  Denies suicidal ideation and delusions  Perception:  Reports none;  Denies auditory hallucinations and visual hallucinations  Insight: adequate  Judgment: good and adequate for safety  Cognition: does  appear grossly intact; formal cognitive testing was not done    EMEKA-7  Not completed. Pt denied anxiety symptoms when asked.     PHQ-9  Not completed. Pt denied depressive symptoms when asked.     [place] Protocol Risk Identification  Pt denied SI when asked.     12. Assessment/Progress Note:     Writer and client met for IRT visit via zoom. Set agenda to check in; continue to expand on IRT modules and material. During check in, client reported having had a good week, shared that she prayed first thing and that she was focused on positives. . Reported current symptoms to include: none.  Current stressors include: Pt has worries that her symptoms of psychosis may return, especially when she is alone. Reported previously that she worries about this maybe once a day on average.  In regards to medications, client reports: no issues and a remission of stiff back as a side effect. Reports no issues regarding sleep. " "Substance use: NA. Assessed safety. Client denies SI, denies intent, and denies plan. HI was not present. SIB was not present.  Safety plan has been created in the past. Safety plan was not reviewed today and includes speaking with , speaking with brother or other family, calling crisis lines, going to the nearest ED if SI/HI/SIB becomes overwhelming, worsens and/or becomes active. Client did identify concern to be addressed today - reported that she continues to experience period anxiety related to fearing that she may \"lose control\" and start to have psychosis symptoms again. Specifically stated that this occurs more often when alone and even in the context of fearing closing her eyes to take a nap in the afternoon.     Reviewed personal strengths and personal values. Discussed expectations and boundary setting in the context of Client's cultural background. Client identified not wanting to continued working on setting boundaries at this time, but may revisit in future. Discussed client's experience of anxiety of returning symptoms and how the client vamsi. Reflected on the efficacy of coping strategies and reiterated previously described grounding techniques. Reflected on Laura's support system. Client and writer discussed the variability of mood from day to day and client agreed to to track the differences to try to identify any patterns.     Writer provided eduction about the value to discussing these thoughts and discussing the experiences of the episode. Client was agreeable to discuss this further. Writer provided education about a coping strategy of using a cold pack on the sternum for 10-15 minutes when anxiety is too high. Writer described the physiology behind it and encouraged client to try it out to see how effective it is for her. Writer also discussed use of sour candy to ground and reduce panic.      Laura has reported that she has experienced multiple traumatic experiences in her past, and " "did not want to discuss these events in any detail.     Writer used relational and interpersonal approach to explore feelings, motivations, and behavior. Writer offered support, feedback, validation, and reinforced use of skills taught in IRT from modalities including cognitive behavioral therapy, psycho education, and skills training. Promoted understanding of their experiences of psychosis and how it impacts them in important areas of their life and in recovery goals. Reflected on client's strengths and resiliency factors and facilitated discussion on how these can assist in symptom management, recovery, and well-being.     Home practice identified as: use a cold pack the next time you have a flare up of anxiety about symptoms returning. Track you good days and day that you are overwhelmed and make note of any patterns you see.     13. Plan/Referrals:     Next visit scheduled for next Tuesday at 2:00.    Billing for \"Interactive Complexity\"?    No    LORELEI Carrera    NAVIGATE Individual Resiliency Trainer  "

## 2023-11-27 ENCOUNTER — VIRTUAL VISIT (OUTPATIENT)
Dept: PSYCHIATRY | Facility: CLINIC | Age: 33
End: 2023-11-27
Payer: COMMERCIAL

## 2023-11-27 VITALS — HEIGHT: 63 IN | BODY MASS INDEX: 22.15 KG/M2 | WEIGHT: 125 LBS

## 2023-11-27 DIAGNOSIS — F29 PSYCHOSIS, UNSPECIFIED PSYCHOSIS TYPE (H): Primary | ICD-10-CM

## 2023-11-27 ASSESSMENT — PAIN SCALES - GENERAL: PAINLEVEL: NO PAIN (0)

## 2023-11-27 ASSESSMENT — PATIENT HEALTH QUESTIONNAIRE - PHQ9: SUM OF ALL RESPONSES TO PHQ QUESTIONS 1-9: 0

## 2023-11-27 NOTE — PROGRESS NOTES
"Virtual Visit Details    Originating Location (pt. Location): Home    Distant Location (provider location):  On-site  Platform used for Video Visit: [employer]    [facility] Medication Management Progress Note  A Part of the [facility] First Episode of Psychosis Program    NAVIGATE Enrollee: Laura Webb (1990)     MRN: [id number]  Date:  23         Contributors to the Assessment     Chart Reviewed.   Interview completed with Laura Webb.  Collateral information obtained from Laura.         Chief Complaint      \"Okay\"         Interim History    Laura Webb is a 33 year old female who was last seen in MD clinic on 10/27/23  at which time no medication changes were made. The patient reports good treatment adherence.  History was provided by Laura who was a fair historian.  Since the last visit:  -Did catering for Thanksgiving, spent time with family  -Muscle stiffness--Cogentin helping with that  -Not really to anxiety, making sure that something   -No new changes or new stressors in life  -Holidays are pretty busy, can be a bit stressful, but mostly fun. Hosted Thanksgiving  -Were going to go visit family    PSYCH ROS:  Clinician Rating Form in COMPASS  1. Depressed Mood: Ratin  0 Not reported     1 Very mild occasionally feels sad or  down ; of questionable clinical significance   2 Mild occasionally feels moderately depressed or often feels sad or  down    3 Moderate occasionally feels very depressed or often feels moderately depressed   4 Moderately severe often feels very depressed   5 Severe feels very depressed most of the time   6 Very severe constant extremely painful feelings of depression   U Unable to assess        2. Anxiety/Worry: Ratin  0 Not reported     1 Very mild occasionally feels a little anxious; of questionable clinical significance   2 Mild occasionally feels moderately anxious or often feels a little anxious or worried   3 Moderate occasionally feels very anxious or often " feels moderately anxious   4 Moderately severe often feels very anxious or often feels moderately anxious   5 Severe feels very anxious or worried most of the time   6 Very severe patient is continually preoccupied with severe anxiety   U Unable to assess        3. Suicidal Ideation/Behavior: Ratin          0 Not reported     1 Very mild occasional thoughts of dying,  I d be better off dead  or  I wish I were dead    2 Mild frequents thoughts of dying or occasional thoughts of killing self, without plan or method   3 Moderate often thinks of suicide or has though of a specific method   4 Moderately severe has mentally rehearsed a specific method of suicide or has made a suicide attempt with questionable intent to die (e.r. takes aspirins and then tells family)   5 Severe has made preparations for a potentially lethal suicide attempt (e.g acquires a gun and bullets for an attempt)   6 Very severe has made a suicide attempt with an intent to die   U Unable to assess                      4. Elevated/Expansive Mood: Ratin  0 Not at all     1 Very mild questionable; more cheerful than most people in his/her circumstances but of only possible clinical significance   2 Mild brief elevated/expansive mood but only somewhat out of proportion to the circumstances   3 Moderate brief/occasional elevation of mood which is clearly out of proportion to the circumstances   4 Moderately severe sustained/frequent elevation of mood which is clearly out of proportion to the circumstances   5 Severe mood is euphoric most of the time   6 Very severe sustained elevation;  everything is wonderful  almost all of the time   U Unable to assess        5. Hostility/Anger/Irritability/Aggressiveness: Ratin  0 Not at all     1 Very mild occasional irritability of doubtful clinical significance   2 Mild occasionally feels angry or mild or indirect expressions of anger, e.g. sarcasm, disrespect or hostile gestures   3 Moderate frequently  feels angry, frequent irritability or occasional direct expression of anger, e.g. yelling at others   4 Moderately severe often feels very angry, often yells at others or occasionally threatens to harm others   5 Severe has acted on his anger by becoming physically abusive on one or two occasions or makes frequent threats to harm others or is very angry most of the time   6 Very severe has been physically aggressive and/or required intervention to prevent assaultiveness on several occasions; or any serious assaultive act   U Unable to assess        6. Impulsive Behavior: Ratin  0 Not at all     1 Very mild one instance of impulsive behavior which is of doubtful clinical significance   2 Mild occasional impulsive acts, e.g. making phone calls at odd hours   3 Moderate occasional impulsive acts with some potential negative consequence, e.g. leaving work abruptly; changing plans without thinking   4 Moderately severe impulsive acts with definite negative consequences, e.g. overspending on non-essentials; repeated reckless sexual behavior   5 Severe impulsive acts with direct negative consequences, e.g. spends entire income on nonessentials without regard for basic needs   6 Very severe impulsive behavior which is potentially life threatening, e.g. jumps from dangerous height (without suicidal intent) or criminal behavior, e.g. impulsive robbery   U Unable to assess        7. Suspiciousness: Ratin  0 Not present     1 Very mild Seems on guard. Reluctant to respond to some  personal  questions. Reports being overly self-conscious in public   2 Mild Describes incidents in which others have harmed or wanted to harm him/her that sound plausible. Patient feels as if others are watching, laughing, or criticizing him/her in public, but this occurs only occasionally or rarely. Little or no preoccupation   3 Moderate Says others are talking about him/her maliciously, have negative intentions, or may harm him/her. Beyond  the likelihood of plausibility, but not delusional. Incidents of suspected persecution occur occasionally (less than once per week) with some preoccupation   4 Moderately severe Same as 3, but incidents occur frequently such as more than once a week. Patient is moderately preoccupied with ideas of persecution OR patient reports persecutory delusions expressed with much doubt (e.g. partial delusion)   5 Severe Delusional -- speaks of Mafia plots, the FBI, or others poisoning his/her food, persecution by supernatural forces   6 Extremely severe Same as 5, but the beliefs are bizarre or more preoccupying. Patient tends to disclose or act on persecutory delusions.   U Unable to assess        8. Unusual Thought Content: Ratin  0 Not present     1 Very mild Ideas of reference (people may stare or may laugh at him), ideas of persecution (people may mistreat him). Unusual beliefs in psychic green, spirits, UFOs, or unrealistic beliefs in one's own abilities. Not strongly held. Some doubt   2 Mild Same as 1, but degree of reality distortion is more severe as indicated by highly unusual ideas or greater conviction. Content may be typical of delusions (even bizarre), but without full conviction. The delusion does not seem to have fully formed, but is considered as one possible explanation for an unusual experience   3 Moderate Delusion present but no preoccupation or functional impairment. May be an encapsulated delusion or a firmly endorsed absurd belief about past delusional circumstances   4 Moderately severe Full delusion(s) present with some preoccupation OR some areas of functioning disrupted by delusional thinking   5 Severe Full delusion(s) present with much preoccupation OR many areas of functioning are disrupted by delusional thinking   6 Extremely severe Full delusions present with almost   U Unable to assess        9. Hallucinations: Ratin  0 Not present     1 Very mild While resting or going to sleep,  sees visions, smells odors, or hears voices, sounds or whispers in the absence of external stimulation, but no impairment in functioning   2 Mild While in a clear state of consciousness, hears a voice calling the subject s name, experiences non-verbal auditory hallucinations (e.g., sounds or whispers), formless visual hallucinations, or has sensory experiences in the presence of a modality-relevant stimulus (e.g., visual illusions) infrequently (e.g., 1-2 times per week) and with no functional impairment   3 Moderate Occasional verbal, visual, gustatory, olfactory, or tactile hallucinations with no functional impairment OR non-verbal auditory hallucinations/visual illusions more than infrequently or with impairment   4 Moderately severe Experiences daily hallucinations OR some areas of functioning are disrupted by hallucinations   5 Severe Experiences verbal or visual hallucinations several times a day OR many areas of functioning are disrupted by these hallucinations   6 Extremely severe Persistent verbal or visual hallucinations throughout the day OR most areas of functioning are disrupted by these hallucinations   U Unable to assess        10. Conceptual Disorganization: Ratin  0 Not present     1 Very mild Peculiar use of words or rambling but speech is comprehensible   2 Mild Speech a bit hard to understand or make sense of due to tangentiality, circumstantiality, or sudden topic shifts   3 Moderate Speech difficult to understand due to tangentiality, circumstantiality, idiosyncratic speech, or topic shifts on many occasions OR 1-2 instances of incoherent phrases   4 Moderately severe Speech difficult to understand due to circumstantiality, tangentiality, neologisms, blocking, or topic shifts most of the time OR 3-5 instances of incoherent phrases   5 Severe Speech is incomprehensible due to severe impairments most of the time. Many PSRS items cannot be rated by self-report alone   6 Extremely severe Speech  is incomprehensible throughout interview   U Unable to assess        11. Avolition/Apathy: Ratin  0 Not at all     1 Very mild questionable decrease in time spent in goal-directed activities   2 Mild spends less time in goal-directed activities than is appropriate for situation and age   3 Moderate initiates activities at times but does not follow through   4 Moderately severe rarely initiates activity but will passively engage with encouragement   5 Severe almost never initiates activities; requires assistance to accomplish basic activities   6 Very severe does not initiate or persist in any goal-directed activity even with outside assistance   U Unable to assess        12. Asociality/Low Social Drive: Ratin  0 Not at all     1 Very mild questionable   2 Mild slow to initiate social interactions but usually responds to overtures by others   3 Moderate rarely initiates social interactions; sometimes responds to overtures by others   4 Moderately severe does not initiate but sometimes responds to overtures by others; little social interaction outside close family members   5 Severe never initiates and rarely encourages conversations or activities; avoids being with others unless prodded, may have contacts with family   6 Very severe avoids being with others (even family members) whenever possible, extreme social isolation   U Unable to assess        13. Adherence: Days: 0  The longest, continuous amount of time in days, since the last visit when the subject did not take medication      14. EPS Part I: Ratin  Rate Elbow Rigidity for all subjects  0 Normal   1 Slight stiffness and resistance   2 Moderate stiffness and resistance   3 Marked rigidity with difficulty in passive movement   4 Extreme stiffness and rigidity with almost a frozen joint   U Unable to assess            EPS Part 2: Signs of EPS: 0  Are there are other signs of EPS (eg diminished arm swing, postural instability, cogwheeling, tremor,  akinesia) present based upon patient report or exam?  0 No   1 Yes      15. Akathesia: Ratin  0 No restlessness reported or observed   1 Mild restlessness observed; e.g., occasional jiggling of the foot occurs when subject is seated   2 Moderate restlessness observed; e.g., on several occasions, jiggles foot, crosses and uncrosses legs or twists a part of the body   3 Restlessness is frequently observed; e.g., the foot or legs moving most of the time   4 Restlessness persistently observed; e.g., subject cannot sit still, may get up and walk   U Unable to assess      16. Dyskinetic Movement Ratings: Ratin  0 None   1 Minimal, may be extreme normal   2 Mild   3 Moderate   4 Severe   U Unable to assess      SIDE EFFECT ASSESSMENT:  Clinician Rating Form in [facility]PASS - Side Effect Assessment  Address side effects reported by the patient and rate using this scale  0 Not present   1 Minimal, may be extreme normal   2 Mild   3 Moderate   4 Severe   U Unable to assess   P Present, but not related      Feeling dizzy or faint: 0  Blurred vision: 0  Dry mouth: 0  Too much saliva/droolin  Nausea:  0  Constipation: 0  Increased appetite: 0  Weight gain: 0  Weight loss: 0  Feeling tired/fatigue: 0  Daytime sedation: 0  Hypersomnia: 0  Insomnia: 0  low libido: 0  Other problems with sex: 0  Breast enlargement or discharge:  0  Irregular Menstruation or amenorrhea: 0  Other (please list and rate): 0     RECENT SUBSTANCE USE:  Clinician Rating Form in [employer] [facility] - Substance Use Assessment  Alcohol Use Severity: Ratin  0 none   1 use without impairment: drinks but no immediate social or medical impairment   2 use with impairment: e.g. becomes grossly intoxicated; alcohol use or withdrawal compromises school, work or social functioning; alcohol use or withdrawal exacerbates symptoms (e.g. gets depressed when drinking)      Marijuana Use Severity: Ratin  0 none   1 occasional use without impairment: e.g. uses marijuana a  few days a month and has no immediate social or medical impairment   2 frequent use without impairment: e.g. uses marijuana several or more days a week but has no immediate social or medical impairment   3 use with impairment: e.g. becomes grossly intoxicated; marijuana use compromises school, work or social functioning; marijuana use exacerbates symptoms (e.g. gets paranoid when using)      Other Drug Use Severity: Ratin  0 none   1 occasional use without impairment: e.g. uses drug(s) a few days a month and has no immediate social or medical impairment   2 frequent use without impairment: e.g. uses drug(s) several or more days a week but has no immediate social or medical impairment   3 use with impairment: e.g. becomes grossly intoxicated; drug use compromises school, work or social functioning; drug use exacerbates symptoms (e.g. gets paranoid when using)      RECENT SOCIAL HISTORY:  SEE HPI    Medical ROS:  none         First Episode of Psychosis History      DUP (duration untreated psychosis):  several days to eight months  Route to initial care: hospitalization  Medication adherence overall:  See above, Clinician Rating Form in COMPASS Item 13  General frequency of visits:  monthly  Participation in groups:  No    Reviewed for completion of First Episode work-up:  Yes  First episode workup:  Not Done (if completed, see LABS for results)  MATRICS Consensus Cognitive Battery:  Not Done (if completed, see LABS for results)         Medical/Surgical History     Blood-group specific substance    There is no problem list on file for this patient.           Medications     Current Outpatient Medications   Medication Sig Dispense Refill    benztropine (COGENTIN) 0.5 MG tablet Take 1 tablet (0.5 mg) by mouth 2 times daily 60 tablet 2    paliperidone (INVEGA SUSTENNA) 156 MG/ML TERRELL injection Inject 1 mL (156 mg) into the muscle every 28 days 1 mL 11             Vitals     There were no vitals taken for this visit.           Mental Status Exam     Alertness: alert  and oriented  Appearance: well groomed  Behavior/Demeanor: pleasant and calm, with fair  eye contact   Speech: normal  Language: no obvious problem  Psychomotor: normal or unremarkable  Mood:  OK  Affect: blunted; was congruent to mood; was congruent to content  Thought Process/Associations: unremarkable  Thought Content:  Reports none;  Denies suicidal and violent ideation and delusions  Perception:  Reports none;  Denies auditory hallucinations and visual hallucinations  Insight:  difficult to assess as her memory of certain events is limited  Judgment: good, adequate for safety  Cognition: does  appear grossly intact; formal cognitive testing was not done         Labs and Data     RATING SCALES: AIMS due    PHQ9 TODAY =       8/4/2023    11:00 AM 9/22/2023    11:15 AM   PHQ-9 SCORE   PHQ-9 Total Score 0 1       ANTIPSYCHOTIC LABS ROUTINE    [glu, A1C, lipids (focus LDL), liver enzymes, WBC, ANEU, Hgb, plts]   q12 mo  No lab results found.  No lab results found.  No lab results found.  No lab results found.    Narrative & Impression   EXAM: MR BRAIN W/O and W CONTRAST  LOCATION: [facility]  DATE: 8/22/2023     INDICATION:  Psychosis, unspecified psychosis type (H)  COMPARISON: None.  CONTRAST: 7ml Gadavist  TECHNIQUE: Routine multiplanar multisequence head MRI without and with intravenous contrast.     FINDINGS:  INTRACRANIAL CONTENTS: No acute or subacute infarct. No mass, acute hemorrhage, or extra-axial fluid collections. Normal brain parenchymal signal. Normal ventricles and sulci. Normal position of the cerebellar tonsils. No pathologic contrast enhancement.   Posterior fossa structures including cerebellar hemispheres, fourth ventricle and CP angle cisterns are clear. Nasopharynx is clear. Region of the sella and suprasellar cistern are clear.     SELLA: No abnormality accounting for technique.     OSSEOUS STRUCTURES/SOFT TISSUES:  Normal marrow signal. The major intracranial vascular flow voids are maintained.      ORBITS: No abnormality accounting for technique.      SINUSES/MASTOIDS: No paranasal sinus mucosal disease. No middle ear or mastoid effusion.                                                                    IMPRESSION:  1.  Normal head MRI.            Psychiatric Diagnoses     Schizophreniform disorder, with positive prognostic features  H/o catatonia         Assessment     From intake, 8/4/23: Laura Webb is a 32 year old  Black or  Not  or  female who presented for a comprehensive assessment of psychiatric symptoms by the First Episode of Psychosis Navigate Program. Diagnostically challenging given single-episode of mental illness. Differential is broad including psychosis due to medical condition (seizure), bipolar disorder, MDD single episode with psychotic features. Substances do not appear to be a contributing factor. Schizophrenia also considered, though unlikely given rapid-onset of her symptoms and high cognitive and psychosocial functioning following her first episode / hospitalization. Her chart review and interview today present as most consistent with schizophreniform disorder. Prognosis is fair-to-good. Rapid onset of symptoms is positive predictive factor for resolution of symptoms / return to baseline.   We discussed this assessment with her today. We discussed our recommendations: to continue current medications, complete her medical / neurological workup to rule-out underlying epileptic and autoimmune etiologies. Recommend continuing long-acting-injectable for now, ideally for 1 year following resolution of her psychotic symptoms. She expressed understanding of this plan.      TODAY Laura reports she has been doing well recently. Denies any new mental health symptoms or side effects. Feels she is coping well with stressors. No med changes indicated today. Continue to  recommend FEP labs but she has not gotten these yet.     PSYCHOTROPIC DRUG INTERACTIONS:   None.  MANAGEMENT:  N/A         Plan   1) Medications   - continue Invega Sustenna 156 mg IM q28 days (gets this at [place])  - continue benztropine 0.5 mg BID      2) Therapy-  weekly IRT      3) Next Due   Labs- remaining first episode labs ordered 08/10/23   EKG- last completed 4/20/23, repeat if increasing or adding Qtc- prolonging medications   Rating scales- AIMS: due 8/2024     4) Referrals  Medical concerns- seizure history, neurology referral for EEG; she expresses hesitance about this       5) RTC: 1 month     6) Crisis Numbers:   Provided routinely in AVS     After hours:  [phone]    TREATMENT RISK STATEMENT:  The risks, benefits, alternatives and potential adverse effects have been discussed and are understood by the pt. The pt understands the risks of using street drugs or alcohol. There are no medical contraindications, the pt agrees to treatment with the ability to do so. The pt knows to call the clinic for any problems or to access emergency care if needed.  Medical and substance use concerns are documented above.  Psychotropic drug interaction check was done, including changes made today.      PROVIDER:  Yaneth Guzmán MD

## 2023-11-27 NOTE — NURSING NOTE
Is the patient currently in the state of [place]? YES    Visit mode:VIDEO    If the visit is dropped, the patient can be reconnected by: VIDEO VISIT: Text to cell phone:   Telephone Information:   Mobile [phone]       Will anyone else be joining the visit? NO  (If patient encounters technical issues they should call [phone])    How would you like to obtain your AVS? MyChart    Are changes needed to the allergy or medication list? No    Reason for visit: RECHECK    Medications and allergies have been reviewed.     Kaden Zhu VVGABY    NAVIGATE Patient Self-Rating Form    Since your last medication management visit--    Have you been feeling depressed, sad, or down? No  Have you been feeling anxious, worried or nervous? No  Have you been thinking about death or have you had any feelings that you would be better off dead? No   Have you been feeling particularly good? Yes  Have you been feeling annoyed, angry, or resentful (whether you showed it or not)? No  Did you do anything that could have gotten you in trouble? No  Have you felt dizzy or faint? No  Have you had blurred vision? No  Have you had dry mouth? No  Have you had too much saliva in your mouth or had drooling? No  Have you felt nauseous? No  Have you been constipated? No  Has you appetite for food been increased? No  Have you gained weight? No  Have you lost weight? No  Have you felt restless or like you cannot sit still? No  Any shaking of your hands, legs, or other muscles? No  Any problems walking or moving or any problems feeling stiff or rigid? No  Have you felt tired or fatigued? No  Have you felt drowsy during the day? No  Have you been sleeping too much at night? No  Have you been sleeping too little or had problems sleeping at night? No  Any decrease in your interest in sex? N/A  Any other problems with sex? N/A  Any problems with your breasts such as swelling or discharge? No  For women, any problems with your period? No  Are there other  medical or side effect problems you wish to discuss with your prescriber? No  Since your last visit, how many days have you not taken your medication? None  Have you had trouble remembering to take your medication? No  Do you find the number of medicines or the times when you are supposed to take then confusing or burdensome? No  Are you afraid of the medication? No  Do you think that you have an illness that requires taking medication? Yes  Do you think that other people would think poorly of you if they knew that you take medication? No  On average, how many cigarettes do you smoke per day? N/A   Since you last visit, did you drink alcohol? No  Since your last visit, have you used any marijuana? No  Since your last visit, have you used any stress drugs other than marijuana? No  Between now and your next visit, do you think we should keep your medication the same or consider changing the medications? Keep medication the same.

## 2023-11-28 ENCOUNTER — VIRTUAL VISIT (OUTPATIENT)
Dept: PSYCHIATRY | Facility: CLINIC | Age: 33
End: 2023-11-28
Payer: COMMERCIAL

## 2023-11-28 DIAGNOSIS — F29 PSYCHOSIS, UNSPECIFIED PSYCHOSIS TYPE (H): Primary | ICD-10-CM

## 2023-11-28 NOTE — PROGRESS NOTES
NAVIGJESUS Clinician Contact & Progress Note  For Individual Resiliency Training (IRT)  A Part of the [facility] First Episode of Psychosis Program    NAVIGATE Enrollee: Laura Webb (1990)     MRN: [id number]  Date:  11/28/23  Diagnosis:Psychosis, unspecified  Clinician: MILAN Individual Resiliency Trainer, LORELEI Carrera     1. Type of contact: (majority of time spent)  IRT Session via telehealth  Mode of communication: [employer] (HIPAA compliant, secure platform). Patient consented verbally to this mode of therapy today.  Reason for telehealth: COVID-19. This patient visit was converted to a telehealth visit to minimize exposure to COVID-19.    2. People present:   Writer  Client: Yes    3. Length of Actual Contact: Start Time: 2:05; End Time: 2:56     4. Location of contact:  Originating Location (patient location): family home, located in [place], [place]  Distant Location (provider location): Home office, located in [place], [place], using appropriate privacy considerations and procedures    5. Did the client complete the home practice option(s) from the previous session: Completed    6. Motivational Teaching Strategies:  Connect info and skills with personal goals  Promote hope and positive expectations    7. Educational Teaching Strategies:  Review of written material/education  Relate information to client's experience  Ask questions to check comprehension  Break down information into small chunks    8. CBT Teaching Strategies:  Reinforcement and shaping (positive feedback for steps towards goals, gains in knowledge & skills and follow-through on home assignments)    9. IRT Module(s) Addressed:  Module 9 - Dealing with Negative Feelings    10. Techniques utilized:   [place] announced at beginning of session  Review of homework  Review of previous meeting  Present new material  Summarize progress made in current session  Other techniques (please specify):   -build rapport by  "discussing preferences, previous experiences of maribell, etc.  -Clarified patient's feelings and perspectives  -Elicited more details about current and recent circumstances  -Emotional support via active and reflective listening, responding to feelings etc.  -Identified patient's strengths/needs  -Identified goals for treatment plan  -Normalized and validated feelings and experiences  -Provided verbal overview of First Episode Program NAVIGATE services  -Provided positive feedback and encouragement  -Provided psychoeducation related to psychosis and related concerns.       11. Measures:    Mental Status Exam  Alertness: alert  and oriented  Behavior/Demeanor: cooperative, pleasant and calm  Speech: normal  Language: intact.   Mood: \"good\"  Thought Process/Associations: unremarkable  Thought Content:  Reports none;  Denies suicidal ideation and delusions  Perception:  Reports none;  Denies auditory hallucinations and visual hallucinations  Insight: adequate  Judgment: good and adequate for safety  Cognition: does  appear grossly intact; formal cognitive testing was not done    EMEKA-7  Not completed. Pt denied anxiety symptoms when asked.     PHQ-9  Not completed. Pt denied depressive symptoms when asked.     [place] Protocol Risk Identification  Pt denied SI when asked.     12. Assessment/Progress Note:     Writer and client met for IRT visit via zoom. Set agenda to check in; continue to expand on IRT modules and material. During check in, client reported having had a good week, shared that she prayed first thing and that she was focused on positives. . Reported current symptoms to include: none.  Current stressors include: Pt has worries that her symptoms of psychosis may return, especially when she is alone. Reported previously that she worries about this maybe once a day on average.  In regards to medications, client reports: no issues and a remission of stiff back as a side effect. Reports no issues regarding sleep. " "Substance use: NA. Assessed safety. Client denies SI, denies intent, and denies plan. HI was not present. SIB was not present.  Safety plan has been created in the past. Safety plan was not reviewed today and includes speaking with , speaking with brother or other family, calling crisis lines, going to the nearest ED if SI/HI/SIB becomes overwhelming, worsens and/or becomes active. Client reports having had a very pleasant Thanksgiving holiday with family.     Client did identify concern to be addressed today - reported that she continues to experience periodic anxiety related to fearing that she may \"lose control\" and start to have psychosis symptoms again. Specifically stated that this occurs more often when alone and even in the context of fearing closing her eyes to take a nap in the afternoon. Client denied any instances of this symptoms in the psat week, but does generally report this occurring 2-3 times a week. Writer introduced module about dealing with negative feelings. Discussed an overview of the module and the objectives.     Laura has reported that she has experienced multiple traumatic experiences in her past, and did not want to discuss these events in any detail.     Writer used relational and interpersonal approach to explore feelings, motivations, and behavior. Writer offered support, feedback, validation, and reinforced use of skills taught in IRT from modalities including cognitive behavioral therapy, psycho education, and skills training. Promoted understanding of their experiences of psychosis and how it impacts them in important areas of their life and in recovery goals. Reflected on client's strengths and resiliency factors and facilitated discussion on how these can assist in symptom management, recovery, and well-being.     Home practice identified as: use a cold pack the next time you have a flare up of anxiety about symptoms returning. Track your good days and day that you are " "overwhelmed and make note of any patterns you see.     13. Plan/Referrals:     Next visit scheduled for next Tuesday at 2:00.    Billing for \"Interactive Complexity\"?    No    LORELEI Carrera    NAVIGATE Individual Resiliency Trainer  "

## 2023-12-05 ENCOUNTER — VIRTUAL VISIT (OUTPATIENT)
Dept: PSYCHIATRY | Facility: CLINIC | Age: 33
End: 2023-12-05
Payer: COMMERCIAL

## 2023-12-05 DIAGNOSIS — F29 PSYCHOSIS, UNSPECIFIED PSYCHOSIS TYPE (H): Primary | ICD-10-CM

## 2023-12-06 NOTE — PROGRESS NOTES
NAVIGJESUS Clinician Contact & Progress Note  For Individual Resiliency Training (IRT)  A Part of the [facility] First Episode of Psychosis Program    NAVIGATE Enrollee: Laura Webb (1990)     MRN: [id number]  Date:  12/05/23  Diagnosis:Psychosis, unspecified  Clinician: MILAN Individual Resiliency Trainer, LORELEI Carrera     1. Type of contact: (majority of time spent)  IRT Session via telehealth  Mode of communication: [employer] (HIPAA compliant, secure platform). Patient consented verbally to this mode of therapy today.  Reason for telehealth: COVID-19. This patient visit was converted to a telehealth visit to minimize exposure to COVID-19.    2. People present:   Writer  Client: Yes    3. Length of Actual Contact: Start Time: 2:05; End Time: 2:56     4. Location of contact:  Originating Location (patient location): family home, located in [place], [place]  Distant Location (provider location): Home office, located in [place], [place], using appropriate privacy considerations and procedures    5. Did the client complete the home practice option(s) from the previous session: Completed    6. Motivational Teaching Strategies:  Connect info and skills with personal goals  Promote hope and positive expectations    7. Educational Teaching Strategies:  Review of written material/education  Relate information to client's experience  Ask questions to check comprehension  Break down information into small chunks    8. CBT Teaching Strategies:  Reinforcement and shaping (positive feedback for steps towards goals, gains in knowledge & skills and follow-through on home assignments)    9. IRT Module(s) Addressed:  Module 9 - Dealing with Negative Feelings    10. Techniques utilized:   [place] announced at beginning of session  Review of homework  Review of previous meeting  Present new material  Summarize progress made in current session  Other techniques (please specify):   -build rapport by  "discussing preferences, previous experiences of maribell, etc.  -Clarified patient's feelings and perspectives  -Elicited more details about current and recent circumstances  -Emotional support via active and reflective listening, responding to feelings etc.  -Identified patient's strengths/needs  -Identified goals for treatment plan  -Normalized and validated feelings and experiences  -Provided verbal overview of First Episode Program NAVIGATE services  -Provided positive feedback and encouragement  -Provided psychoeducation related to psychosis and related concerns.       11. Measures:    Mental Status Exam  Alertness: alert  and oriented  Behavior/Demeanor: cooperative, pleasant and calm  Speech: normal  Language: intact.   Mood: \"good\"  Thought Process/Associations: unremarkable  Thought Content:  Reports none;  Denies suicidal ideation and delusions  Perception:  Reports none;  Denies auditory hallucinations and visual hallucinations  Insight: adequate  Judgment: good and adequate for safety  Cognition: does  appear grossly intact; formal cognitive testing was not done    EMEKA-7  Not completed. Pt denied anxiety symptoms when asked.     PHQ-9  Not completed. Pt denied depressive symptoms when asked.     [place] Protocol Risk Identification  Pt denied SI when asked.     12. Assessment/Progress Note:     Writer and client met for IRT visit via zoom. Set agenda to check in; continue to expand on IRT modules and material. During check in, client reported having had a good week, shared that she prayed first thing and that she was focused on positives. . Reported current symptoms to include: none.  Current stressors include: Pt has worries that her symptoms of psychosis may return, especially when she is alone. Reported previously that she worries about this maybe once a day on average.  In regards to medications, client reports: no issues and a remission of stiff back as a side effect. Reports no issues regarding sleep. " "Substance use: NA. Assessed safety. Client denies SI, denies intent, and denies plan. HI was not present. SIB was not present.  Safety plan has been created in the past. Safety plan was not reviewed today and includes speaking with , speaking with brother or other family, calling crisis lines, going to the nearest ED if SI/HI/SIB becomes overwhelming, worsens and/or becomes active.     Client did identify concern to be addressed today - reported that she continues to experience periodic anxiety related to fearing that she may \"lose control\" and start to have psychosis symptoms again. Specifically stated that this occurs more often when alone and even in the context of fearing closing her eyes to take a nap in the afternoon. Client denied any instances of this symptoms in the psat week, but does generally report this occurring 2-3 times a week. Continued module about dealing with negative feelings. Introduced the thought-feeling triangle and worked with client to fill out her own triangle. Client as tearful at various time during visit. Writer offered additional visit this week due to the acute negative symptoms the client was experiencing - client requested that we meet again on Thursday this week.     Laura has reported that she has experienced multiple traumatic experiences in her past, and did not want to discuss these events in any detail.     Writer used relational and interpersonal approach to explore feelings, motivations, and behavior. Writer offered support, feedback, validation, and reinforced use of skills taught in IRT from modalities including cognitive behavioral therapy, psycho education, and skills training. Promoted understanding of their experiences of psychosis and how it impacts them in important areas of their life and in recovery goals. Reflected on client's strengths and resiliency factors and facilitated discussion on how these can assist in symptom management, recovery, and " "well-being.     Home practice identified as: use a cold pack the next time you have a flare up of anxiety about symptoms returning. Track your good days and day that you are overwhelmed and make note of any patterns you see.     13. Plan/Referrals:     Next visit scheduled for 12/7 at 11:00    Billing for \"Interactive Complexity\"?    No    LORELEI Carrera    NAVIGATE Individual Resiliency Trainer  "

## 2023-12-07 ENCOUNTER — VIRTUAL VISIT (OUTPATIENT)
Dept: PSYCHIATRY | Facility: CLINIC | Age: 33
End: 2023-12-07
Payer: COMMERCIAL

## 2023-12-07 DIAGNOSIS — F29 PSYCHOSIS, UNSPECIFIED PSYCHOSIS TYPE (H): Primary | ICD-10-CM

## 2023-12-07 NOTE — PROGRESS NOTES
NAVIGJESUS Clinician Contact & Progress Note  For Individual Resiliency Training (IRT)  A Part of the [facility] First Episode of Psychosis Program    NAVIGATE Enrollee: Laura Webb (1990)     MRN: [id number]  Date:  12/07/23  Diagnosis:Psychosis, unspecified  Clinician: MILAN Individual Resiliency Trainer, LORELEI Carrera     1. Type of contact: (majority of time spent)  IRT Session via telehealth  Mode of communication: [employer] (HIPAA compliant, secure platform). Patient consented verbally to this mode of therapy today.  Reason for telehealth: COVID-19. This patient visit was converted to a telehealth visit to minimize exposure to COVID-19.    2. People present:   Writer  Client: Yes    3. Length of Actual Contact: Start Time: 11:00; End Time: 11:45     4. Location of contact:  Originating Location (patient location): family home, located in [place], [place]  Distant Location (provider location): Home office, located in [place], [place], using appropriate privacy considerations and procedures    5. Did the client complete the home practice option(s) from the previous session: Completed    6. Motivational Teaching Strategies:  Connect info and skills with personal goals  Promote hope and positive expectations    7. Educational Teaching Strategies:  Review of written material/education  Relate information to client's experience  Ask questions to check comprehension  Break down information into small chunks    8. CBT Teaching Strategies:  Reinforcement and shaping (positive feedback for steps towards goals, gains in knowledge & skills and follow-through on home assignments)    9. IRT Module(s) Addressed:  Module 9 - Dealing with Negative Feelings    10. Techniques utilized:   [place] announced at beginning of session  Review of homework  Review of previous meeting  Present new material  Summarize progress made in current session  Other techniques (please specify):   -build rapport by  "discussing preferences, previous experiences of maribell, etc.  -Clarified patient's feelings and perspectives  -Elicited more details about current and recent circumstances  -Emotional support via active and reflective listening, responding to feelings etc.  -Identified patient's strengths/needs  -Identified goals for treatment plan  -Normalized and validated feelings and experiences  -Provided verbal overview of First Episode Program NAVIGATE services  -Provided positive feedback and encouragement  -Provided psychoeducation related to psychosis and related concerns.       11. Measures:    Mental Status Exam  Alertness: alert  and oriented  Behavior/Demeanor: cooperative, pleasant and calm  Speech: normal  Language: intact.   Mood: \"good\"  Thought Process/Associations: unremarkable  Thought Content:  Reports none;  Denies suicidal ideation and delusions  Perception:  Reports none;  Denies auditory hallucinations and visual hallucinations  Insight: adequate  Judgment: good and adequate for safety  Cognition: does  appear grossly intact; formal cognitive testing was not done    EMEKA-7  Not completed. Pt denied anxiety symptoms when asked.     PHQ-9  Not completed. Pt denied depressive symptoms when asked.     [place] Protocol Risk Identification  Pt denied SI when asked.     12. Assessment/Progress Note:     Writer and client met for IRT visit via zoom. Set agenda to check in; continue to expand on IRT modules and material. During check in, client reported having had a difficult week . Reported current symptoms to include: feelings of overwhelm and anxiety.  Current stressors include: Pt has worries that her symptoms of psychosis may return, especially when she is alone. Reported previously that she worries about this maybe once a day on average.  In regards to medications, client reports: no issues and a remission of stiff back as a side effect. Reports no issues regarding sleep. Substance use: NA. Assessed safety. " "Client denies SI, denies intent, and denies plan. HI was not present. SIB was not present.  Safety plan has been created in the past. Safety plan was not reviewed today and includes speaking with , speaking with brother or other family, calling crisis lines, going to the nearest ED if SI/HI/SIB becomes overwhelming, worsens and/or becomes active.     Client did identify concern to be addressed today - reported that she continues to experience periodic anxiety related to fearing that she may \"lose control\" and start to have psychosis symptoms again. Specifically stated that this occurs more often when alone and even in the context of fearing closing her eyes to take a nap in the afternoon. Client denied any instances of this symptoms in the psat week, but does generally report this occurring 2-3 times a week. Completed module about dealing with negative feelings. Reviewed the common thought patterns and identified which was prevalent in the previously completed thought-feeling triangle. Client identified that she feels that she is experiencing panic attacks when she is \"overwhelmed\" - will continue to discuss and will discuss coping strategies at next visit.    Laura has reported that she has experienced multiple traumatic experiences in her past, and did not want to discuss these events in any detail.     Writer used relational and interpersonal approach to explore feelings, motivations, and behavior. Writer offered support, feedback, validation, and reinforced use of skills taught in IRT from modalities including cognitive behavioral therapy, psycho education, and skills training. Promoted understanding of their experiences of psychosis and how it impacts them in important areas of their life and in recovery goals. Reflected on client's strengths and resiliency factors and facilitated discussion on how these can assist in symptom management, recovery, and well-being.     Home practice identified as: use a " "cold pack the next time you have a flare up of anxiety about symptoms returning. Track your good days and day that you are overwhelmed and make note of any patterns you see.     13. Plan/Referrals:     Next visit scheduled for 12/12 at 2:00.    Billing for \"Interactive Complexity\"?    No    LORELEI Carrera    NAVIGATE Individual Resiliency Trainer  "

## 2023-12-12 ENCOUNTER — VIRTUAL VISIT (OUTPATIENT)
Dept: PSYCHIATRY | Facility: CLINIC | Age: 33
End: 2023-12-12
Payer: COMMERCIAL

## 2023-12-12 DIAGNOSIS — F29 PSYCHOSIS, UNSPECIFIED PSYCHOSIS TYPE (H): Primary | ICD-10-CM

## 2023-12-13 NOTE — PROGRESS NOTES
NAVIGJESUS Clinician Contact & Progress Note  For Individual Resiliency Training (IRT)  A Part of the [facility] First Episode of Psychosis Program    NAVIGATE Enrollee: Laura Webb (1990)     MRN: [id number]  Date:  12/12/23  Diagnosis:Psychosis, unspecified  Clinician: MILAN Individual Resiliency Trainer, LORELEI Carrera     1. Type of contact: (majority of time spent)  IRT Session via telehealth  Mode of communication: [employer] (HIPAA compliant, secure platform). Patient consented verbally to this mode of therapy today.  Reason for telehealth: COVID-19. This patient visit was converted to a telehealth visit to minimize exposure to COVID-19.    2. People present:   Writer  Client: Yes    3. Length of Actual Contact: Start Time: 2:04; End Time: 2:54     4. Location of contact:  Originating Location (patient location): family home, located in [place], [place]  Distant Location (provider location): Home office, located in [place], [place], using appropriate privacy considerations and procedures    5. Did the client complete the home practice option(s) from the previous session: Completed    6. Motivational Teaching Strategies:  Connect info and skills with personal goals  Promote hope and positive expectations    7. Educational Teaching Strategies:  Review of written material/education  Relate information to client's experience  Ask questions to check comprehension  Break down information into small chunks    8. CBT Teaching Strategies:  Reinforcement and shaping (positive feedback for steps towards goals, gains in knowledge & skills and follow-through on home assignments)    9. IRT Module(s) Addressed:  Module 3 - Coping with Stress    10. Techniques utilized:   [place] announced at beginning of session  Review of homework  Review of previous meeting  Present new material  Summarize progress made in current session  Other techniques (please specify):   -build rapport by discussing  "preferences, previous experiences of maribell, etc.  -Clarified patient's feelings and perspectives  -Elicited more details about current and recent circumstances  -Emotional support via active and reflective listening, responding to feelings etc.  -Identified patient's strengths/needs  -Identified goals for treatment plan  -Normalized and validated feelings and experiences  -Provided verbal overview of First Episode Program NAVIGATE services  -Provided positive feedback and encouragement  -Provided psychoeducation related to psychosis and related concerns.       11. Measures:    Mental Status Exam  Alertness: alert  and oriented  Behavior/Demeanor: cooperative, pleasant and calm  Speech: normal  Language: intact.   Mood: \"good\"  Thought Process/Associations: unremarkable  Thought Content:  Reports none;  Denies suicidal ideation and delusions  Perception:  Reports none;  Denies auditory hallucinations and visual hallucinations  Insight: adequate  Judgment: good and adequate for safety  Cognition: does  appear grossly intact; formal cognitive testing was not done    EMEKA-7  Not completed. Pt denied anxiety symptoms when asked.     PHQ-9  Not completed. Pt denied depressive symptoms when asked.     Wells Protocol Risk Identification  Pt denied SI when asked.     12. Assessment/Progress Note:     Writer and client met for IRT visit via zoom. Set agenda to check in; continue to expand on IRT modules and material. During check in, client reported having had a good week overall . Reported current symptoms to include: Occasionally feeling overwhelmed and experiencing acute anxiety.  Current stressors include: Pt has worries that her symptoms of psychosis may return, especially when she is alone. Reported previously that she worries about this maybe a few times a week.  In regards to medications, client reports: may have missed a dose or two of the cogentin and experienced a reoccurance of feeling uneasy, but this has abated " "with vigilance in taking the cogentin. Reports no issues regarding sleep. Substance use: NA. Assessed safety. Client denies SI, denies intent, and denies plan. HI was not present. SIB was not present.  Safety plan has been created in the past. Safety plan was not reviewed today and includes speaking with , speaking with brother or other family, calling crisis lines, going to the nearest ED if SI/HI/SIB becomes overwhelming, worsens and/or becomes active.     Client did identify concern to be addressed today - reported that she continues to experience periodic anxiety related to fearing that she may \"lose control\" and start to have psychosis symptoms again. Specifically stated that this occurs more often when alone and even in the context of fearing closing her eyes to take a nap in the afternoon. Began module about coping with stress, discussed stress in general terms and began talking through stress checklists. Will continue at next visit.     Laura has reported that she has experienced multiple traumatic experiences in her past, and did not want to discuss these events in any detail.     Writer used relational and interpersonal approach to explore feelings, motivations, and behavior. Writer offered support, feedback, validation, and reinforced use of skills taught in IRT from modalities including cognitive behavioral therapy, psycho education, and skills training. Promoted understanding of their experiences of psychosis and how it impacts them in important areas of their life and in recovery goals. Reflected on client's strengths and resiliency factors and facilitated discussion on how these can assist in symptom management, recovery, and well-being.     Home practice identified as: use a cold pack the next time you have a flare up of anxiety about symptoms returning. Track your good days and day that you are overwhelmed and make note of any patterns you see.     13. Plan/Referrals:     Next visit scheduled " "for one week from today.     Billing for \"Interactive Complexity\"?    No    LORELEI Carrera    NAVIGATE Individual Resiliency Trainer  "

## 2023-12-19 ENCOUNTER — VIRTUAL VISIT (OUTPATIENT)
Dept: PSYCHIATRY | Facility: CLINIC | Age: 33
End: 2023-12-19
Payer: COMMERCIAL

## 2023-12-19 DIAGNOSIS — F29 PSYCHOSIS, UNSPECIFIED PSYCHOSIS TYPE (H): Primary | ICD-10-CM

## 2023-12-19 NOTE — Clinical Note
Update after IRT visit: Laura discussed having occasionally felt a distressing amount of 'restlessness' that is difficult to manage and leads her to have to get up and move around. She reported talking with her  and feels that this is occurring since November. She plans to ask you about it and ask about either increasing cogentin or decreasing Invega. She also expressed hoping that she will not need to be on the Invega forever. I generally described our approach of wanting to have a year of stability before titration down to assess.   Laura also reports on-going period instances that very much meet criteria for panic attacks when she dwells on worry about having another episode of psychosis. She used the cold pack technique I described and reported some relief. Will continue to explore with her.   Thanks,  Park

## 2023-12-19 NOTE — PROGRESS NOTES
NAVIGJESUS Clinician Contact & Progress Note  For Individual Resiliency Training (IRT)  A Part of the [facility] First Episode of Psychosis Program    NAVIGATE Enrollee: Laura Webb (1990)     MRN: [id number]  Date:  12/19/23  Diagnosis:Psychosis, unspecified  Clinician: MILAN Individual Resiliency Trainer, LORELEI Carrera     1. Type of contact: (majority of time spent)  IRT Session via telehealth  Mode of communication: [employer] (HIPAA compliant, secure platform). Patient consented verbally to this mode of therapy today.  Reason for telehealth: COVID-19. This patient visit was converted to a telehealth visit to minimize exposure to COVID-19.    2. People present:   Writer  Client: Yes    3. Length of Actual Contact: Start Time: 2:04; End Time: 2:51     4. Location of contact:  Originating Location (patient location): family home, located in [place], [place]  Distant Location (provider location): Home office, located in [place], [place], using appropriate privacy considerations and procedures    5. Did the client complete the home practice option(s) from the previous session: Completed    6. Motivational Teaching Strategies:  Connect info and skills with personal goals  Promote hope and positive expectations    7. Educational Teaching Strategies:  Review of written material/education  Relate information to client's experience  Ask questions to check comprehension  Break down information into small chunks    8. CBT Teaching Strategies:  Reinforcement and shaping (positive feedback for steps towards goals, gains in knowledge & skills and follow-through on home assignments)    9. IRT Module(s) Addressed:  Module 3 - Coping with Stress    10. Techniques utilized:   [place] announced at beginning of session  Review of homework  Review of previous meeting  Present new material  Summarize progress made in current session  Other techniques (please specify):   -build rapport by discussing  "preferences, previous experiences of maribell, etc.  -Clarified patient's feelings and perspectives  -Elicited more details about current and recent circumstances  -Emotional support via active and reflective listening, responding to feelings etc.  -Identified patient's strengths/needs  -Identified goals for treatment plan  -Normalized and validated feelings and experiences  -Provided verbal overview of First Episode Program NAVIGATE services  -Provided positive feedback and encouragement  -Provided psychoeducation related to psychosis and related concerns.       11. Measures:    Mental Status Exam  Alertness: alert  and oriented  Behavior/Demeanor: cooperative, pleasant and calm  Speech: normal  Language: intact.   Mood: \"good\"  Thought Process/Associations: unremarkable  Thought Content:  Reports none;  Denies suicidal ideation and delusions  Perception:  Reports none;  Denies auditory hallucinations and visual hallucinations  Insight: adequate  Judgment: good and adequate for safety  Cognition: does  appear grossly intact; formal cognitive testing was not done    EMEKA-7  Not completed. Pt denied anxiety symptoms when asked.     PHQ-9  Not completed. Pt denied depressive symptoms when asked.     [facility]oke Protocol Risk Identification  Pt denied SI when asked.     12. Assessment/Progress Note:     Writer and client met for IRT visit via zoom. Set agenda to check in; continue to expand on IRT modules and material. During check in, client reported having had an okay week. Reported current symptoms to include: Occasionally feeling overwhelmed and experiencing acute anxiety.  Current stressors include: Pt has worries that her symptoms of psychosis may return, especially when she is alone. Reported previously that she worries about this maybe a few times a week.  In regards to medications, client reports:experienced a reoccurance of feeling uneasy and restless, needing to get up and move around. Client plans to discuss with " "Dr. Guzmán at next visit.  Reports no issues regarding sleep. Substance use: NA. Assessed safety. Client denies SI, denies intent, and denies plan. HI was not present. SIB was not present.  Safety plan has been created in the past. Safety plan was not reviewed today and includes speaking with , speaking with brother or other family, calling crisis lines, going to the nearest ED if SI/HI/SIB becomes overwhelming, worsens and/or becomes active.     Client did identify concern to be addressed today - reported that she continues to experience periodic anxiety related to fearing that she may \"lose control\" and start to have psychosis symptoms again. Specifically stated that this occurs more often when alone and even in the context of fearing closing her eyes to take a nap in the afternoon. Client reports having had a particularly bad day on Thursday of last week. Writer and client. Discussed this in more detail and identified symptoms that point towards an acute panic attack. Client reports having used the cold pack previously discuss and reports that it was helpful in grounding and calming her in the moment.     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 Writer and client checked in about finding things that are purely for her enjoyment - client agreed that this is a good goal.     Laura has reported that she has had multiple traumatic experiences in her past, and did not want to discuss these events in any detail.     Writer used relational and interpersonal approach to explore feelings, motivations, and behavior. Writer offered support, feedback, validation, and reinforced use of skills taught in IRT from modalities including cognitive behavioral therapy, psycho education, and skills training. Promoted understanding of their experiences of psychosis and how it impacts them in important areas " "of their life and in recovery goals. Reflected on client's strengths and resiliency factors and facilitated discussion on how these can assist in symptom management, recovery, and well-being.     Home practice identified as: use a cold pack the next time you have a flare up of anxiety about symptoms returning. Track your good days and day that you are overwhelmed and make note of any patterns you see.     13. Plan/Referrals:     Next visit scheduled for 1/2 at 2:00.     Billing for \"Interactive Complexity\"?    No    LORELEI Carrera    NAVIGATE Individual Resiliency Trainer  "

## 2023-12-22 ENCOUNTER — VIRTUAL VISIT (OUTPATIENT)
Dept: PSYCHIATRY | Facility: CLINIC | Age: 33
End: 2023-12-22
Payer: COMMERCIAL

## 2023-12-22 VITALS — BODY MASS INDEX: 22.15 KG/M2 | HEIGHT: 63 IN | WEIGHT: 125 LBS

## 2023-12-22 DIAGNOSIS — F43.10 PTSD (POST-TRAUMATIC STRESS DISORDER): ICD-10-CM

## 2023-12-22 DIAGNOSIS — F29 PSYCHOSIS, UNSPECIFIED PSYCHOSIS TYPE (H): Primary | ICD-10-CM

## 2023-12-22 RX ORDER — BENZTROPINE MESYLATE 0.5 MG/1
0.5 TABLET ORAL 2 TIMES DAILY
Qty: 60 TABLET | Refills: 2 | Status: SHIPPED | OUTPATIENT
Start: 2023-12-22 | End: 2024-04-05

## 2023-12-22 ASSESSMENT — PATIENT HEALTH QUESTIONNAIRE - PHQ9: SUM OF ALL RESPONSES TO PHQ QUESTIONS 1-9: 9

## 2023-12-22 ASSESSMENT — PAIN SCALES - GENERAL: PAINLEVEL: NO PAIN (0)

## 2023-12-22 NOTE — PROGRESS NOTES
"Virtual Visit Details    Type of service:  Video Visit   Video Start Time:  1040  Video End Time: 1102    Originating Location (pt. Location): Home    Distant Location (provider location):  On-site  Platform used for Video Visit: [employer]    [facility] Medication Management Progress Note  A Part of the [facility] First Episode of Psychosis Program    NAVIGATE Enrollee: Laura Webb (1990)     MRN: [id number]  Date:  23         Contributors to the Assessment     Chart Reviewed.   Interview completed with Laura Webb.  Collateral information obtained from Laura.         Chief Complaint      \"Okay\"         Interim History    Laura Webb is a 33 year old female who was last seen in MD clinic on 23  at which time no medication changes were made. The patient reports good treatment adherence.  History was provided by Laura who was a fair historian.  Since the last visit:  -Waking up around 7 feeling restless, feeling like she has to get up and move around. Not waking up with anxiety, no real correlation between dreams and waking up.  -Has not had this before. Has, however, been having more anxiety/worries about psychosis coming back. Even when not feeling like she has to pace, she is waking up in the morning with anxiety.  -some flashbacks, feeling on edge like something bad might happen. Not a lot of nightmares.  -Unsure what might be contributing to this, \"possible\" that it is the fact that she is now processing psychotic episode therapy, but denies any other changes/new stressors    PSYCH ROS:  Clinician Rating Form in COMPASS  1. Depressed Mood: Ratin  0 Not reported     1 Very mild occasionally feels sad or  down ; of questionable clinical significance   2 Mild occasionally feels moderately depressed or often feels sad or  down    3 Moderate occasionally feels very depressed or often feels moderately depressed   4 Moderately severe often feels very depressed   5 Severe feels very depressed most " of the time   6 Very severe constant extremely painful feelings of depression   U Unable to assess        2. Anxiety/Worry: Rating: 3  0 Not reported     1 Very mild occasionally feels a little anxious; of questionable clinical significance   2 Mild occasionally feels moderately anxious or often feels a little anxious or worried   3 Moderate occasionally feels very anxious or often feels moderately anxious   4 Moderately severe often feels very anxious or often feels moderately anxious   5 Severe feels very anxious or worried most of the time   6 Very severe patient is continually preoccupied with severe anxiety   U Unable to assess        3. Suicidal Ideation/Behavior: Ratin          0 Not reported     1 Very mild occasional thoughts of dying,  I d be better off dead  or  I wish I were dead    2 Mild frequents thoughts of dying or occasional thoughts of killing self, without plan or method   3 Moderate often thinks of suicide or has though of a specific method   4 Moderately severe has mentally rehearsed a specific method of suicide or has made a suicide attempt with questionable intent to die (e.r. takes aspirins and then tells family)   5 Severe has made preparations for a potentially lethal suicide attempt (e.g acquires a gun and bullets for an attempt)   6 Very severe has made a suicide attempt with an intent to die   U Unable to assess                      4. Elevated/Expansive Mood: Ratin  0 Not at all     1 Very mild questionable; more cheerful than most people in his/her circumstances but of only possible clinical significance   2 Mild brief elevated/expansive mood but only somewhat out of proportion to the circumstances   3 Moderate brief/occasional elevation of mood which is clearly out of proportion to the circumstances   4 Moderately severe sustained/frequent elevation of mood which is clearly out of proportion to the circumstances   5 Severe mood is euphoric most of the time   6 Very severe  sustained elevation;  everything is wonderful  almost all of the time   U Unable to assess        5. Hostility/Anger/Irritability/Aggressiveness: Ratin  0 Not at all     1 Very mild occasional irritability of doubtful clinical significance   2 Mild occasionally feels angry or mild or indirect expressions of anger, e.g. sarcasm, disrespect or hostile gestures   3 Moderate frequently feels angry, frequent irritability or occasional direct expression of anger, e.g. yelling at others   4 Moderately severe often feels very angry, often yells at others or occasionally threatens to harm others   5 Severe has acted on his anger by becoming physically abusive on one or two occasions or makes frequent threats to harm others or is very angry most of the time   6 Very severe has been physically aggressive and/or required intervention to prevent assaultiveness on several occasions; or any serious assaultive act   U Unable to assess        6. Impulsive Behavior: Ratin  0 Not at all     1 Very mild one instance of impulsive behavior which is of doubtful clinical significance   2 Mild occasional impulsive acts, e.g. making phone calls at odd hours   3 Moderate occasional impulsive acts with some potential negative consequence, e.g. leaving work abruptly; changing plans without thinking   4 Moderately severe impulsive acts with definite negative consequences, e.g. overspending on non-essentials; repeated reckless sexual behavior   5 Severe impulsive acts with direct negative consequences, e.g. spends entire income on nonessentials without regard for basic needs   6 Very severe impulsive behavior which is potentially life threatening, e.g. jumps from dangerous height (without suicidal intent) or criminal behavior, e.g. impulsive robbery   U Unable to assess        7. Suspiciousness: Ratin  0 Not present     1 Very mild Seems on guard. Reluctant to respond to some  personal  questions. Reports being overly self-conscious  in public   2 Mild Describes incidents in which others have harmed or wanted to harm him/her that sound plausible. Patient feels as if others are watching, laughing, or criticizing him/her in public, but this occurs only occasionally or rarely. Little or no preoccupation   3 Moderate Says others are talking about him/her maliciously, have negative intentions, or may harm him/her. Beyond the likelihood of plausibility, but not delusional. Incidents of suspected persecution occur occasionally (less than once per week) with some preoccupation   4 Moderately severe Same as 3, but incidents occur frequently such as more than once a week. Patient is moderately preoccupied with ideas of persecution OR patient reports persecutory delusions expressed with much doubt (e.g. partial delusion)   5 Severe Delusional -- speaks of Mafia plots, the FBI, or others poisoning his/her food, persecution by supernatural forces   6 Extremely severe Same as 5, but the beliefs are bizarre or more preoccupying. Patient tends to disclose or act on persecutory delusions.   U Unable to assess        8. Unusual Thought Content: Ratin  0 Not present     1 Very mild Ideas of reference (people may stare or may laugh at him), ideas of persecution (people may mistreat him). Unusual beliefs in psychic green, spirits, UFOs, or unrealistic beliefs in one's own abilities. Not strongly held. Some doubt   2 Mild Same as 1, but degree of reality distortion is more severe as indicated by highly unusual ideas or greater conviction. Content may be typical of delusions (even bizarre), but without full conviction. The delusion does not seem to have fully formed, but is considered as one possible explanation for an unusual experience   3 Moderate Delusion present but no preoccupation or functional impairment. May be an encapsulated delusion or a firmly endorsed absurd belief about past delusional circumstances   4 Moderately severe Full delusion(s) present  with some preoccupation OR some areas of functioning disrupted by delusional thinking   5 Severe Full delusion(s) present with much preoccupation OR many areas of functioning are disrupted by delusional thinking   6 Extremely severe Full delusions present with almost   U Unable to assess        9. Hallucinations: Ratin  0 Not present     1 Very mild While resting or going to sleep, sees visions, smells odors, or hears voices, sounds or whispers in the absence of external stimulation, but no impairment in functioning   2 Mild While in a clear state of consciousness, hears a voice calling the subject s name, experiences non-verbal auditory hallucinations (e.g., sounds or whispers), formless visual hallucinations, or has sensory experiences in the presence of a modality-relevant stimulus (e.g., visual illusions) infrequently (e.g., 1-2 times per week) and with no functional impairment   3 Moderate Occasional verbal, visual, gustatory, olfactory, or tactile hallucinations with no functional impairment OR non-verbal auditory hallucinations/visual illusions more than infrequently or with impairment   4 Moderately severe Experiences daily hallucinations OR some areas of functioning are disrupted by hallucinations   5 Severe Experiences verbal or visual hallucinations several times a day OR many areas of functioning are disrupted by these hallucinations   6 Extremely severe Persistent verbal or visual hallucinations throughout the day OR most areas of functioning are disrupted by these hallucinations   U Unable to assess        10. Conceptual Disorganization: Ratin  0 Not present     1 Very mild Peculiar use of words or rambling but speech is comprehensible   2 Mild Speech a bit hard to understand or make sense of due to tangentiality, circumstantiality, or sudden topic shifts   3 Moderate Speech difficult to understand due to tangentiality, circumstantiality, idiosyncratic speech, or topic shifts on many occasions  OR 1-2 instances of incoherent phrases   4 Moderately severe Speech difficult to understand due to circumstantiality, tangentiality, neologisms, blocking, or topic shifts most of the time OR 3-5 instances of incoherent phrases   5 Severe Speech is incomprehensible due to severe impairments most of the time. Many PSRS items cannot be rated by self-report alone   6 Extremely severe Speech is incomprehensible throughout interview   U Unable to assess        11. Avolition/Apathy: Ratin  0 Not at all     1 Very mild questionable decrease in time spent in goal-directed activities   2 Mild spends less time in goal-directed activities than is appropriate for situation and age   3 Moderate initiates activities at times but does not follow through   4 Moderately severe rarely initiates activity but will passively engage with encouragement   5 Severe almost never initiates activities; requires assistance to accomplish basic activities   6 Very severe does not initiate or persist in any goal-directed activity even with outside assistance   U Unable to assess        12. Asociality/Low Social Drive: Ratin  0 Not at all     1 Very mild questionable   2 Mild slow to initiate social interactions but usually responds to overtures by others   3 Moderate rarely initiates social interactions; sometimes responds to overtures by others   4 Moderately severe does not initiate but sometimes responds to overtures by others; little social interaction outside close family members   5 Severe never initiates and rarely encourages conversations or activities; avoids being with others unless prodded, may have contacts with family   6 Very severe avoids being with others (even family members) whenever possible, extreme social isolation   U Unable to assess        13. Adherence: Days: 0  The longest, continuous amount of time in days, since the last visit when the subject did not take medication      14. EPS Part I: Ratin  Rate Elbow  Rigidity for all subjects  0 Normal   1 Slight stiffness and resistance   2 Moderate stiffness and resistance   3 Marked rigidity with difficulty in passive movement   4 Extreme stiffness and rigidity with almost a frozen joint   U Unable to assess            EPS Part 2: Signs of EPS: 0  Are there are other signs of EPS (eg diminished arm swing, postural instability, cogwheeling, tremor, akinesia) present based upon patient report or exam?  0 No   1 Yes      15. Akathesia: Ratin  0 No restlessness reported or observed   1 Mild restlessness observed; e.g., occasional jiggling of the foot occurs when subject is seated   2 Moderate restlessness observed; e.g., on several occasions, jiggles foot, crosses and uncrosses legs or twists a part of the body   3 Restlessness is frequently observed; e.g., the foot or legs moving most of the time   4 Restlessness persistently observed; e.g., subject cannot sit still, may get up and walk   U Unable to assess      16. Dyskinetic Movement Ratings: Ratin  0 None   1 Minimal, may be extreme normal   2 Mild   3 Moderate   4 Severe   U Unable to assess      SIDE EFFECT ASSESSMENT:  Clinician Rating Form in COMPASS - Side Effect Assessment  Address side effects reported by the patient and rate using this scale  0 Not present   1 Minimal, may be extreme normal   2 Mild   3 Moderate   4 Severe   U Unable to assess   P Present, but not related      Feeling dizzy or faint: 0  Blurred vision: 0  Dry mouth: 0  Too much saliva/droolin  Nausea:  0  Constipation: 0  Increased appetite: 0  Weight gain: 0  Weight loss: 0  Feeling tired/fatigue: 0  Daytime sedation: 0  Hypersomnia: 0  Insomnia: 0  low libido: 0  Other problems with sex: 0  Breast enlargement or discharge:  0  Irregular Menstruation or amenorrhea: 0  Other (please list and rate): 0     RECENT SUBSTANCE USE:  Clinician Rating Form in COMPASS - Substance Use Assessment  Alcohol Use Severity: Ratin  0 none   1 use  without impairment: drinks but no immediate social or medical impairment   2 use with impairment: e.g. becomes grossly intoxicated; alcohol use or withdrawal compromises school, work or social functioning; alcohol use or withdrawal exacerbates symptoms (e.g. gets depressed when drinking)      Marijuana Use Severity: Ratin  0 none   1 occasional use without impairment: e.g. uses marijuana a few days a month and has no immediate social or medical impairment   2 frequent use without impairment: e.g. uses marijuana several or more days a week but has no immediate social or medical impairment   3 use with impairment: e.g. becomes grossly intoxicated; marijuana use compromises school, work or social functioning; marijuana use exacerbates symptoms (e.g. gets paranoid when using)      Other Drug Use Severity: Ratin  0 none   1 occasional use without impairment: e.g. uses drug(s) a few days a month and has no immediate social or medical impairment   2 frequent use without impairment: e.g. uses drug(s) several or more days a week but has no immediate social or medical impairment   3 use with impairment: e.g. becomes grossly intoxicated; drug use compromises school, work or social functioning; drug use exacerbates symptoms (e.g. gets paranoid when using)      RECENT SOCIAL HISTORY:  SEE HPI    Medical ROS:  none         First Episode of Psychosis History      DUP (duration untreated psychosis):  several days to eight months  Route to initial care: hospitalization  Medication adherence overall:  See above, Clinician Rating Form in COMPASS Item 13  General frequency of visits:  monthly  Participation in groups:  No    Reviewed for completion of First Episode work-up:  Yes  First episode workup:  Not Done (if completed, see LABS for results)  MATRICS Consensus Cognitive Battery:  Not Done (if completed, see LABS for results)         Medical/Surgical History     Blood-group specific substance    There is no problem list on  "file for this patient.           Medications     Current Outpatient Medications   Medication Sig Dispense Refill    benztropine (COGENTIN) 0.5 MG tablet Take 1 tablet (0.5 mg) by mouth 2 times daily 60 tablet 2    paliperidone (INVEGA SUSTENNA) 156 MG/ML TERRELL injection Inject 1 mL (156 mg) into the muscle every 28 days 1 mL 11             Vitals     Ht 1.6 m (5' 3\")   Wt 56.7 kg (125 lb)   BMI 22.14 kg/m            Mental Status Exam     Alertness: alert  and oriented  Appearance: well groomed  Behavior/Demeanor: pleasant and calm, with fair  eye contact   Speech: normal; more talkative than at previous appointments  Language: no obvious problem  Psychomotor: normal or unremarkable  Mood:  OK  Affect: blunted; was congruent to mood; was congruent to content  Thought Process/Associations: unremarkable  Thought Content:  Reports none;  Denies suicidal and violent ideation and delusions  Perception:  Reports none;  Denies auditory hallucinations and visual hallucinations  Insight:  difficult to assess as her memory of certain events is limited  Judgment: good, adequate for safety  Cognition: does  appear grossly intact; formal cognitive testing was not done         Labs and Data     RATING SCALES: AIMS due    PHQ9 TODAY =       9/22/2023    11:15 AM 11/27/2023    12:14 PM 12/22/2023    10:18 AM   PHQ-9 SCORE   PHQ-9 Total Score 1 0 9       ANTIPSYCHOTIC LABS ROUTINE    [glu, A1C, lipids (focus LDL), liver enzymes, WBC, ANEU, Hgb, plts]   q12 mo  No lab results found.  No lab results found.  No lab results found.  No lab results found.    Narrative & Impression   EXAM: MR BRAIN W/O and W CONTRAST  LOCATION: [facility]  DATE: 8/22/2023     INDICATION:  Psychosis, unspecified psychosis type (H)  COMPARISON: None.  CONTRAST: 7ml Gadavist  TECHNIQUE: Routine multiplanar multisequence head MRI without and with intravenous contrast.     FINDINGS:  INTRACRANIAL CONTENTS: No acute or subacute " infarct. No mass, acute hemorrhage, or extra-axial fluid collections. Normal brain parenchymal signal. Normal ventricles and sulci. Normal position of the cerebellar tonsils. No pathologic contrast enhancement.   Posterior fossa structures including cerebellar hemispheres, fourth ventricle and CP angle cisterns are clear. Nasopharynx is clear. Region of the sella and suprasellar cistern are clear.     SELLA: No abnormality accounting for technique.     OSSEOUS STRUCTURES/SOFT TISSUES: Normal marrow signal. The major intracranial vascular flow voids are maintained.      ORBITS: No abnormality accounting for technique.      SINUSES/MASTOIDS: No paranasal sinus mucosal disease. No middle ear or mastoid effusion.                                                                    IMPRESSION:  1.  Normal head MRI.            Psychiatric Diagnoses     Schizophreniform disorder, with positive prognostic features  H/o catatonia         Assessment     From intake, 8/4/23: Laura Webb is a 32 year old  Black or  Not  or  female who presented for a comprehensive assessment of psychiatric symptoms by the First Episode of Psychosis Navigate Program. Diagnostically challenging given single-episode of mental illness. Differential is broad including psychosis due to medical condition (seizure), bipolar disorder, MDD single episode with psychotic features. Substances do not appear to be a contributing factor. Schizophrenia also considered, though unlikely given rapid-onset of her symptoms and high cognitive and psychosocial functioning following her first episode / hospitalization. Her chart review and interview today present as most consistent with schizophreniform disorder. Prognosis is fair-to-good. Rapid onset of symptoms is positive predictive factor for resolution of symptoms / return to baseline.   We discussed this assessment with her today. We discussed our recommendations: to  continue current medications, complete her medical / neurological workup to rule-out underlying epileptic and autoimmune etiologies. Recommend continuing long-acting-injectable for now, ideally for 1 year following resolution of her psychotic symptoms. She expressed understanding of this plan.      TODAY Laura denies any symptoms of psychosis, low mood, or eduardo, but does endorse early morning awakening, waking up with anxiety, physical restlessness, and one instance of panic attack reported to IRT clinician. She also describes increase in worries about recurrence of psychosis, feeling on edge/like something bad might happen, and flashbacks to traumatic memories. Denies any change in EPSE that is currently being treated with Cogentin (muscle stiffness/back pain).  Overall, she actually appeared more communicative than during my previous visits with her, better able to describe her symptoms and general mental health. This is likely related to ongoing engagement in IRT.    Due to context of what appears to be PTSD symptoms, potentially in the setting of undisclosed new stressors vs processing in therapy, I do not think this is likely to represent a late-onset akathisia from Invega, but did offer her a selective serotonin reuptake inhibitor as an evidence-based treatment for PTSD and anxiety disorders. She was not interested at this time but will keep it in mind. We also discussed symptomatic treatment options (sympatholytics like prazosin, clonidine, propranolol) that could be future options.    PSYCHOTROPIC DRUG INTERACTIONS:   None.  MANAGEMENT:  N/A         Plan   1) Medications   - continue Invega Sustenna 156 mg IM q28 days (gets this at [facility])  - continue benztropine 0.5 mg BID      2) Therapy-  weekly IRT      3) Next Due   Labs- remaining first episode labs ordered 08/10/23, still pending  EKG- last completed 4/20/23, repeat if increasing or adding Qtc- prolonging medications   Rating scales- AIMS: annually      4) Referrals  Medical concerns- seizure history, neurology referral for EEG; she expresses hesitance about this       5) RTC: q 5weeks     6) Crisis Numbers:   Provided routinely in AVS     After hours:  [phone]    TREATMENT RISK STATEMENT:  The risks, benefits, alternatives and potential adverse effects have been discussed and are understood by the pt. The pt understands the risks of using street drugs or alcohol. There are no medical contraindications, the pt agrees to treatment with the ability to do so. The pt knows to call the clinic for any problems or to access emergency care if needed.  Medical and substance use concerns are documented above.  Psychotropic drug interaction check was done, including changes made today.      PROVIDER:  Yaneth Guzmán MD

## 2023-12-22 NOTE — NURSING NOTE
"Unable to complete webtide[facility]PASS QRNS due to time restraint.       Patient scored 9 on PHQ-9.      Depression Response    Patient completed the PHQ-9 assessment for depression and scored >9? Yes  Question 9 on the PHQ-9 was positive for suicidality? No  Does patient have current mental health provider? Yes    Is this a virtual visit? Yes   Does patient have suicidal ideation (positive question 9)? No - offer to place Mental Health Referral.  Patient declined referral/not needed    I personally notified the following: visit provider put PHQ-9 scored in the MSG Column for provider awareness.          Is the patient currently in the state of [place]? YES    Visit mode:VIDEO    If the visit is dropped, the patient can be reconnected by: VIDEO VISIT: Text to cell phone:   Telephone Information:   Mobile [phone]       Will anyone else be joining the visit? NO  (If patient encounters technical issues they should call [phone])    How would you like to obtain your AVS? MyChart    Are changes needed to the allergy or medication list? No    Reason for visit: RECHECK (Patient said, \" medication been feeling restless.\")      Ana Navarrete VVF     "

## 2023-12-27 ENCOUNTER — TELEPHONE (OUTPATIENT)
Dept: PSYCHIATRY | Facility: CLINIC | Age: 33
End: 2023-12-27

## 2023-12-31 ENCOUNTER — HEALTH MAINTENANCE LETTER (OUTPATIENT)
Age: 33
End: 2023-12-31

## 2024-01-02 ENCOUNTER — VIRTUAL VISIT (OUTPATIENT)
Dept: PSYCHIATRY | Facility: CLINIC | Age: 34
End: 2024-01-02
Payer: COMMERCIAL

## 2024-01-02 DIAGNOSIS — F29 PSYCHOSIS, UNSPECIFIED PSYCHOSIS TYPE (H): Primary | ICD-10-CM

## 2024-01-03 NOTE — PROGRESS NOTES
NAVIGJESUS Clinician Contact & Progress Note  For Individual Resiliency Training (IRT)  A Part of the [facility] First Episode of Psychosis Program    NAVIGATE Enrollee: Laura Webb (1990)     MRN: [id number]  Date:  1/02/24  Diagnosis:Psychosis, unspecified  Clinician: MILAN Individual Resiliency Trainer, LORELEI Carrera     1. Type of contact: (majority of time spent)  IRT Session via telehealth  Mode of communication: [employer] Well (HIPAA compliant, secure platform). Patient consented verbally to this mode of therapy today.  Reason for telehealth: COVID-19. This patient visit was converted to a telehealth visit to minimize exposure to COVID-19.    2. People present:   Writer  Client: Yes    3. Length of Actual Contact: Start Time: 2:02; End Time: 2:53     4. Location of contact:  Originating Location (patient location): family home, located in [place], [place]  Distant Location (provider location): Home office, located in [place], [place], using appropriate privacy considerations and procedures    5. Did the client complete the home practice option(s) from the previous session: Completed    6. Motivational Teaching Strategies:  Connect info and skills with personal goals  Promote hope and positive expectations    7. Educational Teaching Strategies:  Review of written material/education  Relate information to client's experience  Ask questions to check comprehension  Break down information into small chunks    8. CBT Teaching Strategies:  Reinforcement and shaping (positive feedback for steps towards goals, gains in knowledge & skills and follow-through on home assignments)    9. IRT Module(s) Addressed:  Module 3 - Coping with Stress    10. Techniques utilized:   [place] announced at beginning of session  Review of homework  Review of previous meeting  Present new material  Summarize progress made in current session  Other techniques (please specify):   -build rapport by discussing  "preferences, previous experiences of maribell, etc.  -Clarified patient's feelings and perspectives  -Elicited more details about current and recent circumstances  -Emotional support via active and reflective listening, responding to feelings etc.  -Identified patient's strengths/needs  -Identified goals for treatment plan  -Normalized and validated feelings and experiences  -Provided verbal overview of First Episode Program NAVIGATE services  -Provided positive feedback and encouragement  -Provided psychoeducation related to psychosis and related concerns.       11. Measures:    Mental Status Exam  Alertness: alert  and oriented  Behavior/Demeanor: cooperative, pleasant and calm  Speech: normal  Language: intact.   Mood: \"good\"  Thought Process/Associations: unremarkable  Thought Content:  Reports none;  Denies suicidal ideation and delusions  Perception:  Reports none;  Denies auditory hallucinations and visual hallucinations  Insight: adequate  Judgment: good and adequate for safety  Cognition: does  appear grossly intact; formal cognitive testing was not done    EMEKA-7  Not completed. Pt denied anxiety symptoms when asked.     PHQ-9  Not completed. Pt denied depressive symptoms when asked.     [place] Protocol Risk Identification  Pt denied SI when asked.     12. Assessment/Progress Note:     Writer and client met for IRT visit via zoom. Set agenda to check in; continue to expand on IRT modules and material. During check in, client reported having had an okay week. Reported current symptoms to include: occasional lower mood and Occasionally feeling overwhelmed and experiencing acute anxiety.  Current stressors include: Pt has worries that her symptoms of psychosis may return, especially when she is alone. Reported previously that she worries about this maybe a few times a week.  In regards to medications, client reports:experienced a reoccurance of feeling uneasy and restless, reports speaking with psychiatrist about " "this and and determining it is not a side effect. Reports no issues regarding sleep. Substance use: NA. Assessed safety. Client denies SI, denies intent, and denies plan. HI was not present. SIB was not present.  Safety plan has been created in the past. Safety plan was not reviewed today and includes speaking with , speaking with brother or other family, calling crisis lines, going to the nearest ED if SI/HI/SIB becomes overwhelming, worsens and/or becomes active.Client did not identify concern to be addressed today.     Writer and client discussed possible panic attacks in more detail and identified an instance of an acute panic attack that was \"less intense\" in nature. Client reports having used the cold pack previously discuss and reports that it was helpful in grounding and calming her in the moment. Regarding the less intense panic attack discussed today, client described using deep breathing and \"coaching myself back into the present moment\", which client reports was successful. Will continue to monitor and discuss.    Client and writer continued to discuss coping with stress, particularly began identifying strategies that she already uses and exploring strategies that she would like to add to her tool kit.Client determined she would like to spend more time doing meaning activities and strengthen her support system. Will continue to discuss.      Laura has reported that she has had multiple traumatic experiences in her past, and did not want to discuss these events in any detail.     Writer used relational and interpersonal approach to explore feelings, motivations, and behavior. Writer offered support, feedback, validation, and reinforced use of skills taught in IRT from modalities including cognitive behavioral therapy, psycho education, and skills training. Promoted understanding of their experiences of psychosis and how it impacts them in important areas of their life and in recovery goals. " "Reflected on client's strengths and resiliency factors and facilitated discussion on how these can assist in symptom management, recovery, and well-being.     Home practice identified as: use a cold pack the next time you have a flare up of anxiety about symptoms returning. Track your good days and day that you are overwhelmed and make note of any patterns you see.     13. Plan/Referrals:     Next visit scheduled for one week from today.     Billing for \"Interactive Complexity\"?    No    LORELEI Carrera    NAVIGATE Individual Resiliency Trainer  "

## 2024-01-09 ENCOUNTER — VIRTUAL VISIT (OUTPATIENT)
Dept: PSYCHIATRY | Facility: CLINIC | Age: 34
End: 2024-01-09
Payer: COMMERCIAL

## 2024-01-09 DIAGNOSIS — F29 PSYCHOSIS, UNSPECIFIED PSYCHOSIS TYPE (H): Primary | ICD-10-CM

## 2024-01-10 NOTE — PROGRESS NOTES
NAVIGJESUS Clinician Contact & Progress Note  For Individual Resiliency Training (IRT)  A Part of the [facility] First Episode of Psychosis Program    NAVIGATE Enrollee: Laura Webb (1990)     MRN: [id number]  Date:  1/09/24  Diagnosis:Psychosis, unspecified  Clinician: MILAN Individual Resiliency Trainer, LORELEI Carrera     1. Type of contact: (majority of time spent)  IRT Session via telehealth  Mode of communication: [employer] Well (HIPAA compliant, secure platform). Patient consented verbally to this mode of therapy today.  Reason for telehealth: COVID-19. This patient visit was converted to a telehealth visit to minimize exposure to COVID-19.    2. People present:   Writer  Client: Yes    3. Length of Actual Contact: Start Time: 2:04; End Time: 2:56     4. Location of contact:  Originating Location (patient location): family home, located in [place], [place]  Distant Location (provider location): Home office, located in [place], [place], using appropriate privacy considerations and procedures    5. Did the client complete the home practice option(s) from the previous session: Completed    6. Motivational Teaching Strategies:  Connect info and skills with personal goals  Promote hope and positive expectations    7. Educational Teaching Strategies:  Review of written material/education  Relate information to client's experience  Ask questions to check comprehension  Break down information into small chunks    8. CBT Teaching Strategies:  Reinforcement and shaping (positive feedback for steps towards goals, gains in knowledge & skills and follow-through on home assignments)    9. IRT Module(s) Addressed:  Module 3 - Coping with Stress    10. Techniques utilized:   [place] announced at beginning of session  Review of homework  Review of previous meeting  Present new material  Summarize progress made in current session  Other techniques (please specify):   -build rapport by discussing  "preferences, previous experiences of maribell, etc.  -Clarified patient's feelings and perspectives  -Elicited more details about current and recent circumstances  -Emotional support via active and reflective listening, responding to feelings etc.  -Identified patient's strengths/needs  -Identified goals for treatment plan  -Normalized and validated feelings and experiences  -Provided verbal overview of First Episode Program NAVIGATE services  -Provided positive feedback and encouragement  -Provided psychoeducation related to psychosis and related concerns.       11. Measures:    Mental Status Exam  Alertness: alert  and oriented  Behavior/Demeanor: cooperative, pleasant and calm  Speech: normal  Language: intact.   Mood: \"good\"  Thought Process/Associations: unremarkable  Thought Content:  Reports none;  Denies suicidal ideation and delusions  Perception:  Reports none;  Denies auditory hallucinations and visual hallucinations  Insight: adequate  Judgment: good and adequate for safety  Cognition: does  appear grossly intact; formal cognitive testing was not done    EMEKA-7  Not completed. Pt denied anxiety symptoms when asked.     PHQ-9  Not completed. Pt denied depressive symptoms when asked.     [place] Protocol Risk Identification  Pt denied SI when asked.     12. Assessment/Progress Note:     Writer and client met for IRT visit via zoom. Set agenda to check in; continue to expand on IRT modules and material. During check in, client reported having had an okay week. Reported current symptoms to include: occasional lower mood and Occasionally feeling overwhelmed and experiencing acute anxiety.  Current stressors include: Pt has worries that her symptoms of psychosis may return, especially when she is alone. Reported previously that she worries about this maybe a few times a week.  In regards to medications, client reports:experienced a reoccurance of feeling uneasy and restless, reports speaking with psychiatrist about " this and and determining it is not a side effect. Reports no issues regarding sleep. Substance use: NA. Assessed safety. Client denies SI, denies intent, and denies plan. HI was not present. SIB was not present.  Safety plan has been created in the past. Safety plan was not reviewed today and includes speaking with , speaking with brother or other family, calling crisis lines, going to the nearest ED if SI/HI/SIB becomes overwhelming, worsens and/or becomes active.Client did not identify concern to be addressed today.     Client denies any panic attack symptoms over the past week. Will continue to monitor and discuss. Client and writer continued to discuss coping with stress, particularly began identifying strategies that she already uses and exploring strategies that she would like to add to her tool kit.Client determined she would like to spend more time doing meaningful activities and strengthen her support system. Additionally discussed the value ot talking about or writing about feelings, and the use of mindfulness. Email sent (see below). Will continue to discuss.      Laura has reported that she has had multiple traumatic experiences in her past, and did not want to discuss these events in any detail.     Writer used relational and interpersonal approach to explore feelings, motivations, and behavior. Writer offered support, feedback, validation, and reinforced use of skills taught in IRT from modalities including cognitive behavioral therapy, psycho education, and skills training. Promoted understanding of their experiences of psychosis and how it impacts them in important areas of their life and in recovery goals. Reflected on client's strengths and resiliency factors and facilitated discussion on how these can assist in symptom management, recovery, and well-being.     Home practice identified as: use a cold pack the next time you have a flare up of anxiety about symptoms returning. Track your good  "days and day that you are overwhelmed and make note of any patterns you see.     Email sent:  Eduardo Sanchez  We starting discussing the value of talking about or writing about your feelings, which led us to discussing how to feel your feelings. I mentioned some links I have to further reading materials - please see below:  Links about feeling your feelings:    The feelings wheel https://feelingswhSensys Networks.com/     A feelings wheel with the addition of sensations associated with emotions https://QuotaDeck/emotion-sensation-feeling-wheel/     Article about mapping bodily emotions (with a great visual of emotions) https://www.pnas.org/doi/10.1073/pnas.7230350320     Infographic on how to feel your feelings https://www.thefSupplyFrame.org/how-to-feel-your-feelings-a-thing-that-sounds-obvious-but-totally-isnt/     Some brief articles about how/why to feel your feelings   o https://www.Oculeve.com.au/2021/11/how-to-feel-your-feelings-and-why-you-should/   o https://www.Strobe.Gamma Enterprise Technologies/blog/2021/2/24/how-to-feel-your-feelings-allowing-yourself-to-feel-fully   o https://www.psychologyLANDBAY.com/us/blog/rakultvbnw-urzgmocejp-yfspjd/202010/the-key-skill-vd-sxcuqb-oixyo-how-feel-your-feelings     I hope you have a great week  Warmest regards,   KVNG Carrera, LORELEI     13. Plan/Referrals:     Next visit scheduled for one week from today.     Billing for \"Interactive Complexity\"?    No    LORELEI Carrera    NAVIGATE Individual Resiliency Trainer  "

## 2024-01-16 ENCOUNTER — VIRTUAL VISIT (OUTPATIENT)
Dept: PSYCHIATRY | Facility: CLINIC | Age: 34
End: 2024-01-16
Payer: COMMERCIAL

## 2024-01-16 DIAGNOSIS — F29 PSYCHOSIS, UNSPECIFIED PSYCHOSIS TYPE (H): Primary | ICD-10-CM

## 2024-01-17 NOTE — PROGRESS NOTES
NAVIGJESUS Clinician Contact & Progress Note  For Individual Resiliency Training (IRT)  A Part of the [facility] First Episode of Psychosis Program    NAVIGATE Enrollee: Laura Webb (1990)     MRN: [id number]  Date:  1/16/24  Diagnosis:Psychosis, unspecified  Clinician: MILAN Individual Resiliency Trainer, LORELEI Carrera     1. Type of contact: (majority of time spent)  IRT Session via telehealth  Mode of communication: [employer] Well (HIPAA compliant, secure platform). Patient consented verbally to this mode of therapy today.  Reason for telehealth: COVID-19. This patient visit was converted to a telehealth visit to minimize exposure to COVID-19.    2. People present:   Writer  Client: Yes    3. Length of Actual Contact: Start Time: 2:00; End Time: 2:50     4. Location of contact:  Originating Location (patient location): family home, located in [place], [place]  Distant Location (provider location): Home office, located in [place], [place], using appropriate privacy considerations and procedures    5. Did the client complete the home practice option(s) from the previous session: Completed    6. Motivational Teaching Strategies:  Connect info and skills with personal goals  Promote hope and positive expectations    7. Educational Teaching Strategies:  Review of written material/education  Relate information to client's experience  Ask questions to check comprehension  Break down information into small chunks    8. CBT Teaching Strategies:  Reinforcement and shaping (positive feedback for steps towards goals, gains in knowledge & skills and follow-through on home assignments)    9. IRT Module(s) Addressed:  Module 3 - Coping with Stress    10. Techniques utilized:   [place] announced at beginning of session  Review of homework  Review of previous meeting  Present new material  Summarize progress made in current session  Other techniques (please specify):   -build rapport by discussing  "preferences, previous experiences of maribell, etc.  -Clarified patient's feelings and perspectives  -Elicited more details about current and recent circumstances  -Emotional support via active and reflective listening, responding to feelings etc.  -Identified patient's strengths/needs  -Identified goals for treatment plan  -Normalized and validated feelings and experiences  -Provided verbal overview of First Episode Program NAVIGATE services  -Provided positive feedback and encouragement  -Provided psychoeducation related to psychosis and related concerns.       11. Measures:    Mental Status Exam  Alertness: alert  and oriented  Behavior/Demeanor: cooperative, pleasant and calm  Speech: normal  Language: intact.   Mood: \"good\"  Thought Process/Associations: unremarkable  Thought Content:  Reports none;  Denies suicidal ideation and delusions  Perception:  Reports none;  Denies auditory hallucinations and visual hallucinations  Insight: adequate  Judgment: good and adequate for safety  Cognition: does  appear grossly intact; formal cognitive testing was not done    EMEKA-7  Not completed. Pt denied anxiety symptoms when asked.     PHQ-9  Not completed. Pt denied depressive symptoms when asked.     [place] Protocol Risk Identification  Pt denied SI when asked.     12. Assessment/Progress Note:     Writer and client met for IRT visit via zoom. Set agenda to check in; continue to expand on IRT modules and material. During check in, client reported having had an okay week. Reported current symptoms to include: occasional lower mood and occasionally feeling overwhelmed and experiencing acute anxiety.  Current stressors include: Pt has worries that her symptoms of psychosis may return, especially when she is alone and often in the mornings. Reported previously that she worries about this maybe a few times a week.  In regards to medications, client reports:experienced a reoccurance of feeling uneasy and restless, reports " "speaking with psychiatrist about this and and determining it is not a side effect. Reports no issues regarding sleep. Substance use: NA. Assessed safety. Client denies SI, denies intent, and denies plan. HI was not present. SIB was not present.  Safety plan has been created in the past. Safety plan was not reviewed today and includes speaking with , speaking with brother or other family, calling crisis lines, going to the nearest ED if SI/HI/SIB becomes overwhelming, worsens and/or becomes active.Client did not identify concern to be addressed today.     Client reported \"a couple of days\" wherein she experienced a less intense type of overwhelm, feeling a number panic attack symptoms, over the past week. Laura discussed focusing on her routine of prayer, showering, reading her bible, taking her medications and eating breakfast - noted that this routine and her focus on the present is what is helpful for her to manage the symptoms of panic.  Will continue to monitor and discuss. Client and writer continued to discuss coping with stress, particularly began identifying strategies that she already uses and exploring strategies that she would like to add to her tool kit. Client previously determined she would like to spend more time doing meaningful activities and strengthen her support system. Today client reported no actions towards these goals, discussed how to incorporate action in these hill.  Additionally discussed the value ot talking about or writing about feelings, and the use of mindfulness. Client and writer viewed and discussed feelings wheels and the somatic aspects of feeling your feelings. Email previously sent (see below). Client discussed aspects of her hospitalization and the experience of memories coming back to her of her episode, but also of other difficult times in her life. Will continue to discuss.      Laura has reported that she has had multiple traumatic experiences in her past, and " did not want to discuss these events in any detail.     Writer used relational and interpersonal approach to explore feelings, motivations, and behavior. Writer offered support, feedback, validation, and reinforced use of skills taught in IRT from modalities including cognitive behavioral therapy, psycho education, and skills training. Promoted understanding of their experiences of psychosis and how it impacts them in important areas of their life and in recovery goals. Reflected on client's strengths and resiliency factors and facilitated discussion on how these can assist in symptom management, recovery, and well-being.     Home practice identified as: use a cold pack the next time you have a flare up of anxiety about symptoms returning. Track your good days and day that you are overwhelmed and make note of any patterns you see.     Emails sent:    Aurelio Sanchez,   I know we discussed breathing techniques  before, here is the PDF I like to share of some mindful and grounding techniques that I think are pretty easy to use, should you be interested. I look forward to seeing you next Tuesday.   Warmest regards,   Park Rush, MSW, LICSW     Eduardo Sanchez  We starting discussing the value of talking about or writing about your feelings, which led us to discussing how to feel your feelings. I mentioned some links I have to further reading materials - please see below:  Links about feeling your feelings:    The feelings wheel https://"[facility]com/     A feelings wheel with the addition of sensations associated with emotions https://Motor2/emotion-sensation-feeling-wheel/     Article about mapping bodily emotions (with a great visual of emotions) https://www.pnas.org/doi/10.1073/pnas.7498995824     Infographic on how to feel your feelings https://www.thefyi.org/how-to-feel-your-feelings-a-thing-that-sounds-obvious-but-totally-isnt/     Some brief articles about how/why to feel your feelings  "  o https://www.Accipiter Systems.com.au/2021/11/how-to-feel-your-feelings-and-why-you-should/   o https://www.Skybox Security/blog/2021/2/24/how-to-feel-your-feelings-allowing-yourself-to-feel-fully   o https://www.psychologyunrival.Planitax/us/blog/smfejgdavk-ditojchxvy-qycrot/202010/the-key-skill-mo-svlrdz-ciwnj-how-feel-your-feelings     I hope you have a great week  Warmest regards,   KNVG Carrera, LORELEI     13. Plan/Referrals:     Next visit scheduled for one week from today.     Billing for \"Interactive Complexity\"?    No    LORELEI Carrera    NAVIGATE Individual Resiliency Trainer  "

## 2024-01-18 ENCOUNTER — VIRTUAL VISIT (OUTPATIENT)
Dept: PSYCHIATRY | Facility: CLINIC | Age: 34
End: 2024-01-18
Payer: COMMERCIAL

## 2024-01-18 DIAGNOSIS — F29 PSYCHOSIS, UNSPECIFIED PSYCHOSIS TYPE (H): Primary | ICD-10-CM

## 2024-01-18 NOTE — PROGRESS NOTES
NAVIGATE/STRENGTHS Virtual Visit Progress Note  For Supported Employment & Education    NAVIGATE Enrollee: Laura Webb (1990)     MRN: [id number]  Date of Visit: 1/18/24  Contacted: TAYLOR  Appointment Length: 10 min    Discussed:   Writer met with Laura Webb for virtual visit check-in. Meeting agenda included been applying for work in home . Had a degree in public health. Would love to work from home. Open to others thing but would love to stay in public health. Would like to go back to school eventually    Follow-up: Sent job search     Randi Acuña  NAVIGATE/STRENGTHS  Supported Employment & Education

## 2024-01-23 ENCOUNTER — VIRTUAL VISIT (OUTPATIENT)
Dept: PSYCHIATRY | Facility: CLINIC | Age: 34
End: 2024-01-23
Payer: COMMERCIAL

## 2024-01-23 DIAGNOSIS — F29 PSYCHOSIS, UNSPECIFIED PSYCHOSIS TYPE (H): Primary | ICD-10-CM

## 2024-01-24 NOTE — PROGRESS NOTES
NAVIGJESUS Clinician Contact & Progress Note  For Individual Resiliency Training (IRT)  A Part of the [facility] First Episode of Psychosis Program    NAVIGATE Enrollee: Laura Webb (1990)     MRN: [id number]  Date:  1/23/24  Diagnosis:Psychosis, unspecified  Clinician: MILAN Individual Resiliency Trainer, LORELEI Carrera     1. Type of contact: (majority of time spent)  IRT Session via telehealth  Mode of communication: [employer] (HIPAA compliant, secure platform). Patient consented verbally to this mode of therapy today.  Reason for telehealth: COVID-19. This patient visit was converted to a telehealth visit to minimize exposure to COVID-19.    2. People present:   Writer  Client: Yes    3. Length of Actual Contact: Start Time: 2:02; End Time: 2:57     4. Location of contact:  Originating Location (patient location): family home, located in [place], [place]  Distant Location (provider location): Home office, located in [place], [place], using appropriate privacy considerations and procedures    5. Did the client complete the home practice option(s) from the previous session: Completed    6. Motivational Teaching Strategies:  Connect info and skills with personal goals  Promote hope and positive expectations    7. Educational Teaching Strategies:  Review of written material/education  Relate information to client's experience  Ask questions to check comprehension  Break down information into small chunks    8. CBT Teaching Strategies:  Reinforcement and shaping (positive feedback for steps towards goals, gains in knowledge & skills and follow-through on home assignments)    9. IRT Module(s) Addressed:  Module 3 - Coping with Stress    10. Techniques utilized:   [place] announced at beginning of session  Review of homework  Review of previous meeting  Present new material  Summarize progress made in current session  Other techniques (please specify):   -build rapport by discussing  "preferences, previous experiences of maribell, etc.  -Clarified patient's feelings and perspectives  -Elicited more details about current and recent circumstances  -Emotional support via active and reflective listening, responding to feelings etc.  -Identified patient's strengths/needs  -Identified goals for treatment plan  -Normalized and validated feelings and experiences  -Provided verbal overview of First Episode Program NAVIGATE services  -Provided positive feedback and encouragement  -Provided psychoeducation related to psychosis and related concerns.       11. Measures:    Mental Status Exam  Alertness: alert  and oriented  Behavior/Demeanor: cooperative, pleasant and calm  Speech: normal  Language: intact.   Mood: \"good\"  Thought Process/Associations: unremarkable  Thought Content:  Reports none;  Denies suicidal ideation and delusions  Perception:  Reports none;  Denies auditory hallucinations and visual hallucinations  Insight: adequate  Judgment: good and adequate for safety  Cognition: does  appear grossly intact; formal cognitive testing was not done    EMEKA-7  Not completed. Pt denied anxiety symptoms when asked.     PHQ-9  Not completed. Pt denied depressive symptoms when asked.     [place] Protocol Risk Identification  Pt denied SI when asked.     12. Assessment/Progress Note:     Writer and client met for IRT visit via zoom. Set agenda to check in; continue to expand on IRT modules and material. During check in, client reported having had an okay week. Reported current symptoms to include: occasional lower mood and occasionally feeling overwhelmed and experiencing acute anxiety.  Current stressors include: Pt has worries that her symptoms of psychosis may return, especially when she is alone and often in the mornings. Reported previously that she worries about this maybe a few times a week.  In regards to medications, client reports:experienced a reoccurance of feeling uneasy and restless, reports " "speaking with psychiatrist about this and and determining it is not a side effect. Reports no issues regarding sleep. Substance use: NA. Assessed safety. Client denies SI, denies intent, and denies plan. HI was not present. SIB was not present.  Safety plan has been created in the past. Safety plan was not reviewed today and includes speaking with , speaking with brother or other family, calling crisis lines, going to the nearest ED if SI/HI/SIB becomes overwhelming, worsens and/or becomes active. Client did not identify concern to be addressed today.     Today's visit:  Client reported no panic attack symptoms over the past week. Laura mentioned that she \"feels like a zombie\" and does not feel like her old self - writer provided education about positive and negative symptoms of psychosis and ohw negative symptoms of psychosis can linger for longer during the recovery phase of psychosis. Laura discussed focusing on her routine of prayer, showering, reading her bible, taking her medications and eating breakfast - noted that this routine and her focus on the present is what is helpful for her to manage the symptoms of panic. Will continue to monitor and discuss. Client and writer continued to discuss coping with stress, particularly began identifying strategies that she already uses and exploring strategies that she would like to add to her tool kit. Client previously determined she would like to spend more time doing meaningful activities and strengthen her support system. Today client reported making plans with her best friend to have a meal together and looking into knitting supplies or books.  Discussed the value ot talking about or writing about feelings, and the use of mindfulness. Discussed use of positive self talk and how this is practice that laura already has incorporated into her coping strategies. Will wrap up thoughts about coping with stress module at next visit and identify Laura's preferred " "next topic of discussion.      Laura expressed interest in hearing about other people's experiences of recovery from psychosis, writer sent email with some links to articles/videos.     Laura has reported that she has had multiple traumatic experiences in her past, and did not want to discuss these events in any detail.     Writer used relational and interpersonal approach to explore feelings, motivations, and behavior. Writer offered support, feedback, validation, and reinforced use of skills taught in IRT from modalities including cognitive behavioral therapy, psycho education, and skills training. Promoted understanding of their experiences of psychosis and how it impacts them in important areas of their life and in recovery goals. Reflected on client's strengths and resiliency factors and facilitated discussion on how these can assist in symptom management, recovery, and well-being.     Home practice identified as: use a cold pack the next time you have a flare up of anxiety about symptoms returning. Track your good days and day that you are overwhelmed and make note of any patterns you see.     Email sent:  Eduardo Laura,   I mentioned sending links to other people s stories - here are a few:  Article about a woman who experienced psychosis and how her camila guided her recovery:  https://[email or web address]/losing-touch-with-reality-[email or web address]-scary-dtaxunxhi-ofarsmnyzz-nzosb-my-camila-but-recovery-is-possible/70705.article  Link to page with multiple recovery stories (various):  https://www.psychosisnet.com/help-and-recovery/recovery-stories/   Link to a YouTube channel of a woman who experiences psychosis who makes videos about her experiences:  [email or web address]/[email or web address]/videos   Warmest regards,   KVNG Carrera, LORELEI     13. Plan/Referrals:     Next visit scheduled for one week from today.     Billing for \"Interactive Complexity\"?    No    LORELEI Carrera  "   NAVIGATE Individual Resiliency Trainer

## 2024-01-25 ENCOUNTER — TELEPHONE (OUTPATIENT)
Dept: PSYCHIATRY | Facility: CLINIC | Age: 34
End: 2024-01-25

## 2024-01-25 ENCOUNTER — VIRTUAL VISIT (OUTPATIENT)
Dept: PSYCHIATRY | Facility: CLINIC | Age: 34
End: 2024-01-25
Payer: COMMERCIAL

## 2024-01-25 DIAGNOSIS — F29 PSYCHOSIS, UNSPECIFIED PSYCHOSIS TYPE (H): Primary | ICD-10-CM

## 2024-01-25 NOTE — PROGRESS NOTES
NAVIGATE/STRENGTHS Virtual Visit Progress Note  For Supported Employment & Education    NAVIGATE Enrollee: Laura Webb (1990)     MRN: [id number]  Date of Visit: 1/25/24  Contacted: Laura  Appointment Length: 6 minutes    Discussed:   Writer met with Laura Webb for virtual visit check-in. Meeting agenda included Sent a job search list and I'm going to have her look over them. They are all work form home jobs likle she wanted.    Follow-up: Did you apply to any jobs?    Randi OROZCOATE/STRENGTHS  Supported Employment & Education

## 2024-01-31 ENCOUNTER — TELEPHONE (OUTPATIENT)
Dept: PSYCHIATRY | Facility: CLINIC | Age: 34
End: 2024-01-31

## 2024-02-01 ENCOUNTER — VIRTUAL VISIT (OUTPATIENT)
Dept: PSYCHIATRY | Facility: CLINIC | Age: 34
End: 2024-02-01
Payer: COMMERCIAL

## 2024-02-01 DIAGNOSIS — F29 PSYCHOSIS, UNSPECIFIED PSYCHOSIS TYPE (H): Primary | ICD-10-CM

## 2024-02-01 NOTE — PROGRESS NOTES
NAVIGATE/STRENGTHS Virtual Visit Progress Note  For Supported Employment & Education    NAVIGATE Enrollee: Laura Webb (1990)     MRN: [id number]  Date of Visit: 2/01/24  Contacted: Laura  Appointment Length: 10 min    Discussed:   Writer met with Luara Webb for virtual visit check-in. Meeting agenda included applied to 3/4 of the jobs I sent. She applied to them last thursday. We will review next week if she heard back as well as continue to search for work from home job opportunities     Follow-up: Check in about jobs    Randi Acuña  NAVIGATE/STRENGTHS  Supported Employment & Education

## 2024-02-02 ENCOUNTER — VIRTUAL VISIT (OUTPATIENT)
Dept: PSYCHIATRY | Facility: CLINIC | Age: 34
End: 2024-02-02
Payer: COMMERCIAL

## 2024-02-02 VITALS — HEIGHT: 63 IN | BODY MASS INDEX: 22.15 KG/M2 | WEIGHT: 125 LBS

## 2024-02-02 DIAGNOSIS — F43.10 PTSD (POST-TRAUMATIC STRESS DISORDER): ICD-10-CM

## 2024-02-02 DIAGNOSIS — Z79.899 ENCOUNTER FOR LONG-TERM CURRENT USE OF MEDICATION: ICD-10-CM

## 2024-02-02 DIAGNOSIS — F29 PSYCHOSIS, UNSPECIFIED PSYCHOSIS TYPE (H): Primary | ICD-10-CM

## 2024-02-02 ASSESSMENT — PATIENT HEALTH QUESTIONNAIRE - PHQ9: SUM OF ALL RESPONSES TO PHQ QUESTIONS 1-9: 1

## 2024-02-02 NOTE — NURSING NOTE
Patient declined [facility] QRNS due to patient's child yelling and crying in the background and hard to hear.      Is the patient currently in the state of [place]? YES    Visit mode:VIDEO    If the visit is dropped, the patient can be reconnected by: VIDEO VISIT: Text to cell phone:   Telephone Information:   Mobile [phone]       Will anyone else be joining the visit? NO  (If patient encounters technical issues they should call [phone]    How would you like to obtain your AVS? MyChart    Are changes needed to the allergy or medication list? No    Reason for visit: RECHTOD ALVARES

## 2024-02-02 NOTE — PROGRESS NOTES
"Virtual Visit Details    Originating Location (pt. Location): Home    Distant Location (provider location):  On-site  Platform used for Video Visit: [employer]    [employer] Medication Management Progress Note  A Part of the [facility] First Episode of Psychosis Program    NAVIGATE Enrollee: Laura Webb (1990)     MRN: [id number]  Date:  24         Contributors to the Assessment     Chart Reviewed.   Interview completed with Laura Webb.  Collateral information obtained from Laura.         Chief Complaint      \"Okay\"         Interim History    Laura Webb is a 33 year old female who was last seen in MD clinic one month ago  at which time no medication changes were made. The patient reports good treatment adherence.  History was provided by Laura who was a fair historian.  Since the last visit:  -her anxiety has been better, no more panic attacks as we had discussed the last time. Meditating and praying has helped a lot with this  -Has interview scheduled already at a job that she applied to, wanting to work from home  -Holidays went well, were kind of stressful logistically    PSYCH ROS:  Clinician Rating Form in COMPASS  1. Depressed Mood: Ratin  0 Not reported     1 Very mild occasionally feels sad or  down ; of questionable clinical significance   2 Mild occasionally feels moderately depressed or often feels sad or  down    3 Moderate occasionally feels very depressed or often feels moderately depressed   4 Moderately severe often feels very depressed   5 Severe feels very depressed most of the time   6 Very severe constant extremely painful feelings of depression   U Unable to assess        2. Anxiety/Worry: Rating: 3  0 Not reported     1 Very mild occasionally feels a little anxious; of questionable clinical significance   2 Mild occasionally feels moderately anxious or often feels a little anxious or worried   3 Moderate occasionally feels very anxious or often feels moderately anxious   4 " Moderately severe often feels very anxious or often feels moderately anxious   5 Severe feels very anxious or worried most of the time   6 Very severe patient is continually preoccupied with severe anxiety   U Unable to assess        3. Suicidal Ideation/Behavior: Ratin          0 Not reported     1 Very mild occasional thoughts of dying,  I d be better off dead  or  I wish I were dead    2 Mild frequents thoughts of dying or occasional thoughts of killing self, without plan or method   3 Moderate often thinks of suicide or has though of a specific method   4 Moderately severe has mentally rehearsed a specific method of suicide or has made a suicide attempt with questionable intent to die (e.r. takes aspirins and then tells family)   5 Severe has made preparations for a potentially lethal suicide attempt (e.g acquires a gun and bullets for an attempt)   6 Very severe has made a suicide attempt with an intent to die   U Unable to assess                      4. Elevated/Expansive Mood: Ratin  0 Not at all     1 Very mild questionable; more cheerful than most people in his/her circumstances but of only possible clinical significance   2 Mild brief elevated/expansive mood but only somewhat out of proportion to the circumstances   3 Moderate brief/occasional elevation of mood which is clearly out of proportion to the circumstances   4 Moderately severe sustained/frequent elevation of mood which is clearly out of proportion to the circumstances   5 Severe mood is euphoric most of the time   6 Very severe sustained elevation;  everything is wonderful  almost all of the time   U Unable to assess        5. Hostility/Anger/Irritability/Aggressiveness: Ratin  0 Not at all     1 Very mild occasional irritability of doubtful clinical significance   2 Mild occasionally feels angry or mild or indirect expressions of anger, e.g. sarcasm, disrespect or hostile gestures   3 Moderate frequently feels angry, frequent  irritability or occasional direct expression of anger, e.g. yelling at others   4 Moderately severe often feels very angry, often yells at others or occasionally threatens to harm others   5 Severe has acted on his anger by becoming physically abusive on one or two occasions or makes frequent threats to harm others or is very angry most of the time   6 Very severe has been physically aggressive and/or required intervention to prevent assaultiveness on several occasions; or any serious assaultive act   U Unable to assess        6. Impulsive Behavior: Ratin  0 Not at all     1 Very mild one instance of impulsive behavior which is of doubtful clinical significance   2 Mild occasional impulsive acts, e.g. making phone calls at odd hours   3 Moderate occasional impulsive acts with some potential negative consequence, e.g. leaving work abruptly; changing plans without thinking   4 Moderately severe impulsive acts with definite negative consequences, e.g. overspending on non-essentials; repeated reckless sexual behavior   5 Severe impulsive acts with direct negative consequences, e.g. spends entire income on nonessentials without regard for basic needs   6 Very severe impulsive behavior which is potentially life threatening, e.g. jumps from dangerous height (without suicidal intent) or criminal behavior, e.g. impulsive robbery   U Unable to assess        7. Suspiciousness: Ratin  0 Not present     1 Very mild Seems on guard. Reluctant to respond to some  personal  questions. Reports being overly self-conscious in public   2 Mild Describes incidents in which others have harmed or wanted to harm him/her that sound plausible. Patient feels as if others are watching, laughing, or criticizing him/her in public, but this occurs only occasionally or rarely. Little or no preoccupation   3 Moderate Says others are talking about him/her maliciously, have negative intentions, or may harm him/her. Beyond the likelihood of  plausibility, but not delusional. Incidents of suspected persecution occur occasionally (less than once per week) with some preoccupation   4 Moderately severe Same as 3, but incidents occur frequently such as more than once a week. Patient is moderately preoccupied with ideas of persecution OR patient reports persecutory delusions expressed with much doubt (e.g. partial delusion)   5 Severe Delusional -- speaks of Mafia plots, the FBI, or others poisoning his/her food, persecution by supernatural forces   6 Extremely severe Same as 5, but the beliefs are bizarre or more preoccupying. Patient tends to disclose or act on persecutory delusions.   U Unable to assess        8. Unusual Thought Content: Ratin  0 Not present     1 Very mild Ideas of reference (people may stare or may laugh at him), ideas of persecution (people may mistreat him). Unusual beliefs in psychic green, spirits, UFOs, or unrealistic beliefs in one's own abilities. Not strongly held. Some doubt   2 Mild Same as 1, but degree of reality distortion is more severe as indicated by highly unusual ideas or greater conviction. Content may be typical of delusions (even bizarre), but without full conviction. The delusion does not seem to have fully formed, but is considered as one possible explanation for an unusual experience   3 Moderate Delusion present but no preoccupation or functional impairment. May be an encapsulated delusion or a firmly endorsed absurd belief about past delusional circumstances   4 Moderately severe Full delusion(s) present with some preoccupation OR some areas of functioning disrupted by delusional thinking   5 Severe Full delusion(s) present with much preoccupation OR many areas of functioning are disrupted by delusional thinking   6 Extremely severe Full delusions present with almost   U Unable to assess        9. Hallucinations: Ratin  0 Not present     1 Very mild While resting or going to sleep, sees visions, smells  odors, or hears voices, sounds or whispers in the absence of external stimulation, but no impairment in functioning   2 Mild While in a clear state of consciousness, hears a voice calling the subject s name, experiences non-verbal auditory hallucinations (e.g., sounds or whispers), formless visual hallucinations, or has sensory experiences in the presence of a modality-relevant stimulus (e.g., visual illusions) infrequently (e.g., 1-2 times per week) and with no functional impairment   3 Moderate Occasional verbal, visual, gustatory, olfactory, or tactile hallucinations with no functional impairment OR non-verbal auditory hallucinations/visual illusions more than infrequently or with impairment   4 Moderately severe Experiences daily hallucinations OR some areas of functioning are disrupted by hallucinations   5 Severe Experiences verbal or visual hallucinations several times a day OR many areas of functioning are disrupted by these hallucinations   6 Extremely severe Persistent verbal or visual hallucinations throughout the day OR most areas of functioning are disrupted by these hallucinations   U Unable to assess        10. Conceptual Disorganization: Ratin  0 Not present     1 Very mild Peculiar use of words or rambling but speech is comprehensible   2 Mild Speech a bit hard to understand or make sense of due to tangentiality, circumstantiality, or sudden topic shifts   3 Moderate Speech difficult to understand due to tangentiality, circumstantiality, idiosyncratic speech, or topic shifts on many occasions OR 1-2 instances of incoherent phrases   4 Moderately severe Speech difficult to understand due to circumstantiality, tangentiality, neologisms, blocking, or topic shifts most of the time OR 3-5 instances of incoherent phrases   5 Severe Speech is incomprehensible due to severe impairments most of the time. Many PSRS items cannot be rated by self-report alone   6 Extremely severe Speech is incomprehensible  throughout interview   U Unable to assess        11. Avolition/Apathy: Ratin  0 Not at all     1 Very mild questionable decrease in time spent in goal-directed activities   2 Mild spends less time in goal-directed activities than is appropriate for situation and age   3 Moderate initiates activities at times but does not follow through   4 Moderately severe rarely initiates activity but will passively engage with encouragement   5 Severe almost never initiates activities; requires assistance to accomplish basic activities   6 Very severe does not initiate or persist in any goal-directed activity even with outside assistance   U Unable to assess        12. Asociality/Low Social Drive: Ratin  0 Not at all     1 Very mild questionable   2 Mild slow to initiate social interactions but usually responds to overtures by others   3 Moderate rarely initiates social interactions; sometimes responds to overtures by others   4 Moderately severe does not initiate but sometimes responds to overtures by others; little social interaction outside close family members   5 Severe never initiates and rarely encourages conversations or activities; avoids being with others unless prodded, may have contacts with family   6 Very severe avoids being with others (even family members) whenever possible, extreme social isolation   U Unable to assess        13. Adherence: Days: 0  The longest, continuous amount of time in days, since the last visit when the subject did not take medication      14. EPS Part I: Ratin  Rate Elbow Rigidity for all subjects  0 Normal   1 Slight stiffness and resistance   2 Moderate stiffness and resistance   3 Marked rigidity with difficulty in passive movement   4 Extreme stiffness and rigidity with almost a frozen joint   U Unable to assess            EPS Part 2: Signs of EPS: 0  Are there are other signs of EPS (eg diminished arm swing, postural instability, cogwheeling, tremor, akinesia) present  based upon patient report or exam?  0 No   1 Yes      15. Akathesia: Ratin  0 No restlessness reported or observed   1 Mild restlessness observed; e.g., occasional jiggling of the foot occurs when subject is seated   2 Moderate restlessness observed; e.g., on several occasions, jiggles foot, crosses and uncrosses legs or twists a part of the body   3 Restlessness is frequently observed; e.g., the foot or legs moving most of the time   4 Restlessness persistently observed; e.g., subject cannot sit still, may get up and walk   U Unable to assess      16. Dyskinetic Movement Ratings: Ratin  0 None   1 Minimal, may be extreme normal   2 Mild   3 Moderate   4 Severe   U Unable to assess      SIDE EFFECT ASSESSMENT:  Clinician Rating Form in COMPASS - Side Effect Assessment  Address side effects reported by the patient and rate using this scale  0 Not present   1 Minimal, may be extreme normal   2 Mild   3 Moderate   4 Severe   U Unable to assess   P Present, but not related      Feeling dizzy or faint: 0  Blurred vision: 0  Dry mouth: 0  Too much saliva/droolin  Nausea:  0  Constipation: 0  Increased appetite: 0  Weight gain: 0  Weight loss: 0  Feeling tired/fatigue: 0  Daytime sedation: 0  Hypersomnia: 0  Insomnia: 0  low libido: 0  Other problems with sex: 0  Breast enlargement or discharge:  0  Irregular Menstruation or amenorrhea: 0  Other (please list and rate): 0     RECENT SUBSTANCE USE:  Clinician Rating Form in [facility] - Substance Use Assessment  Alcohol Use Severity: Ratin  0 none   1 use without impairment: drinks but no immediate social or medical impairment   2 use with impairment: e.g. becomes grossly intoxicated; alcohol use or withdrawal compromises school, work or social functioning; alcohol use or withdrawal exacerbates symptoms (e.g. gets depressed when drinking)      Marijuana Use Severity: Ratin  0 none   1 occasional use without impairment: e.g. uses marijuana a few days a month  "and has no immediate social or medical impairment   2 frequent use without impairment: e.g. uses marijuana several or more days a week but has no immediate social or medical impairment   3 use with impairment: e.g. becomes grossly intoxicated; marijuana use compromises school, work or social functioning; marijuana use exacerbates symptoms (e.g. gets paranoid when using)      Other Drug Use Severity: Ratin  0 none   1 occasional use without impairment: e.g. uses drug(s) a few days a month and has no immediate social or medical impairment   2 frequent use without impairment: e.g. uses drug(s) several or more days a week but has no immediate social or medical impairment   3 use with impairment: e.g. becomes grossly intoxicated; drug use compromises school, work or social functioning; drug use exacerbates symptoms (e.g. gets paranoid when using)      RECENT SOCIAL HISTORY:  SEE HPI    Medical ROS:  none         First Episode of Psychosis History      DUP (duration untreated psychosis):  several days to eight months  Route to initial care: hospitalization  Medication adherence overall:  See above, Clinician Rating Form in COMPASS Item 13  General frequency of visits:  monthly  Participation in groups:  No    Reviewed for completion of First Episode work-up:  Yes  First episode workup:  Not Done (if completed, see LABS for results)  MATRICS Consensus Cognitive Battery:  Not Done (if completed, see LABS for results)         Medical/Surgical History     Blood-group specific substance    There is no problem list on file for this patient.           Medications     Current Outpatient Medications   Medication Sig Dispense Refill    benztropine (COGENTIN) 0.5 MG tablet Take 1 tablet (0.5 mg) by mouth 2 times daily 60 tablet 2    paliperidone (INVEGA SUSTENNA) 156 MG/ML TERRELL injection Inject 1 mL (156 mg) into the muscle every 28 days 1 mL 11             Vitals     Ht 1.6 m (5' 3\")   Wt 56.7 kg (125 lb)   BMI 22.14 kg/m   "          Mental Status Exam     Alertness: alert  and oriented  Appearance: well groomed  Behavior/Demeanor: pleasant and calm, with fair  eye contact   Speech: normal; more talkative than at previous appointments  Language: no obvious problem  Psychomotor: normal or unremarkable  Mood:  OK  Affect: blunted; was congruent to mood; was congruent to content  Thought Process/Associations: unremarkable  Thought Content:  Reports none;  Denies suicidal and violent ideation and delusions  Perception:  Reports none;  Denies auditory hallucinations and visual hallucinations  Insight:  difficult to assess as her memory of certain events is limited  Judgment: good, adequate for safety  Cognition: does  appear grossly intact; formal cognitive testing was not done         Labs and Data     RATING SCALES: AIMS due    PHQ9 TODAY =       11/27/2023    12:14 PM 12/22/2023    10:18 AM 2/2/2024     9:14 AM   PHQ-9 SCORE   PHQ-9 Total Score 0 9 1       ANTIPSYCHOTIC LABS ROUTINE    [glu, A1C, lipids (focus LDL), liver enzymes, WBC, ANEU, Hgb, plts]   q12 mo  No lab results found.  No lab results found.  No lab results found.  No lab results found.    Narrative & Impression   EXAM: MR BRAIN W/O and W CONTRAST  LOCATION: [facility]  DATE: 8/22/2023     INDICATION:  Psychosis, unspecified psychosis type (H)  COMPARISON: None.  CONTRAST: 7ml Gadavist  TECHNIQUE: Routine multiplanar multisequence head MRI without and with intravenous contrast.     FINDINGS:  INTRACRANIAL CONTENTS: No acute or subacute infarct. No mass, acute hemorrhage, or extra-axial fluid collections. Normal brain parenchymal signal. Normal ventricles and sulci. Normal position of the cerebellar tonsils. No pathologic contrast enhancement.   Posterior fossa structures including cerebellar hemispheres, fourth ventricle and CP angle cisterns are clear. Nasopharynx is clear. Region of the sella and suprasellar cistern are clear.     SELLA: No  abnormality accounting for technique.     OSSEOUS STRUCTURES/SOFT TISSUES: Normal marrow signal. The major intracranial vascular flow voids are maintained.      ORBITS: No abnormality accounting for technique.      SINUSES/MASTOIDS: No paranasal sinus mucosal disease. No middle ear or mastoid effusion.                                                                    IMPRESSION:  1.  Normal head MRI.            Psychiatric Diagnoses     Schizophreniform disorder, with positive prognostic features  H/o catatonia  Likely PTSD         Assessment     From intake, 8/4/23: Laura Webb is a 32 year old  Black or  Not  or  female who presented for a comprehensive assessment of psychiatric symptoms by the First Episode of Psychosis Navigate Program. Diagnostically challenging given single-episode of mental illness. Differential is broad including psychosis due to medical condition (seizure), bipolar disorder, MDD single episode with psychotic features. Substances do not appear to be a contributing factor. Schizophrenia also considered, though unlikely given rapid-onset of her symptoms and high cognitive and psychosocial functioning following her first episode / hospitalization. Her chart review and interview today present as most consistent with schizophreniform disorder. Prognosis is fair-to-good. Rapid onset of symptoms is positive predictive factor for resolution of symptoms / return to baseline.   We discussed this assessment with her today. We discussed our recommendations: to continue current medications, complete her medical / neurological workup to rule-out underlying epileptic and autoimmune etiologies. Recommend continuing long-acting-injectable for now, ideally for 1 year following resolution of her psychotic symptoms. She expressed understanding of this plan.      TODAY Laura denies any symptoms of psychosis, low mood, or eduardo, and today reports an improvement in  previously reported panic attacks with prayer and meditation. Continues to have nightmares and hypervigilance but not interested in medications. I expressed that I am available to talk about medications for PTSD which I believe she has if she is interested in the future, and she voiced her understanding.    PSYCHOTROPIC DRUG INTERACTIONS:   None.  MANAGEMENT:  N/A         Plan   1) Medications   - continue Invega Sustenna 156 mg IM q28 days (gets this at [facility])  - continue benztropine 0.5 mg BID      2) Therapy-  weekly IRT      3) Next Due   Labs- remaining first episode labs ordered 08/10/23, still pending; now due for AP monitoring labs  EKG- last completed 4/20/23, repeat if increasing or adding Qtc- prolonging medications   Rating scales- AIMS: annually     4) Referrals  Medical concerns- seizure history, neurology referral for EEG declined by patient       5) RTC: 1 month     6) Crisis Numbers:   Provided routinely in AVS     After hours:  [phone]    TREATMENT RISK STATEMENT:  The risks, benefits, alternatives and potential adverse effects have been discussed and are understood by the pt. The pt understands the risks of using street drugs or alcohol. There are no medical contraindications, the pt agrees to treatment with the ability to do so. The pt knows to call the clinic for any problems or to access emergency care if needed.  Medical and substance use concerns are documented above.  Psychotropic drug interaction check was done, including changes made today.    PROVIDER:  Yaneth Guzmán MD     The longitudinal plan of care for the diagnosis(es)/condition(s) as documented were addressed during this visit. Due to the added complexity in care, I will continue to support Laura in the subsequent management and with ongoing continuity of care.

## 2024-02-06 ENCOUNTER — VIRTUAL VISIT (OUTPATIENT)
Dept: PSYCHIATRY | Facility: CLINIC | Age: 34
End: 2024-02-06
Payer: COMMERCIAL

## 2024-02-06 DIAGNOSIS — F29 PSYCHOSIS, UNSPECIFIED PSYCHOSIS TYPE (H): Primary | ICD-10-CM

## 2024-02-06 NOTE — PROGRESS NOTES
NAVIGJESUS Clinician Contact & Progress Note  For Individual Resiliency Training (IRT)  A Part of the [facility] First Episode of Psychosis Program    NAVIGATE Enrollee: Laura Webb (1990)     MRN: [id number]  Date:  2/06/24  Diagnosis:Psychosis, unspecified  Clinician: MILAN Individual Resiliency Trainer, LORELEI Carrera     1. Type of contact: (majority of time spent)  IRT Session via telehealth  Mode of communication: [employer] (HIPAA compliant, secure platform). Patient consented verbally to this mode of therapy today.  Reason for telehealth: COVID-19. This patient visit was converted to a telehealth visit to minimize exposure to COVID-19.    2. People present:   Writer  Client: Yes    3. Length of Actual Contact: Start Time: 2:02; End Time: 2:45     4. Location of contact:  Originating Location (patient location): family home, located in [place], [place]  Distant Location (provider location): Home office, located in [place], [place], using appropriate privacy considerations and procedures    5. Did the client complete the home practice option(s) from the previous session: Completed    6. Motivational Teaching Strategies:  Connect info and skills with personal goals  Promote hope and positive expectations    7. Educational Teaching Strategies:  Review of written material/education  Relate information to client's experience  Ask questions to check comprehension  Break down information into small chunks    8. CBT Teaching Strategies:  Reinforcement and shaping (positive feedback for steps towards goals, gains in knowledge & skills and follow-through on home assignments)    9. IRT Module(s) Addressed:  Module 3 - Coping with Stress    10. Techniques utilized:   [place] announced at beginning of session  Review of homework  Review of previous meeting  Present new material  Summarize progress made in current session  Other techniques (please specify):   -build rapport by discussing  "preferences, previous experiences of maribell, etc.  -Clarified patient's feelings and perspectives  -Elicited more details about current and recent circumstances  -Emotional support via active and reflective listening, responding to feelings etc.  -Identified patient's strengths/needs  -Identified goals for treatment plan  -Normalized and validated feelings and experiences  -Provided verbal overview of First Episode Program NAVIGATE services  -Provided positive feedback and encouragement  -Provided psychoeducation related to psychosis and related concerns.       11. Measures:    Mental Status Exam  Alertness: alert  and oriented  Behavior/Demeanor: cooperative, pleasant and calm  Speech: normal  Language: intact.   Mood: \"good\"  Thought Process/Associations: unremarkable  Thought Content:  Reports none;  Denies suicidal ideation and delusions  Perception:  Reports none;  Denies auditory hallucinations and visual hallucinations  Insight: adequate  Judgment: good and adequate for safety  Cognition: does  appear grossly intact; formal cognitive testing was not done    EMEKA-7  Not completed. Pt denied anxiety symptoms when asked.     PHQ-9  Not completed. Pt denied depressive symptoms when asked.     [place] Protocol Risk Identification  Pt denied SI when asked.     12. Assessment/Progress Note:     Writer and client met for IRT visit via zoom. Set agenda to check in; continue to expand on IRT modules and material. During check in, client reported having had an okay week. Reported current symptoms to include: occasional lower mood and occasionally feeling overwhelmed and experiencing acute anxiety.  Current stressors include: Pt has worries that her symptoms of psychosis may return, especially when she is alone and often in the mornings. Reported previously that she worries about this maybe a few times a week.  In regards to medications, client reports:experienced a reoccurance of feeling uneasy and restless, reports " speaking with psychiatrist about this and and determining it is not a side effect. Reports no issues regarding sleep. Substance use: NA. Assessed safety. Client denies SI, denies intent, and denies plan. HI was not present. SIB was not present.  Safety plan has been created in the past. Safety plan was not reviewed today and includes speaking with , speaking with brother or other family, calling crisis lines, going to the nearest ED if SI/HI/SIB becomes overwhelming, worsens and/or becomes active. Client did not identify concern to be addressed today.     Today's visit:  Writer and client reviewed events since last visit, symptoms, medication, and work search. Laura discussed past two weeks. Laura denies any psychosis, reports some minor episodes of panic related to fear of an episode of psychosis re-occruing. Discussed how she was able to cope - Laura has several ways she manages panic symptoms when they occur, reports that they are less intense than previous symptoms. Denies concerns about medication. 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 Reviewed creating a peaceful scene and progressive muscle relaxation techniques and summarized coping with stress module. Reviewed topics of other IRT modules and Laura identified interest in savoring good things as next module to review. Writer sent email with these modules attached.     Laura has reported that she has had multiple traumatic experiences in her past, and did not want to discuss these events in any detail.     Writer used relational and interpersonal approach to explore feelings, motivations, and behavior. Writer offered support, feedback, validation, and reinforced use of skills taught in IRT from modalities including cognitive behavioral therapy, psycho education, and skills training. Promoted understanding of their experiences of psychosis and how it impacts them in important areas of their life and in recovery goals. Reflected on client's strengths  "and resiliency factors and facilitated discussion on how these can assist in symptom management, recovery, and well-being.     Home practice identified as: use a cold pack or other coping strategies the next time you have a flare up of anxiety about symptoms returning.     Email sent:  Aurelio Sanchez,   I attached the coping with stress module we wrapped up, and the savoring good things module that we will start to discuss at the next visit. Thanks, and have good week!  Warmest regards,   KVNG Carrera, LICSW     13. Plan/Referrals:     Next visit scheduled for one week from today.     Billing for \"Interactive Complexity\"?    No    LORELEI Carrera    NAVIGATE Individual Resiliency Trainer  "

## 2024-02-13 ENCOUNTER — TELEPHONE (OUTPATIENT)
Dept: PSYCHIATRY | Facility: CLINIC | Age: 34
End: 2024-02-13

## 2024-02-13 ENCOUNTER — VIRTUAL VISIT (OUTPATIENT)
Dept: PSYCHIATRY | Facility: CLINIC | Age: 34
End: 2024-02-13
Payer: COMMERCIAL

## 2024-02-13 DIAGNOSIS — F29 PSYCHOSIS, UNSPECIFIED PSYCHOSIS TYPE (H): Primary | ICD-10-CM

## 2024-02-13 NOTE — PROGRESS NOTES
NAVIGJESUS Clinician Contact & Progress Note  For Individual Resiliency Training (IRT)  A Part of the [facility] First Episode of Psychosis Program    NAVIGATE Enrollee: Laura Webb (1990)     MRN: [id number]  Date:  2/13/24  Diagnosis:Psychosis, unspecified  Clinician: MILAN Individual Resiliency Trainer, LORELEI Carrera     1. Type of contact: (majority of time spent)  IRT Session via telehealth  Mode of communication: [employer] (HIPAA compliant, secure platform). Patient consented verbally to this mode of therapy today.  Reason for telehealth: COVID-19. This patient visit was converted to a telehealth visit to minimize exposure to COVID-19.    2. People present:   Writer  Client: Yes    3. Length of Actual Contact: Start Time: 2:05; End Time: 2:50     4. Location of contact:  Originating Location (patient location): family home, located in [place], [place]  Distant Location (provider location): Home office, located in [place], [place], using appropriate privacy considerations and procedures    5. Did the client complete the home practice option(s) from the previous session: Completed    6. Motivational Teaching Strategies:  Connect info and skills with personal goals  Promote hope and positive expectations    7. Educational Teaching Strategies:  Review of written material/education  Relate information to client's experience  Ask questions to check comprehension  Break down information into small chunks    8. CBT Teaching Strategies:  Reinforcement and shaping (positive feedback for steps towards goals, gains in knowledge & skills and follow-through on home assignments)    9. IRT Module(s) Addressed:  Module 7 - savoring good things    10. Techniques utilized:   [place] announced at beginning of session  Review of homework  Review of previous meeting  Present new material  Summarize progress made in current session  Other techniques (please specify):   -build rapport by discussing  "preferences, previous experiences of maribell, etc.  -Clarified patient's feelings and perspectives  -Elicited more details about current and recent circumstances  -Emotional support via active and reflective listening, responding to feelings etc.  -Identified patient's strengths/needs  -Identified goals for treatment plan  -Normalized and validated feelings and experiences  -Provided verbal overview of First Episode Program NAVIGATE services  -Provided positive feedback and encouragement  -Provided psychoeducation related to psychosis and related concerns.       11. Measures:    Mental Status Exam  Alertness: alert  and oriented  Behavior/Demeanor: cooperative, pleasant and calm  Speech: normal  Language: intact.   Mood: \"good\"  Thought Process/Associations: unremarkable  Thought Content:  Reports none;  Denies suicidal ideation and delusions  Perception:  Reports none;  Denies auditory hallucinations and visual hallucinations  Insight: adequate  Judgment: good and adequate for safety  Cognition: does  appear grossly intact; formal cognitive testing was not done    EMEKA-7  Not completed. Pt denied anxiety symptoms when asked.     PHQ-9  Not completed. Pt denied depressive symptoms when asked.     [place] Protocol Risk Identification  Pt denied SI when asked.     12. Assessment/Progress Note:     Writer and client met for IRT visit via zoom. Set agenda to check in; continue to expand on IRT modules and material. During check in, client reported having had an okay week. Reported current symptoms to include: occasional lower mood and occasionally feeling overwhelmed and experiencing acute anxiety.  Current stressors include: Pt has worries that her symptoms of psychosis may return, especially when she is alone and often in the mornings. Reported previously that she worries about this maybe a few times a week.  In regards to medications, client reports:experienced a reoccurance of feeling uneasy and restless, reports " "speaking with psychiatrist about this and and determining it is not a side effect. Reports no issues regarding sleep. Substance use: NA. Assessed safety. Client denies SI, denies intent, and denies plan. HI was not present. SIB was not present.  Safety plan has been created in the past. Safety plan was not reviewed today and includes speaking with , speaking with brother or other family, calling crisis lines, going to the nearest ED if SI/HI/SIB becomes overwhelming, worsens and/or becomes active.     Client did identify concern to be addressed today. Specifically she wanted to discuss a feeling of being contained and not herself, which has been persistently bothering her for a few months now.     Today's visit:  Writer and client reviewed events since last visit, symptoms, medication. No change reported for medications. Laura denied safety concerns. Laura denies psychosis symptoms, reports some mild panic symptoms on one occasion. Laura mentioned an issue that has been bothering her for a few months and wanted to discuss it. She reports that she \"feells contained or restricted, like every day is kind of just going by and I feel like I'm not experiencing it fully.\" Regarding timeframe, Laura states that it started a bit after she started medication for psychosis and she has been most bothered by it in the past few months. Writer and client discussed negative symptoms of psychosis - Laura does not think that this fits her experience. She is open to it maybe being a side effect of DOVE, but not certain. Writer provided brief description of dissociation - Laura felt that it may be more applicable to her experiences. Will continue to discuss at next visit.     Laura and writer began savoring good things module, reviewed resiliency and discussed a home practice of noting good things each day. Will review at next visit.     Laura has reported that she has had multiple traumatic experiences in her past, and did not " "want to discuss these events in any detail.     Writer used relational and interpersonal approach to explore feelings, motivations, and behavior. Writer offered support, feedback, validation, and reinforced use of skills taught in IRT from modalities including cognitive behavioral therapy, psycho education, and skills training. Promoted understanding of their experiences of psychosis and how it impacts them in important areas of their life and in recovery goals. Reflected on client's strengths and resiliency factors and facilitated discussion on how these can assist in symptom management, recovery, and well-being.     Home practice identified as: use a cold pack or other coping strategies the next time you have a flare up of anxiety about symptoms returning.       13. Plan/Referrals:     Next visit scheduled for one week from today.     Billing for \"Interactive Complexity\"?    No    LORELEI CarreraATE Individual Resiliency Trainer  "

## 2024-02-20 ENCOUNTER — VIRTUAL VISIT (OUTPATIENT)
Dept: PSYCHIATRY | Facility: CLINIC | Age: 34
End: 2024-02-20
Payer: COMMERCIAL

## 2024-02-20 DIAGNOSIS — F29 PSYCHOSIS, UNSPECIFIED PSYCHOSIS TYPE (H): Primary | ICD-10-CM

## 2024-02-21 NOTE — PROGRESS NOTES
NAVIGJESUS Clinician Contact & Progress Note  For Individual Resiliency Training (IRT)  A Part of the [facility] First Episode of Psychosis Program    NAVIGATE Enrollee: Laura Webb (1990)     MRN: [id number]  Date:  2/20/24  Diagnosis:Psychosis, unspecified  Clinician: MILAN Individual Resiliency Trainer, LORELEI Carrera     1. Type of contact: (majority of time spent)  IRT Session via telehealth  Mode of communication: [employer] (HIPAA compliant, secure platform). Patient consented verbally to this mode of therapy today.  Reason for telehealth: COVID-19. This patient visit was converted to a telehealth visit to minimize exposure to COVID-19.    2. People present:   Writer  Client: Yes    3. Length of Actual Contact: Start Time: 2:04; End Time: 2:55     4. Location of contact:  Originating Location (patient location): family home, located in [place], [place]  Distant Location (provider location): Home office, located in [place], [place], using appropriate privacy considerations and procedures    5. Did the client complete the home practice option(s) from the previous session: Completed    6. Motivational Teaching Strategies:  Connect info and skills with personal goals  Promote hope and positive expectations    7. Educational Teaching Strategies:  Review of written material/education  Relate information to client's experience  Ask questions to check comprehension  Break down information into small chunks    8. CBT Teaching Strategies:  Reinforcement and shaping (positive feedback for steps towards goals, gains in knowledge & skills and follow-through on home assignments)    9. IRT Module(s) Addressed:  Module 7 - savoring good things    10. Techniques utilized:   [place] announced at beginning of session  Review of homework  Review of previous meeting  Present new material  Summarize progress made in current session  Other techniques (please specify):   -build rapport by discussing  "preferences, previous experiences of maribell, etc.  -Clarified patient's feelings and perspectives  -Elicited more details about current and recent circumstances  -Emotional support via active and reflective listening, responding to feelings etc.  -Identified patient's strengths/needs  -Identified goals for treatment plan  -Normalized and validated feelings and experiences  -Provided verbal overview of First Episode Program NAVIGATE services  -Provided positive feedback and encouragement  -Provided psychoeducation related to psychosis and related concerns.       11. Measures:    Mental Status Exam  Alertness: alert  and oriented  Behavior/Demeanor: cooperative, pleasant and calm  Speech: normal  Language: intact.   Mood: \"good\"  Thought Process/Associations: unremarkable  Thought Content:  Reports none;  Denies suicidal ideation and delusions  Perception:  Reports none;  Denies auditory hallucinations and visual hallucinations  Insight: adequate  Judgment: good and adequate for safety  Cognition: does  appear grossly intact; formal cognitive testing was not done    EMEKA-7  Not completed. Pt denied anxiety symptoms when asked.     PHQ-9  Not completed. Pt denied depressive symptoms when asked.     [place] Protocol Risk Identification  Pt denied SI when asked.     12. Assessment/Progress Note:     Writer and client met for IRT visit via zoom. Set agenda to check in; continue to expand on IRT modules and material. During check in, client reported having had an okay week. Reported current symptoms to include: occasional lower mood and occasionally feeling overwhelmed and experiencing acute anxiety.  Current stressors include: Pt has worries that her symptoms of psychosis may return, especially when she is alone and often in the mornings. Reported previously that she worries about this maybe a few times a week.  In regards to medications, client reports:experienced a reoccurance of feeling uneasy and restless, reports " speaking with psychiatrist about this and and determining it is not a side effect. Reports no issues regarding sleep. Substance use: NA. Assessed safety. Client denies SI, denies intent, and denies plan. HI was not present. SIB was not present.  Safety plan has been created in the past. Safety plan was not reviewed today and includes speaking with , speaking with brother or other family, calling crisis lines, going to the nearest ED if SI/HI/SIB becomes overwhelming, worsens and/or becomes active.     Client did not identify concern to be addressed today.    Today's visit:  Writer and client reviewed events since last visit, symptoms, medication. No change reported for medications. Laura denied safety concerns. Laura denies psychosis symptoms, denies panic symptoms, reports that the emotional blunting is lessened since previous week. Laura is less certain that the emotional blunting or dissociation discussed last week is a full symptom - may need to explore further. Laura discussed previous week and updates, denied any stressors.     Laura and writer continued savoring good things module, reviewed resiliency and discussed a home practice of noting good things each day. Laura reports that she did not record good things in a note as she had previously indicated she wanted to do. Writer and Laura discussed barriers and Laura put a phone alarm on for this practice over the coming week. Discussed a good thing that happened in the past 24 hours and identified that Laura may tend to downplay her own role in making good things happen. Discussed acceptance of symptoms and giving oneself sinai or self-compassion. Laura agreed to explore what her camila says about self-compassion in the coming week. Laura identified needing to find something to do that is outside of her current routines. Will review at next visit.     Laura has reported that she has had multiple traumatic experiences in her past, and did not want to  "discuss these events in any detail.     Writer used relational and interpersonal approach to explore feelings, motivations, and behavior. Writer offered support, feedback, validation, and reinforced use of skills taught in IRT from modalities including cognitive behavioral therapy, psycho education, and skills training. Promoted understanding of their experiences of psychosis and how it impacts them in important areas of their life and in recovery goals. Reflected on client's strengths and resiliency factors and facilitated discussion on how these can assist in symptom management, recovery, and well-being.     Home practice identified as: use a cold pack or other coping strategies the next time you have a flare up of anxiety about symptoms returning.       13. Plan/Referrals:     Next visit scheduled for one week from today.     Billing for \"Interactive Complexity\"?    No    LORELEI Carrera Individual Resiliency Trainer  "

## 2024-02-27 ENCOUNTER — VIRTUAL VISIT (OUTPATIENT)
Dept: PSYCHIATRY | Facility: CLINIC | Age: 34
End: 2024-02-27
Payer: COMMERCIAL

## 2024-02-27 DIAGNOSIS — F29 PSYCHOSIS, UNSPECIFIED PSYCHOSIS TYPE (H): Primary | ICD-10-CM

## 2024-02-28 NOTE — PROGRESS NOTES
NAVIGJESUS Clinician Contact & Progress Note  For Individual Resiliency Training (IRT)  A Part of the [facility] First Episode of Psychosis Program    NAVIGATE Enrollee: Laura Webb (1990)     MRN: [id number]  Date:  2/27/24  Diagnosis:Psychosis, unspecified  Clinician: MILAN Individual Resiliency Trainer, LORELEI Carrera     1. Type of contact: (majority of time spent)  IRT Session via telehealth  Mode of communication: [employer] (HIPAA compliant, secure platform). Patient consented verbally to this mode of therapy today.  Reason for telehealth: COVID-19. This patient visit was converted to a telehealth visit to minimize exposure to COVID-19.    2. People present:   Writer  Client: Yes    3. Length of Actual Contact: Start Time: 2:03; End Time: 2:53     4. Location of contact:  Originating Location (patient location): family home, located in [place], [place]  Distant Location (provider location): Home office, located in [place], [place], using appropriate privacy considerations and procedures    5. Did the client complete the home practice option(s) from the previous session: Completed    6. Motivational Teaching Strategies:  Connect info and skills with personal goals  Promote hope and positive expectations    7. Educational Teaching Strategies:  Review of written material/education  Relate information to client's experience  Ask questions to check comprehension  Break down information into small chunks    8. CBT Teaching Strategies:  Reinforcement and shaping (positive feedback for steps towards goals, gains in knowledge & skills and follow-through on home assignments)    9. IRT Module(s) Addressed:  Module 7 - savoring good things    10. Techniques utilized:   [place] announced at beginning of session  Review of homework  Review of previous meeting  Present new material  Summarize progress made in current session  Other techniques (please specify):   -build rapport by discussing  "preferences, previous experiences of maribell, etc.  -Clarified patient's feelings and perspectives  -Elicited more details about current and recent circumstances  -Emotional support via active and reflective listening, responding to feelings etc.  -Identified patient's strengths/needs  -Identified goals for treatment plan  -Normalized and validated feelings and experiences  -Provided verbal overview of First Episode Program NAVIGATE services  -Provided positive feedback and encouragement  -Provided psychoeducation related to psychosis and related concerns.       11. Measures:    Mental Status Exam  Alertness: alert  and oriented  Behavior/Demeanor: cooperative, pleasant and calm  Speech: normal  Language: intact.   Mood: \"good\"  Thought Process/Associations: unremarkable  Thought Content:  Reports none;  Denies suicidal ideation and delusions  Perception:  Reports none;  Denies auditory hallucinations and visual hallucinations  Insight: adequate  Judgment: good and adequate for safety  Cognition: does  appear grossly intact; formal cognitive testing was not done    EMEKA-7  Not completed. Pt denied anxiety symptoms when asked.     PHQ-9  Not completed. Pt denied depressive symptoms when asked.     [place] Protocol Risk Identification  Pt denied SI when asked.     12. Assessment/Progress Note:     Writer and client met for IRT visit via zoom. Set agenda to check in; continue to expand on IRT modules and material. During check in, client reported having had an okay week. Reported current symptoms to include: occasional lower mood and occasionally feeling overwhelmed and experiencing acute anxiety.  Current stressors include: Pt has worries that her symptoms of psychosis may return, especially when she is alone and often in the mornings. Reported previously that she worries about this maybe a few times a week.  In regards to medications, client reports:experienced a reoccurance of feeling uneasy and restless, reports " speaking with psychiatrist about this and and determining it is not a side effect. Reports no issues regarding sleep. Substance use: NA. Assessed safety. Client denies SI, denies intent, and denies plan. HI was not present. SIB was not present.  Safety plan has been created in the past. Safety plan was not reviewed today and includes speaking with , speaking with brother or other family, calling crisis lines, going to the nearest ED if SI/HI/SIB becomes overwhelming, worsens and/or becomes active.     Client did not identify concern to be addressed today.    Today's visit:  Writer and client reviewed events since last visit, symptoms, medication. No change reported for medications. Laura denied safety concerns. Laura denies psychosis symptoms, denies panic symptoms, reports that the emotional blunting is lessened since previous weeks. Laura is less certain that the emotional blunting or dissociation discussed last week is a full symptom - may need to explore further. Laura discussed previous week and updates, denied any stressors. Laura reports that her seleep is consistent  And sufficient for rest, but earlier than she would prefer.     Laura and writer continued savoring good things module, reviewed resiliency and discussed a home practice of noting good things each day. Laura reports that she did make mental note of good things for the past 3-4 days and spent time looking at herself in the mirror and deliberately thinking through the good thing that she noted for that day. Writer and Laura discussed the patterns she noticed and also what it was like to discuss this practice with her . Laura plans to continue incorporating this practice and may include it in family prayer time, too. Continued to discuss acceptance of symptoms and giving oneself sinai or self-compassion. Laura spent time in past week exploring what her camila says about self-compassion, plans to spend more time researching in the  "coming week. Laura identified needing to find something to do that is outside of her current routines. Will review at next visit. Plan to discuss active listening at next visit.     Laura has reported that she has had multiple traumatic experiences in her past, and did not want to discuss these events in any detail.     Writer used relational and interpersonal approach to explore feelings, motivations, and behavior. Writer offered support, feedback, validation, and reinforced use of skills taught in IRT from modalities including cognitive behavioral therapy, psycho education, and skills training. Promoted understanding of their experiences of psychosis and how it impacts them in important areas of their life and in recovery goals. Reflected on client's strengths and resiliency factors and facilitated discussion on how these can assist in symptom management, recovery, and well-being.     Home practice identified as: use a cold pack or other coping strategies the next time you have a flare up of anxiety about symptoms returning.       13. Plan/Referrals:     Next visit scheduled for one week from today.     Billing for \"Interactive Complexity\"?    No    LORELEI CarreraATE Individual Resiliency Trainer  "

## 2024-03-05 ENCOUNTER — VIRTUAL VISIT (OUTPATIENT)
Dept: PSYCHIATRY | Facility: CLINIC | Age: 34
End: 2024-03-05
Payer: COMMERCIAL

## 2024-03-05 DIAGNOSIS — F29 PSYCHOSIS, UNSPECIFIED PSYCHOSIS TYPE (H): Primary | ICD-10-CM

## 2024-03-06 NOTE — PROGRESS NOTES
NAVIGJESUS Clinician Contact & Progress Note  For Individual Resiliency Training (IRT)  A Part of the [facility] First Episode of Psychosis Program    NAVIGATE Enrollee: Laura Webb (1990)     MRN: [id number]  Date:  3/05/24  Diagnosis:Psychosis, unspecified  Clinician: MILAN Individual Resiliency Trainer, LORELEI Carrera     1. Type of contact: (majority of time spent)  IRT Session via telehealth  Mode of communication: [employer] (HIPAA compliant, secure platform). Patient consented verbally to this mode of therapy today.  Reason for telehealth: COVID-19. This patient visit was converted to a telehealth visit to minimize exposure to COVID-19.    2. People present:   Writer  Client: Yes    3. Length of Actual Contact: Start Time: 2:03; End Time: 2:53     4. Location of contact:  Originating Location (patient location): family home, located in [place], [place]  Distant Location (provider location): Home office, located in [place], [place], using appropriate privacy considerations and procedures    5. Did the client complete the home practice option(s) from the previous session: Completed    6. Motivational Teaching Strategies:  Connect info and skills with personal goals  Promote hope and positive expectations    7. Educational Teaching Strategies:  Review of written material/education  Relate information to client's experience  Ask questions to check comprehension  Break down information into small chunks    8. CBT Teaching Strategies:  Reinforcement and shaping (positive feedback for steps towards goals, gains in knowledge & skills and follow-through on home assignments)    9. IRT Module(s) Addressed:  Module 7 - savoring good things    10. Techniques utilized:   [place] announced at beginning of session  Review of homework  Review of previous meeting  Present new material  Summarize progress made in current session  Other techniques (please specify):   -build rapport by discussing  "preferences, previous experiences of maribell, etc.  -Clarified patient's feelings and perspectives  -Elicited more details about current and recent circumstances  -Emotional support via active and reflective listening, responding to feelings etc.  -Identified patient's strengths/needs  -Identified goals for treatment plan  -Normalized and validated feelings and experiences  -Provided verbal overview of First Episode Program NAVIGATE services  -Provided positive feedback and encouragement  -Provided psychoeducation related to psychosis and related concerns.       11. Measures:    Mental Status Exam  Alertness: alert  and oriented  Behavior/Demeanor: cooperative, pleasant and calm  Speech: normal  Language: intact.   Mood: \"good\"  Thought Process/Associations: unremarkable  Thought Content:  Reports none;  Denies suicidal ideation and delusions  Perception:  Reports none;  Denies auditory hallucinations and visual hallucinations  Insight: adequate  Judgment: good and adequate for safety  Cognition: does  appear grossly intact; formal cognitive testing was not done    EMEKA-7  Not completed. Pt denied anxiety symptoms when asked.     PHQ-9  Not completed. Pt denied depressive symptoms when asked.     [place] Protocol Risk Identification  Pt denied SI when asked.     12. Assessment/Progress Note:     Writer and client met for IRT visit via zoom. Set agenda to check in; continue to expand on IRT modules and material. During check in, client reported having had an okay week. Reported current symptoms to include: occasional lower mood and occasionally feeling overwhelmed and experiencing acute anxiety.  Current stressors include: Pt occasionally has worries that her symptoms of psychosis may return, especially when she is alone and often in the mornings. Reported previously that she worries about this maybe a few times a week.  In regards to medications, client reports:no current concnerns. Reports no issues regarding sleep " beyond noting that she seems to be tired and go to sleep earlier than she used to, but still sleeps well and now wakes earlier than she used to. Substance use: NA. Assessed safety. Client denies SI, denies intent, and denies plan. HI was not present. SIB was not present.Safety plan has been created in the past. Safety plan was not reviewed today and includes speaking with , speaking with brother or other family, calling crisis lines, going to the nearest ED if SI/HI/SIB becomes overwhelming, worsens and/or becomes active.     Client did not identify concern to be addressed today.    Today's visit:  Writer and client reviewed events since last visit, symptoms, medication. No change reported for medications. Laura denied safety concerns. Laura denies psychosis symptoms, denies panic symptoms, reports that the emotional blunting is lessened since previous weeks. Laura discussed the previous week and updates, denied any stressors. Laura reports that her sleep is consistent and sufficient for rest, but earlier than she would prefer.     Laura and writer continued savoring good things module, reviewed resiliency and discussed a home practice of noting good things each day. Laura reports that she did make mental note of good things for the past 3-4 days and spent time looking at herself in the mirror and deliberately thinking through the good thing that she noted for that day. Writer and Laura discussed the patterns she noticed and also what it was like to discuss this practice with her . Laura attempted to incorporate this practice into family prayer time, but family was too busy doing their own things to participate - Laura plans to keep trying. Writer introduced savoring as a method of mindfully noticing good things in the present moment and Laura agreed to work on noticing in the moment and savoring the present as good things are occurring in the following week. . Continued to discuss acceptance of  "symptoms and giving oneself sinai or self-compassion. Laura spent time in past week exploring what her camila says about self-compassion, plans to spend more time researching in the coming week. Laura identified needing to find something to do that is outside of her current routines.Writer provided an over view of a communication method (active listening) and emailed a writer over view of said method.     Laura has reported that she has had multiple traumatic experiences in her past, and did not want to discuss these events in any detail.     Writer used relational and interpersonal approach to explore feelings, motivations, and behavior. Writer offered support, feedback, validation, and reinforced use of skills taught in IRT from modalities including cognitive behavioral therapy, psycho education, and skills training. Promoted understanding of their experiences of psychosis and how it impacts them in important areas of their life and in recovery goals. Reflected on client's strengths and resiliency factors and facilitated discussion on how these can assist in symptom management, recovery, and well-being.     Home practice identified as: use a cold pack or other coping strategies the next time you have a flare up of anxiety about symptoms returning.     Email sent 3/5:  Aurelio Sanchez,   Here is the sheet we reviewed today that is focusing on how to made someone feel heard. Thanks, and have a great rest of your week!  Warmest regards,   KVNG Carrera, LORELEI     13. Plan/Referrals:     Next visit scheduled for one week from today.     Billing for \"Interactive Complexity\"?    No    LORELEI Carrera    NAVIGATE Individual Resiliency Trainer  "

## 2024-03-08 ENCOUNTER — VIRTUAL VISIT (OUTPATIENT)
Dept: PSYCHIATRY | Facility: CLINIC | Age: 34
End: 2024-03-08
Payer: COMMERCIAL

## 2024-03-08 DIAGNOSIS — F29 PSYCHOSIS, UNSPECIFIED PSYCHOSIS TYPE (H): Primary | ICD-10-CM

## 2024-03-08 DIAGNOSIS — F43.10 PTSD (POST-TRAUMATIC STRESS DISORDER): ICD-10-CM

## 2024-03-08 NOTE — NURSING NOTE
Unable to complete any rooming or QRNS due to time restraint.      Is the patient currently in the state of [place]? YES    Visit mode:VIDEO    If the visit is dropped, the patient can be reconnected by: VIDEO VISIT: Text to cell phone:   Telephone Information:   Mobile [phone]       Will anyone else be joining the visit? NO  (If patient encounters technical issues they should call [phone])    How would you like to obtain your AVS? MyChart    Are changes needed to the allergy or medication list?  Unable tot review due to time restraint.     Reason for visit: EVER ANTOINEF

## 2024-03-08 NOTE — PROGRESS NOTES
Virtual Visit Details    Type of service:  Video Visit     Originating Location (pt. Location): Home  {PROVIDER LOCATION On-site should be selected for visits conducted from your clinic location or adjoining [facility] hospital, academic office, or other nearby [facility] building. Off-site should be selected for all other provider locations, including home:[id number]}  Distant Location (provider location):  On-site  Platform used for Video Visit: Paras Harden looking for job      on file for this patient.           Medications     Current Outpatient Medications   Medication Sig Dispense Refill    benztropine (COGENTIN) 1 MG tablet Take 1 tablet (1 mg) by mouth daily 30 tablet 4    paliperidone (INVEGA SUSTENNA) 117 MG/0.75ML injection Inject 0.75 mLs (117 mg) into the muscle every 28 days. 0.75 mL 2             Vitals     There were no vitals taken for this visit.          Mental Status Exam     Alertness: alert  and oriented  Appearance: well groomed  Behavior/Demeanor: pleasant and calm, with fair  eye contact   Speech: normal; more talkative than at previous appointments  Language: no obvious problem  Psychomotor: normal or unremarkable  Mood:  OK  Affect: blunted; was congruent to mood; was congruent to content  Thought Process/Associations: unremarkable  Thought Content:  Reports none;  Denies suicidal and violent ideation and delusions  Perception:  Reports none;  Denies auditory hallucinations and visual hallucinations  Insight:  difficult to assess as her memory of certain events is limited  Judgment: good, adequate for safety  Cognition: does  appear grossly intact; formal cognitive testing was not done         Labs and Data     RATING SCALES: AIMS due    PHQ9 TODAY =       5/17/2024    10:31 AM 6/14/2024     8:11 AM 7/19/2024     9:30 AM   PHQ-9 SCORE   PHQ-9 Total Score 1 3 0       ANTIPSYCHOTIC LABS ROUTINE    [glu, A1C, lipids (focus LDL), liver enzymes, WBC, ANEU, Hgb, plts]   q12 mo  No lab results found.  No lab results found.  No lab results found.  No lab results found.    Narrative & Impression   EXAM: MR BRAIN W/O and W CONTRAST  LOCATION: [facility]  DATE: 8/22/2023     INDICATION:  Psychosis, unspecified psychosis type (H)  COMPARISON: None.  CONTRAST: 7ml Gadavist  TECHNIQUE: Routine multiplanar multisequence head MRI without and with intravenous contrast.     FINDINGS:  INTRACRANIAL CONTENTS: No acute or subacute infarct. No mass, acute  hemorrhage, or extra-axial fluid collections. Normal brain parenchymal signal. Normal ventricles and sulci. Normal position of the cerebellar tonsils. No pathologic contrast enhancement.   Posterior fossa structures including cerebellar hemispheres, fourth ventricle and CP angle cisterns are clear. Nasopharynx is clear. Region of the sella and suprasellar cistern are clear.     SELLA: No abnormality accounting for technique.     OSSEOUS STRUCTURES/SOFT TISSUES: Normal marrow signal. The major intracranial vascular flow voids are maintained.      ORBITS: No abnormality accounting for technique.      SINUSES/MASTOIDS: No paranasal sinus mucosal disease. No middle ear or mastoid effusion.                                                                    IMPRESSION:  1.  Normal head MRI.            Psychiatric Diagnoses     Schizophreniform disorder, with positive prognostic features  H/o catatonia  Likely PTSD         Assessment     From intake, 8/4/23: Laura Webb is a 32 year old  Black or  Not  or  female who presented for a comprehensive assessment of psychiatric symptoms by the First Episode of Psychosis Navigate Program. Diagnostically challenging given single-episode of mental illness. Differential is broad including psychosis due to medical condition (seizure), bipolar disorder, MDD single episode with psychotic features. Substances do not appear to be a contributing factor. Schizophrenia also considered, though unlikely given rapid-onset of her symptoms and high cognitive and psychosocial functioning following her first episode / hospitalization. Her chart review and interview today present as most consistent with schizophreniform disorder. Prognosis is fair-to-good. Rapid onset of symptoms is positive predictive factor for resolution of symptoms / return to baseline.   We discussed this assessment with her today. We discussed our recommendations: to continue current  medications, complete her medical / neurological workup to rule-out underlying epileptic and autoimmune etiologies. Recommend continuing long-acting-injectable for now, ideally for 1 year following resolution of her psychotic symptoms. She expressed understanding of this plan.      TODAY Laura denies any symptoms of psychosis, low mood, or eduardo, but continues to have panic attacks, especially in the mornings. Continues to have nightmares and hypervigilance but not interested in medications. I expressed that I am available to talk about medications for PTSD which I believe she has if she is interested in the future, and she voiced her understanding.    PSYCHOTROPIC DRUG INTERACTIONS:   None.  MANAGEMENT:  N/A         Plan   1) Medications   - continue Invega Sustenna 156 mg IM q28 days (gets this at [facility])  - continue benztropine 0.5 mg BID      2) Therapy-  weekly IRT      3) Next Due   Labs- remaining first episode labs ordered 08/10/23, still pending; now due for AP monitoring labs  EKG- last completed 4/20/23, repeat if increasing or adding Qtc- prolonging medications   Rating scales- AIMS: annually     4) Referrals  Medical concerns- seizure history, neurology referral for EEG declined by patient       5) RTC: 1 month     6) Crisis Numbers:   Provided routinely in AVS     After hours:  [phone]    TREATMENT RISK STATEMENT:  The risks, benefits, alternatives and potential adverse effects have been discussed and are understood by the pt. The pt understands the risks of using street drugs or alcohol. There are no medical contraindications, the pt agrees to treatment with the ability to do so. The pt knows to call the clinic for any problems or to access emergency care if needed.  Medical and substance use concerns are documented above.  Psychotropic drug interaction check was done, including changes made today.    PROVIDER:  Yaneth Guzmán MD     The longitudinal plan of care for the  diagnosis(es)/condition(s) as documented were addressed during this visit. Due to the added complexity in care, I will continue to support Laura in the subsequent management and with ongoing continuity of care.

## 2024-03-12 ENCOUNTER — VIRTUAL VISIT (OUTPATIENT)
Dept: PSYCHIATRY | Facility: CLINIC | Age: 34
End: 2024-03-12
Payer: COMMERCIAL

## 2024-03-12 DIAGNOSIS — F29 PSYCHOSIS, UNSPECIFIED PSYCHOSIS TYPE (H): Primary | ICD-10-CM

## 2024-03-13 NOTE — PROGRESS NOTES
NAVIGJESUS Clinician Contact & Progress Note  For Individual Resiliency Training (IRT)  A Part of the [facility] First Episode of Psychosis Program    NAVIGATE Enrollee: Laura Webb (1990)     MRN: [id number]  Date:  3/12/24  Diagnosis:Psychosis, unspecified  Clinician: MILAN Individual Resiliency Trainer, LORELEI Carrera     1. Type of contact: (majority of time spent)  IRT Session via telehealth  Mode of communication: [employer] Well (HIPAA compliant, secure platform). Patient consented verbally to this mode of therapy today.  Reason for telehealth: COVID-19. This patient visit was converted to a telehealth visit to minimize exposure to COVID-19.    2. People present:   Writer  Client: Yes    3. Length of Actual Contact: Start Time: 2:09; End Time: 2:57     4. Location of contact:  Originating Location (patient location): family home, located in [place], [place]  Distant Location (provider location): Home office, located in [place], [place], using appropriate privacy considerations and procedures    5. Did the client complete the home practice option(s) from the previous session: Completed    6. Motivational Teaching Strategies:  Connect info and skills with personal goals  Promote hope and positive expectations    7. Educational Teaching Strategies:  Review of written material/education  Relate information to client's experience  Ask questions to check comprehension  Break down information into small chunks    8. CBT Teaching Strategies:  Reinforcement and shaping (positive feedback for steps towards goals, gains in knowledge & skills and follow-through on home assignments)    9. IRT Module(s) Addressed:  Module 6 - Processing the episode    10. Techniques utilized:   [place] announced at beginning of session  Review of homework  Review of previous meeting  Present new material  Summarize progress made in current session  Other techniques (please specify):   -build rapport by discussing  "preferences, previous experiences of maribell, etc.  -Clarified patient's feelings and perspectives  -Elicited more details about current and recent circumstances  -Emotional support via active and reflective listening, responding to feelings etc.  -Identified patient's strengths/needs  -Identified goals for treatment plan  -Normalized and validated feelings and experiences  -Provided verbal overview of First Episode Program NAVIGATE services  -Provided positive feedback and encouragement  -Provided psychoeducation related to psychosis and related concerns.       11. Measures:    Mental Status Exam  Alertness: alert  and oriented  Behavior/Demeanor: cooperative, pleasant and calm  Speech: normal  Language: intact.   Mood: \"good\"  Thought Process/Associations: unremarkable  Thought Content:  Reports none;  Denies suicidal ideation and delusions  Perception:  Reports none;  Denies auditory hallucinations and visual hallucinations  Insight: adequate  Judgment: good and adequate for safety  Cognition: does  appear grossly intact; formal cognitive testing was not done    EMEKA-7  Not completed. Pt denied anxiety symptoms when asked.     PHQ-9  Not completed. Pt denied depressive symptoms when asked.     [place] Protocol Risk Identification  Pt denied SI when asked.     12. Assessment/Progress Note:     Writer and client met for IRT visit via zoom. Set agenda to check in; continue to expand on IRT modules and material. During check in, client reported having had an okay week. Reported current symptoms to include: occasional lower mood and occasionally feeling overwhelmed and experiencing acute anxiety.  Current stressors include: Pt occasionally has worries that her symptoms of psychosis may return, especially when she is alone and often in the mornings. Reported previously that she worries about this maybe a few times a week.  In regards to medications, client reports:no current concnerns. Reports no issues regarding sleep " "beyond noting that she seems to be tired and go to sleep earlier than she used to, but still sleeps well and now wakes earlier than she used to. Substance use: NA. Assessed safety. Client denies SI, denies intent, and denies plan. HI was not present. SIB was not present.Safety plan has been created in the past. Safety plan was not reviewed today and includes speaking with , speaking with brother or other family, calling crisis lines, going to the nearest ED if SI/HI/SIB becomes overwhelming, worsens and/or becomes active.     Client did identify concern to be addressed today. Particularly, client wanted to discuss the worry she has been feelign in the mornings this past week related to previous episode of psychosis. Writer and client reviewed events since last visit, symptoms, medication. No change reported for medications. Laura denied safety concerns. Laura denies psychosis symptoms, denies panic symptoms, reports that the emotional blunting is lessened since previous weeks. Laura discussed the previous week and updates, denied any stressors. Laura reports that her sleep is consistent and sufficient for rest, but earlier than she would prefer.     Today's visit:  Laura stated \"I've had better weeks\" and expressed having had a very difficult week. Upon further discussion, Laura identified that every morning, [place]in has woken and experienced significant worry and fear related to not being able to control her life and worry that she may have another episode of psychosis. With questions and prompting, Laura denied having had anxiety attacks and but did endorse fear and worry as the overwhelming emotions related to memories of her episode of psychosis and fears of a reoccurrence. Laura also discuss emotions related to the support or lack of support she received from people during the episode. Laura and writer explored actions she might take and requests she might make of her support system to see if anything felt " "like if could be helpful to her. Laura noted that she experiences these feelings in the morning and as the day goies on she feels better, and by night time things are \"mostly smooth.\"  Laura ultimately agreed that the working with this writer to process the episode would be something that she is open to, and we will continue to discuss at next visit.     Laura has reported that she has had multiple traumatic experiences in her past, and did not want to discuss these events in any detail.     Writer used relational and interpersonal approach to explore feelings, motivations, and behavior. Writer offered support, feedback, validation, and reinforced use of skills taught in IRT from modalities including cognitive behavioral therapy, psycho education, and skills training. Promoted understanding of their experiences of psychosis and how it impacts them in important areas of their life and in recovery goals. Reflected on client's strengths and resiliency factors and facilitated discussion on how these can assist in symptom management, recovery, and well-being.     Home practice identified as: use a cold pack or other coping strategies the next time you have a flare up of anxiety about symptoms returning.     13. Plan/Referrals:     Next visit scheduled for one week from today.     Billing for \"Interactive Complexity\"?    No    LORELEI Carrera Individual Resiliency Trainer  "

## 2024-03-14 ENCOUNTER — TELEPHONE (OUTPATIENT)
Dept: PSYCHIATRY | Facility: CLINIC | Age: 34
End: 2024-03-14

## 2024-03-19 ENCOUNTER — VIRTUAL VISIT (OUTPATIENT)
Dept: PSYCHIATRY | Facility: CLINIC | Age: 34
End: 2024-03-19
Payer: COMMERCIAL

## 2024-03-19 DIAGNOSIS — F29 PSYCHOSIS, UNSPECIFIED PSYCHOSIS TYPE (H): Primary | ICD-10-CM

## 2024-03-20 NOTE — PROGRESS NOTES
NAVIGJESUS Clinician Contact & Progress Note  For Individual Resiliency Training (IRT)  A Part of the [facility] First Episode of Psychosis Program    NAVIGATE Enrollee: Laura Webb (1990)     MRN: [id number]  Date:  3/19/24  Diagnosis:Psychosis, unspecified  Clinician: MILAN Individual Resiliency Trainer, LORELEI Carrera     1. Type of contact: (majority of time spent)  IRT Session via telehealth  Mode of communication: [employer] Well (HIPAA compliant, secure platform). Patient consented verbally to this mode of therapy today.  Reason for telehealth: COVID-19. This patient visit was converted to a telehealth visit to minimize exposure to COVID-19.    2. People present:   Writer  Client: Yes    3. Length of Actual Contact: Start Time: 2:19; End Time: 2:55     4. Location of contact:  Originating Location (patient location): family home, located in [place], [place]  Distant Location (provider location): Home office, located in [place], [place], using appropriate privacy considerations and procedures    5. Did the client complete the home practice option(s) from the previous session: Completed    6. Motivational Teaching Strategies:  Connect info and skills with personal goals  Promote hope and positive expectations    7. Educational Teaching Strategies:  Review of written material/education  Relate information to client's experience  Ask questions to check comprehension  Break down information into small chunks    8. CBT Teaching Strategies:  Reinforcement and shaping (positive feedback for steps towards goals, gains in knowledge & skills and follow-through on home assignments)    9. IRT Module(s) Addressed:  Module 6 - Processing the episode    10. Techniques utilized:   [place] announced at beginning of session  Review of homework  Review of previous meeting  Present new material  Summarize progress made in current session  Other techniques (please specify):   -build rapport by discussing  "preferences, previous experiences of maribell, etc.  -Clarified patient's feelings and perspectives  -Elicited more details about current and recent circumstances  -Emotional support via active and reflective listening, responding to feelings etc.  -Identified patient's strengths/needs  -Identified goals for treatment plan  -Normalized and validated feelings and experiences  -Provided verbal overview of First Episode Program NAVIGATE services  -Provided positive feedback and encouragement  -Provided psychoeducation related to psychosis and related concerns.       11. Measures:    Mental Status Exam  Alertness: alert  and oriented  Behavior/Demeanor: cooperative, pleasant and calm  Speech: normal  Language: intact.   Mood: \"good\"  Thought Process/Associations: unremarkable  Thought Content:  Reports none;  Denies suicidal ideation and delusions  Perception:  Reports none;  Denies auditory hallucinations and visual hallucinations  Insight: adequate  Judgment: good and adequate for safety  Cognition: does  appear grossly intact; formal cognitive testing was not done    EMEKA-7  Not completed. Pt denied anxiety symptoms when asked.     PHQ-9  Not completed. Pt denied depressive symptoms when asked.     [place] Protocol Risk Identification  Pt denied SI when asked.     12. Assessment/Progress Note:     Writer and client met for IRT visit via zoom. Client experienced technical issues and was delayed in joined the visit. Set agenda to check in; continue to expand on IRT modules and material. During check in, client reported having had a good week. Reported current symptoms to include: occasional lower mood and occasionally feeling overwhelmed and experiencing acute anxiety.  Current stressors include: Pt occasionally has worries that her symptoms of psychosis may return, especially when she is alone and often in the mornings. Reported previously that she worries about this maybe a few times a week.  In regards to medications, " client reports:no current concnerns. Reports no issues regarding sleep beyond noting that she seems to be tired and go to sleep earlier than she used to, but still sleeps well and now wakes earlier than she used to. Substance use: NA. Assessed safety. Client denies SI, denies intent, and denies plan. HI was not present. SIB was not present.Safety plan has been created in the past. Safety plan was not reviewed today and includes speaking with , speaking with brother or other family, calling crisis lines, going to the nearest ED if SI/HI/SIB becomes overwhelming, worsens and/or becomes active.     Client did not identify concern to be addressed today.     Today's visit:  Laura  expressed having had a good week. Upon further discussion, Laura denied psychosis, denied depressed mood, denied fear/anxiety attacks over the past week. 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 Writer sent Laura two links related to artistic endeavors that I thought she may have interest in. Regarding processing the episode, writer reiterated how many people can experience their episode as a trauma dn as such, have a trauma respond when thinking about or remembering the episode. Laura and writer reviewed her preferred coping strategies and writer encouraged Laura to actively use these strategies as we begin to talk about difficult topics. Laura reports that she shared this next module topic with her  and discussed an idea of writing about her episode. Laura and writer began to discuss processing the episode and the next step being reading other peoples experiences. We will continue to discuss at next visit.     Laura has  "reported that she has had multiple traumatic experiences in her past, and did not want to discuss these events in any detail.     Writer used relational and interpersonal approach to explore feelings, motivations, and behavior. Writer offered support, feedback, validation, and reinforced use of skills taught in IRT from modalities including cognitive behavioral therapy, psycho education, and skills training. Promoted understanding of their experiences of psychosis and how it impacts them in important areas of their life and in recovery goals. Reflected on client's strengths and resiliency factors and facilitated discussion on how these can assist in symptom management, recovery, and well-being.     Home practice identified as: use a cold pack or other coping strategies the next time you have a flare up of anxiety about symptoms returning.     Email sent to Laura on 3/20:  Aurelio Sanchez,     I attached a PDF of the story that is shared in the NAVIGATE module. If you read that and find you want to read more people s stories or watch videos made by a woman who has schizoaffective disorder, see the links below:    Article about a woman who experienced psychosis and how her camila guided her recovery:  https://www.[employer]/features/losing-touch-with-reality-a-scary-ysltcpwis-kqmqucyvqz-zpexk-my-camila-but-recovery-is-possible/38685.article    Link to page with multiple recovery stories (various):  https://www.psychosisnet.com/help-and-recovery/recovery-stories/     Link to a YouTube channel of a woman who experiences psychosis who makes videos about her experiences:  [email or web address]/videos     Warmest regards,   KVNG Carrera LICSW     13. Plan/Referrals:     Next visit scheduled for one week from today.     Billing for \"Interactive Complexity\"?    No    LORELEI Carrera    NAVIGJESUS Individual Resiliency Trainer  "

## 2024-03-29 ENCOUNTER — VIRTUAL VISIT (OUTPATIENT)
Dept: PSYCHIATRY | Facility: CLINIC | Age: 34
End: 2024-03-29
Payer: COMMERCIAL

## 2024-03-29 DIAGNOSIS — F29 PSYCHOSIS, UNSPECIFIED PSYCHOSIS TYPE (H): Primary | ICD-10-CM

## 2024-04-01 NOTE — PROGRESS NOTES
NAVIGJESUS Clinician Contact & Progress Note  For Individual Resiliency Training (IRT)  A Part of the [facility] First Episode of Psychosis Program    NAVIGATE Enrollee: Laura Webb (1990)     MRN: [id number]  Date:  3/29/24  Diagnosis:Psychosis, unspecified  Clinician: MILAN Individual Resiliency Trainer, LORELEI Carrera     1. Type of contact: (majority of time spent)  IRT Session via telehealth  Mode of communication: [employer] Well (HIPAA compliant, secure platform). Patient consented verbally to this mode of therapy today.  Reason for telehealth: COVID-19. This patient visit was converted to a telehealth visit to minimize exposure to COVID-19.    2. People present:   Writer  Client: Yes    3. Length of Actual Contact: Start Time: 11:03; End Time: 12:00     4. Location of contact:  Originating Location (patient location): family home, located in [place], [place]  Distant Location (provider location): Home office, located in [place], [place], using appropriate privacy considerations and procedures    5. Did the client complete the home practice option(s) from the previous session: Completed    6. Motivational Teaching Strategies:  Connect info and skills with personal goals  Promote hope and positive expectations    7. Educational Teaching Strategies:  Review of written material/education  Relate information to client's experience  Ask questions to check comprehension  Break down information into small chunks    8. CBT Teaching Strategies:  Reinforcement and shaping (positive feedback for steps towards goals, gains in knowledge & skills and follow-through on home assignments)    9. IRT Module(s) Addressed:  Module 6 - Processing the episode    10. Techniques utilized:   [place] announced at beginning of session  Review of homework  Review of previous meeting  Present new material  Summarize progress made in current session  Other techniques (please specify):   -build rapport by discussing  "preferences, previous experiences of maribell, etc.  -Clarified patient's feelings and perspectives  -Elicited more details about current and recent circumstances  -Emotional support via active and reflective listening, responding to feelings etc.  -Identified patient's strengths/needs  -Identified goals for treatment plan  -Normalized and validated feelings and experiences  -Provided verbal overview of First Episode Program NAVIGATE services  -Provided positive feedback and encouragement  -Provided psychoeducation related to psychosis and related concerns.       11. Measures:    Mental Status Exam  Alertness: alert  and oriented  Behavior/Demeanor: cooperative, pleasant and calm  Speech: normal  Language: intact.   Mood: \"good\"  Thought Process/Associations: unremarkable  Thought Content:  Reports none;  Denies suicidal ideation and delusions  Perception:  Reports none;  Denies auditory hallucinations and visual hallucinations  Insight: adequate  Judgment: good and adequate for safety  Cognition: does  appear grossly intact; formal cognitive testing was not done    EMEKA-7  Not completed. Pt denied anxiety symptoms when asked.     PHQ-9  Not completed. Pt denied depressive symptoms when asked.     [place] Protocol Risk Identification  Pt denied SI when asked.     12. Assessment/Progress Note:     Writer and client met for IRT visit via zoom. Client experienced technical issues and was delayed in joined the visit. Set agenda to check in; continue to expand on IRT modules and material. Reported current symptoms to include: occasional lower mood and occasionally feeling overwhelmed and experiencing acute anxiety. Current stressors include: Pt occasionally has worries that her symptoms of psychosis may return, especially when she is alone and often in the mornings. Reported previously that she worries about this maybe a few times a week.  In regards to medications, client reports:no current concnerns. Reports no issues " regarding sleep beyond noting that she seems to be tired and go to sleep earlier than she used to, but still sleeps well and now wakes earlier than she used to. Substance use: NA. Assessed safety. Client denies SI, denies intent, and denies plan. HI was not present. SIB was not present.Safety plan has been created in the past. Safety plan was not reviewed today and includes speaking with , speaking with brother or other family, calling crisis lines, going to the nearest ED if SI/HI/SIB becomes overwhelming, worsens and/or becomes active.     Client did not identify concern to be addressed today.     Today's visit:  Laura expressed having had some challenges with symptoms since last visit. Upon further discussion, Laura denied psychosis, denied depressed mood, denied fear/anxiety attacks over the past week. Laura reports on-going worries and concerns about the previous episode and intrusive thoughts about whether another episode will occur. Regarding processing the episode, writer reiterated how many people can experience their episode as a trauma and as such, have a trauma respond when thinking about or remembering the episode. Laura and  have previously reviewed her preferred coping strategies and writer encouraged Laura to actively use these strategies as we begin to talk about difficult topics. Laura reports that she shared this next module topic with her  and discussed an idea of writing about her episode. Laura and  began to discuss processing the episode discussed the symptoms she experienced during the episode. Writer provided validation and active listening. and Laura will discuss her treatment experience at the next visit. Thenthe next step being reading other peoples experiences. We will continue to discuss at next visit.     Kevinr reminded client of upcoming vacation, will not be able to meet on 4/9, 4/23, or 4/30. Will continue to discuss at next visit.      used  "relational and interpersonal approach to explore feelings, motivations, and behavior. Writer offered support, feedback, validation, and reinforced use of skills taught in IRT from modalities including cognitive behavioral therapy, psycho education, and skills training. Promoted understanding of their experiences of psychosis and how it impacts them in important areas of their life and in recovery goals. Reflected on client's strengths and resiliency factors and facilitated discussion on how these can assist in symptom management, recovery, and well-being.     Home practice identified as: use a cold pack or other coping strategies the next time you have a flare up of anxiety about symptoms returning.     Email sent to Laura on 3/20, brenda on 3/29:  Aurelio Sanchez,     I attached a PDF of the story that is shared in the NAVIGATE module. If you read that and find you want to read more people s stories or watch videos made by a woman who has schizoaffective disorder, see the links below:    Article about a woman who experienced psychosis and how her camila guided her recovery:  https://www.[employer]/features/losing-touch-with-reality-a-scary-bujjlfsaa-vfeqrihycr-jadwg-my-camila-but-recovery-is-possible/97978.article    Link to page with multiple recovery stories (various):  https://www.psychosisnet.com/help-and-recovery/recovery-stories/     Link to a YouTube channel of a woman who experiences psychosis who makes videos about her experiences:  [email or web address].[email or web address]/videos     Warmest regards,   KVNG Carrera LICSW     13. Plan/Referrals:     Next visit scheduled for 4/2 at 2:00.     Billing for \"Interactive Complexity\"?    No    LORELEI Carrera Individual Resiliency Trainer  "

## 2024-04-02 ENCOUNTER — VIRTUAL VISIT (OUTPATIENT)
Dept: PSYCHIATRY | Facility: CLINIC | Age: 34
End: 2024-04-02
Payer: COMMERCIAL

## 2024-04-02 DIAGNOSIS — F29 PSYCHOSIS, UNSPECIFIED PSYCHOSIS TYPE (H): Primary | ICD-10-CM

## 2024-04-02 NOTE — PROGRESS NOTES
NAVIGJESUS Clinician Contact & Progress Note  For Individual Resiliency Training (IRT)  A Part of the [facility] First Episode of Psychosis Program    NAVIGATE Enrollee: Laura Webb (1990)     MRN: [id number]  Date:  4/02/24  Diagnosis:Psychosis, unspecified  Clinician: MILAN Individual Resiliency Trainer, LORELEI Carrera     1. Type of contact: (majority of time spent)  IRT Session via telehealth  Mode of communication: [employer] (HIPAA compliant, secure platform). Patient consented verbally to this mode of therapy today.  Reason for telehealth: COVID-19. This patient visit was converted to a telehealth visit to minimize exposure to COVID-19.    2. People present:   Writer  Client: Yes    3. Length of Actual Contact: Start Time: 2:06; End Time: 2:55     4. Location of contact:  Originating Location (patient location): family home, located in [place], [place]  Distant Location (provider location): Home office, located in [place], [place], using appropriate privacy considerations and procedures    5. Did the client complete the home practice option(s) from the previous session: Completed    6. Motivational Teaching Strategies:  Connect info and skills with personal goals  Promote hope and positive expectations    7. Educational Teaching Strategies:  Review of written material/education  Relate information to client's experience  Ask questions to check comprehension  Break down information into small chunks    8. CBT Teaching Strategies:  Reinforcement and shaping (positive feedback for steps towards goals, gains in knowledge & skills and follow-through on home assignments)    9. IRT Module(s) Addressed:  Module 6 - Processing the episode    10. Techniques utilized:   Hematite announced at beginning of session  Review of homework  Review of previous meeting  Present new material  Summarize progress made in current session  Other techniques (please specify):   -build rapport by discussing  "preferences, previous experiences of maribell, etc.  -Clarified patient's feelings and perspectives  -Elicited more details about current and recent circumstances  -Emotional support via active and reflective listening, responding to feelings etc.  -Identified patient's strengths/needs  -Identified goals for treatment plan  -Normalized and validated feelings and experiences  -Provided verbal overview of First Episode Program NAVIGATE services  -Provided positive feedback and encouragement  -Provided psychoeducation related to psychosis and related concerns.       11. Measures:    Mental Status Exam  Alertness: alert  and oriented  Behavior/Demeanor: cooperative, pleasant and calm  Speech: normal  Language: intact.   Mood: \"good\"  Thought Process/Associations: unremarkable  Thought Content:  Reports none;  Denies suicidal ideation and delusions  Perception:  Reports none;  Denies auditory hallucinations and visual hallucinations  Insight: adequate  Judgment: good and adequate for safety  Cognition: does  appear grossly intact; formal cognitive testing was not done    EMEKA-7  Not completed. Pt denied anxiety symptoms when asked.     PHQ-9  Not completed. Pt denied depressive symptoms when asked.     [place] Protocol Risk Identification  Pt denied SI when asked.     12. Assessment/Progress Note:     Writer and client met for IRT visit via zoom. Client experienced technical issues and was delayed in joined the visit. Set agenda to check in; continue to expand on IRT modules and material. Reported current symptoms to include: occasional lower mood and occasionally feeling overwhelmed and experiencing acute anxiety. Current stressors include: Pt occasionally has worries that her symptoms of psychosis may return, especially when she is alone and often in the mornings. Reported previously that she worries about this maybe a few times a week.  In regards to medications, client reports:no current concnerns. Reports no issues " regarding sleep beyond noting that she seems to be tired and go to sleep earlier than she used to, but still sleeps well and now wakes earlier than she used to. Substance use: NA. Assessed safety. Client denies SI, denies intent, and denies plan. HI was not present. SIB was not present.Safety plan has been created in the past. Safety plan was not reviewed today and includes speaking with , speaking with brother or other family, calling crisis lines, going to the nearest ED if SI/HI/SIB becomes overwhelming, worsens and/or becomes active.     Client did not identify concern to be addressed today.     Today's visit:   Laura expressed having had some challenges with symptoms since last visit. Upon further discussion, Laura denied psychosis, denied depressed mood, denied fear/anxiety attacks over the past week. Laura reports on-going worries and concerns about the previous episode and intrusive thoughts about whether another episode will occur.When introducing processing the episode, writer reiterated how many people can experience their episode as a trauma and as such, have a trauma respond when thinking about or remembering the episode. Laura and  have previously reviewed her preferred coping strategies and writer encouraged Laura to actively use these strategies as we begin to talk about difficult topics. Laura reports that she shared this next module topic with her  and discussed an idea of writing about her episode. Laura and  continued to discuss processing the episode discussed the symptoms she experienced during the episode and treatments as a result of having the episode.  and Laura reviewed common effects of experiencing an episode of psychosis.  Writer provided validation and active listening. Writer provided normalization of Laura's experiences and reactions.  and Laura will discuss self-stigmatizing thoughts and her reaction to reading about other poeple's expreinces at  the next visit. Thenthe next step being reading other peoples experiences. We will continue to discuss at next visit.     Writer reminded client of upcoming vacation, will not be able to meet on 4/9, 4/23, or 4/30. Discussed alternate days and times to visit during the latter two weeks.     Writer used relational and interpersonal approach to explore feelings, motivations, and behavior. Writer offered support, feedback, validation, and reinforced use of skills taught in IRT from modalities including cognitive behavioral therapy, psycho education, and skills training. Promoted understanding of their experiences of psychosis and how it impacts them in important areas of their life and in recovery goals. Reflected on client's strengths and resiliency factors and facilitated discussion on how these can assist in symptom management, recovery, and well-being.     Home practice identified as: use a cold pack or other coping strategies the next time you have a flare up of anxiety about symptoms returning.     Email sent to Laura on 3/20, resrehana on 3/29:  Hi Laura,     I attached a PDF of the story that is shared in the NAVIGATE module. If you read that and find you want to read more people s stories or watch videos made by a woman who has schizoaffective disorder, see the links below:    Article about a woman who experienced psychosis and how her camila guided her recovery:  https://www.[employer]/features/losing-touch-with-reality-a-scary-vsatqktqc-jtfwwpavbn-wlyxq-my-camila-but-recovery-is-possible/18456.article    Link to page with multiple recovery stories (various):  https://www.psychosisnet.com/help-and-recovery/recovery-stories/     Link to a [employer] channel of a woman who experiences psychosis who makes videos about her experiences:  https://www.[employer].com/c/PersonalwithSchizophrenia/videos     Davidest regards,   Park Rush, KVNG, JOESW     13. Plan/Referrals:     Next visit scheduled for 4/16.  "    Billing for \"Interactive Complexity\"?    No    [place], LORELEI    NAVIGATE Individual Resiliency Trainer  "

## 2024-04-05 ENCOUNTER — VIRTUAL VISIT (OUTPATIENT)
Dept: PSYCHIATRY | Facility: CLINIC | Age: 34
End: 2024-04-05
Payer: COMMERCIAL

## 2024-04-05 VITALS — HEIGHT: 63 IN | WEIGHT: 125 LBS | BODY MASS INDEX: 22.15 KG/M2

## 2024-04-05 DIAGNOSIS — F29 PSYCHOSIS, UNSPECIFIED PSYCHOSIS TYPE (H): ICD-10-CM

## 2024-04-05 DIAGNOSIS — F43.10 PTSD (POST-TRAUMATIC STRESS DISORDER): ICD-10-CM

## 2024-04-05 RX ORDER — BENZTROPINE MESYLATE 1 MG/1
1 TABLET ORAL DAILY
Qty: 30 TABLET | Refills: 1 | Status: SHIPPED | OUTPATIENT
Start: 2024-04-05 | End: 2024-06-14

## 2024-04-05 ASSESSMENT — PAIN SCALES - GENERAL: PAINLEVEL: NO PAIN (0)

## 2024-04-05 ASSESSMENT — PATIENT HEALTH QUESTIONNAIRE - PHQ9: SUM OF ALL RESPONSES TO PHQ QUESTIONS 1-9: 2

## 2024-04-05 NOTE — PROGRESS NOTES
"Virtual Visit Details    Type of service:  Video Visit   Video Start Time: {video visit start/end time for provider to select:754121}  Video End Time:{video visit start/end time for provider to select:537333}    Originating Location (pt. Location): {video visit patient location:074016::\"Home\"}  {PROVIDER LOCATION On-site should be selected for visits conducted from your clinic location or adjoining [facility] hospital, academic office, or other nearby [facility] building. Off-site should be selected for all other provider locations, including home:791311}  Distant Location (provider location):  {virtual location provider:802396}  Platform used for Video Visit: {Virtual Visit Platforms:080193::\"[employer]\"}  "

## 2024-04-05 NOTE — PROGRESS NOTES
-She is worried about the medication--feeling more slowed  -Instantly right when she takes the injection she is noticing being slower  -No times where she has gotten stuck  -no trouble talking. Initially when she was on it she couldn't talk  -taking benztropine doesn't seem to have any effect on it  -has fears of what happened, doesn't want it to happen again  -no body pain, muscle stiffness    -Cogentin: Increase to 1 mg           "substance    There is no problem list on file for this patient.           Medications     Current Outpatient Medications   Medication Sig Dispense Refill    benztropine (COGENTIN) 1 MG tablet Take 1 tablet (1 mg) by mouth daily 30 tablet 4    paliperidone (INVEGA SUSTENNA) 117 MG/0.75ML injection Inject 0.75 mLs (117 mg) into the muscle every 28 days. 0.75 mL 2             Vitals     Ht 1.6 m (5' 3\")   Wt 56.7 kg (125 lb)   BMI 22.14 kg/m            Mental Status Exam     Alertness: alert  and oriented  Appearance: well groomed  Behavior/Demeanor: pleasant and calm, with fair  eye contact   Speech: normal; more talkative than at previous appointments  Language: no obvious problem  Psychomotor: normal or unremarkable  Mood:  OK  Affect: blunted; was congruent to mood; was congruent to content  Thought Process/Associations: unremarkable  Thought Content:  Reports none;  Denies suicidal and violent ideation and delusions  Perception:  Reports none;  Denies auditory hallucinations and visual hallucinations  Insight:  difficult to assess as her memory of certain events is limited  Judgment: good, adequate for safety  Cognition: does  appear grossly intact; formal cognitive testing was not done         Labs and Data     RATING SCALES: AIMS due    PHQ9 TODAY =       5/17/2024    10:31 AM 6/14/2024     8:11 AM 7/19/2024     9:30 AM   PHQ-9 SCORE   PHQ-9 Total Score 1 3 0       ANTIPSYCHOTIC LABS ROUTINE    [glu, A1C, lipids (focus LDL), liver enzymes, WBC, ANEU, Hgb, plts]   q12 mo  No lab results found.  No lab results found.  No lab results found.  No lab results found.    Narrative & Impression   EXAM: MR BRAIN W/O and W CONTRAST  LOCATION: [facility]  DATE: 8/22/2023     INDICATION:  Psychosis, unspecified psychosis type (H)  COMPARISON: None.  CONTRAST: 7ml Gadavist  TECHNIQUE: Routine multiplanar multisequence head MRI without and with intravenous contrast.     FINDINGS:  INTRACRANIAL " CONTENTS: No acute or subacute infarct. No mass, acute hemorrhage, or extra-axial fluid collections. Normal brain parenchymal signal. Normal ventricles and sulci. Normal position of the cerebellar tonsils. No pathologic contrast enhancement.   Posterior fossa structures including cerebellar hemispheres, fourth ventricle and CP angle cisterns are clear. Nasopharynx is clear. Region of the sella and suprasellar cistern are clear.     SELLA: No abnormality accounting for technique.     OSSEOUS STRUCTURES/SOFT TISSUES: Normal marrow signal. The major intracranial vascular flow voids are maintained.      ORBITS: No abnormality accounting for technique.      SINUSES/MASTOIDS: No paranasal sinus mucosal disease. No middle ear or mastoid effusion.                                                                    IMPRESSION:  1.  Normal head MRI.            Psychiatric Diagnoses     Schizophreniform disorder, with positive prognostic features  H/o catatonia  Likely PTSD         Assessment     From intake, 8/4/23: Laura Webb is a 32 year old  Black or  Not  or  female who presented for a comprehensive assessment of psychiatric symptoms by the First Episode of Psychosis Navigate Program. Diagnostically challenging given single-episode of mental illness. Differential is broad including psychosis due to medical condition (seizure), bipolar disorder, MDD single episode with psychotic features. Substances do not appear to be a contributing factor. Schizophrenia also considered, though unlikely given rapid-onset of her symptoms and high cognitive and psychosocial functioning following her first episode / hospitalization. Her chart review and interview today present as most consistent with schizophreniform disorder. Prognosis is fair-to-good. Rapid onset of symptoms is positive predictive factor for resolution of symptoms / return to baseline.   We discussed this assessment with her today.  "We discussed our recommendations: to continue current medications, complete her medical / neurological workup to rule-out underlying epileptic and autoimmune etiologies. Recommend continuing long-acting-injectable for now, ideally for 1 year following resolution of her psychotic symptoms. She expressed understanding of this plan.      TODAY Laura denies any symptoms of psychosis, low mood, or eduardo, but does express concerns about the medication causing her to feel immediately \"slower.\" With regard to both moving and speaking. The benztropine does not seems to have an effect on this. I expressed my concern that this could be the re-emergence of some catatonia symptoms and asked her to come in person to the next visit. Nearly immediately she said that she did not think it could be catatonia and that it was \"fine\" and she would continue on the same dose of the injection. I recommend taking 1 mg of benztropine immediately before the injection, in case it is more of an acute EPSE side effect. At our next visit, we will discuss whether we want to decrease the Invega dose or need to add a benzodiazepine.    I am concerned that she is motivated to minimize symptoms in order to minimize the number of medications/invasiveness of medications/time she spends in contact with psychiatrists as the whole system is triggering PTSD symptoms for her. I advised her that I would like her to come in person but that did not mean she had catatonia and that she would not likely need ECT if that were the case, in fact that if we intervene early the course could be very mild. She expressed her understanding.    PSYCHOTROPIC DRUG INTERACTIONS:   None.  MANAGEMENT:  N/A         Plan   1) Medications   - continue Invega Sustenna 156 mg IM q28 days (gets this at [facility])  - continue benztropine 1 mg PO daily      2) Therapy-  weekly IRT      3) Next Due   Labs- remaining first episode labs ordered 08/10/23, still pending; now due for AP " monitoring labs  EKG- last completed 4/20/23, repeat if increasing or adding Qtc- prolonging medications   Rating scales- AIMS: annually     4) Referrals  Medical concerns- seizure history, neurology referral for EEG declined by patient       5) RTC: 1 month     6) Crisis Numbers:   Provided routinely in AVS     After hours:  [phone]    TREATMENT RISK STATEMENT:  The risks, benefits, alternatives and potential adverse effects have been discussed and are understood by the pt. The pt understands the risks of using street drugs or alcohol. There are no medical contraindications, the pt agrees to treatment with the ability to do so. The pt knows to call the clinic for any problems or to access emergency care if needed.  Medical and substance use concerns are documented above.  Psychotropic drug interaction check was done, including changes made today.    PROVIDER:  Yaneth Guzmán MD     The longitudinal plan of care for the diagnosis(es)/condition(s) as documented were addressed during this visit. Due to the added complexity in care, I will continue to support Laura in the subsequent management and with ongoing continuity of care.

## 2024-04-05 NOTE — NURSING NOTE
Is the patient currently in the state of [place]? YES    Visit mode:VIDEO    If the visit is dropped, the patient can be reconnected by: VIDEO VISIT: Text to cell phone:   Telephone Information:   Mobile [phone]       Will anyone else be joining the visit? NO  (If patient encounters technical issues they should call [phone] :[phone])    How would you like to obtain your AVS? MyChart    Are changes needed to the allergy or medication list? No    Reason for visit: RECHECK    Medications and allergies have been reviewed.      Kaden Zhu VVGABY    NAVIGATE Patient Self-Rating Form    Since your last medication management visit--    Have you been feeling depressed, sad, or down? No  Have you been feeling anxious, worried or nervous? No  Have you been thinking about death or have you had any feelings that you would be better off dead? No   Have you been feeling particularly good? Yes  Have you been feeling annoyed, angry, or resentful (whether you showed it or not)? No  Did you do anything that could have gotten you in trouble? No  Have you felt dizzy or faint? No  Have you had blurred vision? No  Have you had dry mouth? Yes  Have you had too much saliva in your mouth or had drooling? No  Have you felt nauseous? Yes  Have you been constipated? No  Has you appetite for food been increased? No  Have you gained weight? No  Have you lost weight? No  Have you felt restless or like you cannot sit still? Yes  Any shaking of your hands, legs, or other muscles? No  Any problems walking or moving or any problems feeling stiff or rigid? No  Have you felt tired or fatigued? Yes  Have you felt drowsy during the day? Yes  Have you been sleeping too much at night? No  Have you been sleeping too little or had problems sleeping at night?  A little bit  Any decrease in your interest in sex? No  Any other problems with sex? No  Any problems with your breasts such as swelling or discharge? No  For women, any problems with your period?  Yes  Are there other medical or side effect problems you wish to discuss with your prescriber? No  Since your last visit, how many days have you not taken your medication? 0  Have you had trouble remembering to take your medication? No  Do you find the number of medicines or the times when you are supposed to take then confusing or burdensome? No  Are you afraid of the medication? No  Do you think that you have an illness that requires taking medication? Yes  Do you think that other people would think poorly of you if they knew that you take medication? No  On average, how many cigarettes do you smoke per day? 0   Since you last visit, did you drink alcohol? No  Since your last visit, have you used any marijuana? No  Since your last visit, have you used any stress drugs other than marijuana? No  Between now and your next visit, do you think we should keep your medication the same or consider changing the medications? Consider changing medication

## 2024-04-11 ENCOUNTER — TELEPHONE (OUTPATIENT)
Dept: PSYCHIATRY | Facility: CLINIC | Age: 34
End: 2024-04-11

## 2024-04-16 ENCOUNTER — VIRTUAL VISIT (OUTPATIENT)
Dept: PSYCHIATRY | Facility: CLINIC | Age: 34
End: 2024-04-16
Payer: COMMERCIAL

## 2024-04-16 DIAGNOSIS — F29 PSYCHOSIS, UNSPECIFIED PSYCHOSIS TYPE (H): Primary | ICD-10-CM

## 2024-04-17 NOTE — PROGRESS NOTES
NAVIGJESUS Clinician Contact & Progress Note  For Individual Resiliency Training (IRT)  A Part of the [facility] First Episode of Psychosis Program    NAVIGATE Enrollee: Laura Webb (1990)     MRN: [id number]  Date:  4/16/24  Diagnosis:Psychosis, unspecified  Clinician: MILAN Individual Resiliency Trainer, LORELEI Carrera     1. Type of contact: (majority of time spent)  IRT Session via telehealth  Mode of communication: [employer] (HIPAA compliant, secure platform). Patient consented verbally to this mode of therapy today.  Reason for telehealth: COVID-19. This patient visit was converted to a telehealth visit to minimize exposure to COVID-19.    2. People present:   Writer  Client: Yes    3. Length of Actual Contact: Start Time: 2:08; End Time: 2:58     4. Location of contact:  Originating Location (patient location): family home, located in [place], [place]  Distant Location (provider location): Home office, located in [place], [place], using appropriate privacy considerations and procedures    5. Did the client complete the home practice option(s) from the previous session: Completed    6. Motivational Teaching Strategies:  Connect info and skills with personal goals  Promote hope and positive expectations    7. Educational Teaching Strategies:  Review of written material/education  Relate information to client's experience  Ask questions to check comprehension  Break down information into small chunks    8. CBT Teaching Strategies:  Reinforcement and shaping (positive feedback for steps towards goals, gains in knowledge & skills and follow-through on home assignments)    9. IRT Module(s) Addressed:  Module 6 - Processing the episode    10. Techniques utilized:   Lynn announced at beginning of session  Review of homework  Review of previous meeting  Present new material  Summarize progress made in current session  Other techniques (please specify):   -build rapport by discussing  "preferences, previous experiences of maribell, etc.  -Clarified patient's feelings and perspectives  -Elicited more details about current and recent circumstances  -Emotional support via active and reflective listening, responding to feelings etc.  -Identified patient's strengths/needs  -Identified goals for treatment plan  -Normalized and validated feelings and experiences  -Provided verbal overview of First Episode Program NAVIGATE services  -Provided positive feedback and encouragement  -Provided psychoeducation related to psychosis and related concerns.       11. Measures:    Mental Status Exam  Alertness: alert  and oriented  Behavior/Demeanor: cooperative, pleasant and calm  Speech: normal  Language: intact.   Mood: \"good\"  Thought Process/Associations: unremarkable  Thought Content:  Reports none;  Denies suicidal ideation and delusions  Perception:  Reports none;  Denies auditory hallucinations and visual hallucinations  Insight: adequate  Judgment: good and adequate for safety  Cognition: does  appear grossly intact; formal cognitive testing was not done    EMEKA-7  Not completed. Pt denied anxiety symptoms when asked.     PHQ-9  Not completed. Pt denied depressive symptoms when asked.     [place] Protocol Risk Identification  Pt denied SI when asked.     12. Assessment/Progress Note:     Writer and client met for IRT visit via zoom. Client experienced technical issues and was delayed in joined the visit. Set agenda to check in; continue to expand on IRT modules and material. Reported current symptoms to include: occasional lower mood and occasionally feeling overwhelmed and experiencing acute anxiety. Current stressors include: Pt occasionally has worries that her symptoms of psychosis may return, especially when she is alone and often in the mornings. Reported previously that she worries about this maybe a few times a week.  In regards to medications, client reports:no current concnerns. Reports no issues " "regarding sleep beyond noting that she seems to be tired and go to sleep earlier than she used to, but still sleeps well and now wakes earlier than she used to. Substance use: NA. Assessed safety. Client denies SI, denies intent, and denies plan. HI was not present. SIB was not present.Safety plan has been created in the past. Safety plan was not reviewed today and includes speaking with , speaking with brother or other family, calling crisis lines, going to the nearest ED if SI/HI/SIB becomes overwhelming, worsens and/or becomes active.     Client did not identify concern to be addressed today.     Today's visit:   Laura no symptoms since last visit. Upon further discussion, Laura denied psychosis, denied depressed mood, denied fear/anxiety attacks over the past week. Laura reports on-going worries and concerns about the previous episode and occasional intrusive thoughts about whether another episode will occur in the future. Regarding medication, Laura discussed feeling \"slowed down\" especially after getting DOVE injection, stated that she is actively working with Dr. Guzmán to assess and address. Reviewed the events of past two weeks. Discussed socialization with family. Discussed Laura's grieving process related to the loss of her father last year and the upcoming birthday of her father. Continued to discuss processing the episode, writer previously reiterated how many people can experience their episode as a trauma and as such, have a trauma respond when thinking about or remembering the episode. Laura and  have previously reviewed her preferred coping strategies and writer encouraged Laura to actively use these strategies as we talk about difficult topics. Laura and   discussed the symptoms she experienced during the episode and treatments as a result of having the episode.  and Laura reviewed common effects of experiencing an episode of psychosis.  Writer provided validation and " active listening. Writer provided normalization of Laura's experiences and reactions. Writer and Laura will discuss self-stigmatizing thoughts and her reaction to reading about other poeple's expreinces at the next visit. Then the next step being reading other peoples experiences. We will continue to discuss at next visit.     Writer reminded client of upcoming vacation, will not be able to meet on 4/23, or 4/30. Discussed alternate days and times to visit during the latter two weeks.     Writer used relational and interpersonal approach to explore feelings, motivations, and behavior. Writer offered support, feedback, validation, and reinforced use of skills taught in IRT from modalities including cognitive behavioral therapy, psycho education, and skills training. Promoted understanding of their experiences of psychosis and how it impacts them in important areas of their life and in recovery goals. Reflected on client's strengths and resiliency factors and facilitated discussion on how these can assist in symptom management, recovery, and well-being.     Home practice identified as: use a cold pack or other coping strategies the next time you have a flare up of anxiety about symptoms returning.     Email sent to Laura on 3/20, brenda on 3/29:  Hi Laura,     I attached a PDF of the story that is shared in the NAVIGATE module. If you read that and find you want to read more people s stories or watch videos made by a woman who has schizoaffective disorder, see the links below:    Article about a woman who experienced psychosis and how her camila guided her recovery:  https://www.Netatmo.MM[employer]/features/losing-touch-with-reality-a-scary-toaccohjo-swurnqatjx-ikwes-my-[email or web address]-recovery-is-possible/09579.article    Link to page with multiple recovery stories (various):  https://www.psychosisnet.com/help-and-recovery/recovery-stories/     Link to a YouTube channel of a woman who experiences psychosis who makes  "videos about her experiences:  [email or web address]     Warmest regards,   KVNG Carrera, LORELEI     13. Plan/Referrals:     Next visit scheduled for next week.     Billing for \"Interactive Complexity\"?    No    LORELEI Carrera    NAVIGATE Individual Resiliency Trainer  "

## 2024-05-02 ENCOUNTER — TELEPHONE (OUTPATIENT)
Dept: PSYCHIATRY | Facility: CLINIC | Age: 34
End: 2024-05-02

## 2024-05-02 ENCOUNTER — VIRTUAL VISIT (OUTPATIENT)
Dept: PSYCHIATRY | Facility: CLINIC | Age: 34
End: 2024-05-02
Payer: COMMERCIAL

## 2024-05-02 DIAGNOSIS — F29 PSYCHOSIS, UNSPECIFIED PSYCHOSIS TYPE (H): Primary | ICD-10-CM

## 2024-05-02 NOTE — PROGRESS NOTES
NAVIGJESUS Clinician Contact & Progress Note  For Individual Resiliency Training (IRT)  A Part of the [facility] First Episode of Psychosis Program    NAVIGATE Enrollee: Laura Webb (1990)     MRN: [id number]  Date:  5/02/24  Diagnosis:Psychosis, unspecified  Clinician: MILAN Individual Resiliency Trainer, LORELEI Carrera     1. Type of contact: (majority of time spent)  IRT Session via telehealth  Mode of communication: [employer] (HIPAA compliant, secure platform). Patient consented verbally to this mode of therapy today.  Reason for telehealth: COVID-19. This patient visit was converted to a telehealth visit to minimize exposure to COVID-19.    2. People present:   Writer  Client: Yes    3. Length of Actual Contact: Start Time: 10:06; End Time: 10:56     4. Location of contact:  Originating Location (patient location): family home, located in [place], [place]  Distant Location (provider location): Home office, located in [place], [place], using appropriate privacy considerations and procedures    5. Did the client complete the home practice option(s) from the previous session: Completed    6. Motivational Teaching Strategies:  Connect info and skills with personal goals  Promote hope and positive expectations    7. Educational Teaching Strategies:  Review of written material/education  Relate information to client's experience  Ask questions to check comprehension  Break down information into small chunks    8. CBT Teaching Strategies:  Reinforcement and shaping (positive feedback for steps towards goals, gains in knowledge & skills and follow-through on home assignments)    9. IRT Module(s) Addressed:  Module 6 - Processing the episode    10. Techniques utilized:   Mcdonald announced at beginning of session  Review of homework  Review of previous meeting  Present new material  Summarize progress made in current session  Other techniques (please specify):   -build rapport by discussing  "preferences, previous experiences of maribell, etc.  -Clarified patient's feelings and perspectives  -Elicited more details about current and recent circumstances  -Emotional support via active and reflective listening, responding to feelings etc.  -Identified patient's strengths/needs  -Identified goals for treatment plan  -Normalized and validated feelings and experiences  -Provided verbal overview of First Episode Program NAVIGATE services  -Provided positive feedback and encouragement  -Provided psychoeducation related to psychosis and related concerns.       11. Measures:    Mental Status Exam  Alertness: alert  and oriented  Behavior/Demeanor: cooperative, pleasant and calm  Speech: normal  Language: intact.   Mood: \"good\"  Thought Process/Associations: unremarkable  Thought Content:  Reports none;  Denies suicidal ideation and delusions  Perception:  Reports none;  Denies auditory hallucinations and visual hallucinations  Insight: adequate  Judgment: good and adequate for safety  Cognition: does  appear grossly intact; formal cognitive testing was not done    EMEKA-7  Not completed. Pt denied anxiety symptoms when asked.     PHQ-9  Not completed. Pt denied depressive symptoms when asked.     [place] Protocol Risk Identification  Pt denied SI when asked.     12. Assessment/Progress Note:     Writer and client met for IRT visit via zoom. Client experienced technical issues and was delayed in joined the visit. Set agenda to check in; continue to expand on IRT modules and material. Reported current symptoms to include: occasional lower mood and occasionally feeling overwhelmed and experiencing acute anxiety. Current stressors include: Pt occasionally has worries that her symptoms of psychosis may return, especially when she is alone and often in the mornings. Reported previously that she worries about this maybe a few times a week.  In regards to medications, client reports:no current concnerns. Reports no issues " "regarding sleep beyond noting that she seems to be tired and go to sleep earlier than she used to, but still sleeps well and now wakes earlier than she used to. Substance use: NA. Assessed safety. Client denies SI, denies intent, and denies plan. HI was not present. SIB was not present.Safety plan has been created in the past. Safety plan was not reviewed today and includes speaking with , speaking with brother or other family, calling crisis lines, going to the nearest ED if SI/HI/SIB becomes overwhelming, worsens and/or becomes active.     Client did not identify concern to be addressed today.     Today's visit:   Laura reports having had an okay two weeks. Upon further discussion, Laura denied psychosis, denied depressed mood, denied fear/anxiety attacks over the past weeks. Laura reports experiencing instances of having flashbacks of the episode, consisting of images of her experiences with the accompanying emotions she had during those experiences. They happen more often in the morning or when she is alone. She describes being able to cope with them but that the worries linger for some time after the flashback. Laura reports on-going worries and concerns about the previous episode and occasional intrusive thoughts about whether another episode will occur in the future. Regarding medication, Laura discussed feeling \"slowed down\" especially after getting DOVE injection for about a day, stated that she is actively working with Dr. Guzmán to assess and address. Reviewed the events of past two weeks. Discussed socialization with family. Discussed Laura's automatic self-stigmatizing thoughts and began to build more positive replacements for these stigmatizing thoughts.   Continued to discuss processing the episode, writer previously reiterated how many people can experience their episode as a trauma and as such, have a trauma respond when thinking about or remembering the episode. Laura and writer have " previously reviewed her preferred coping strategies and writer encouraged Laura to actively use these strategies as we talk about difficult topics. Laura and writer  discussed the symptoms she experienced during the episode and treatments as a result of having the episode. Writer and Laura reviewed common effects of experiencing an episode of psychosis.  Writer provided validation and active listening. Writer provided normalization of Laura's experiences and reactions.  We will continue to discuss at next visit.     Writer used relational and interpersonal approach to explore feelings, motivations, and behavior. Writer offered support, feedback, validation, and reinforced use of skills taught in IRT from modalities including cognitive behavioral therapy, psycho education, and skills training. Promoted understanding of their experiences of psychosis and how it impacts them in important areas of their life and in recovery goals. Reflected on client's strengths and resiliency factors and facilitated discussion on how these can assist in symptom management, recovery, and well-being.     Home practice identified as: use a cold pack or other coping strategies the next time you have a flare up of anxiety about symptoms returning.     Email sent to Laura on 3/20, resrehana on 3/29:  Hi Laura,     I attached a PDF of the story that is shared in the NAVIGATE module. If you read that and find you want to read more people s stories or watch videos made by a woman who has schizoaffective disorder, see the links below:    Article about a woman who experienced psychosis and how her camila guided her recovery:  https://[email or web address].[employer]/features/losing-touch-with-reality-a-scary-mvwyebttp-nkciixuegm-fhibs-my-camila-but-recovery-is-possible/96242.article    Link to page with multiple recovery stories (various):  https://www.psychosisnet.com/help-and-recovery/recovery-stories/     Link to a YouTube channel of a woman who  "experiences psychosis who makes videos about her experiences:  [email or web address]     Warmest regards,   KVNG Carrera, LORELEI     13. Plan/Referrals:     Next visit scheduled for next week.     Billing for \"Interactive Complexity\"?    No    LORELEI Carrera    NAVIGATE Individual Resiliency Trainer  "

## 2024-05-07 ENCOUNTER — VIRTUAL VISIT (OUTPATIENT)
Dept: PSYCHIATRY | Facility: CLINIC | Age: 34
End: 2024-05-07
Payer: COMMERCIAL

## 2024-05-07 DIAGNOSIS — F29 PSYCHOSIS, UNSPECIFIED PSYCHOSIS TYPE (H): Primary | ICD-10-CM

## 2024-05-07 NOTE — PROGRESS NOTES
NAVIGJESUS Clinician Contact & Progress Note  For Individual Resiliency Training (IRT)  A Part of the [facility] First Episode of Psychosis Program    NAVIGATE Enrollee: Laura Webb (1990)     MRN: [id number]  Date:  5/07/24  Diagnosis:Psychosis, unspecified  Clinician: MILAN Individual Resiliency Trainer, LORELEI Carrera     1. Type of contact: (majority of time spent)  IRT Session via telehealth  Mode of communication: [employer] (HIPAA compliant, secure platform). Patient consented verbally to this mode of therapy today.  Reason for telehealth: COVID-19. This patient visit was converted to a telehealth visit to minimize exposure to COVID-19.    2. People present:   Writer  Client: Yes    3. Length of Actual Contact: Start Time: 2:02; End Time: 2:57     4. Location of contact:  Originating Location (patient location): family home, located in [place], [place]  Distant Location (provider location): Home office, located in [place], [place], using appropriate privacy considerations and procedures    5. Did the client complete the home practice option(s) from the previous session: Completed    6. Motivational Teaching Strategies:  Connect info and skills with personal goals  Promote hope and positive expectations    7. Educational Teaching Strategies:  Review of written material/education  Relate information to client's experience  Ask questions to check comprehension  Break down information into small chunks    8. CBT Teaching Strategies:  Reinforcement and shaping (positive feedback for steps towards goals, gains in knowledge & skills and follow-through on home assignments)    9. IRT Module(s) Addressed:  Module 6 - Processing the episode    10. Techniques utilized:   [place] announced at beginning of session  Review of homework  Review of previous meeting  Present new material  Summarize progress made in current session  Other techniques (please specify):   -build rapport by discussing  "preferences, previous experiences of maribell, etc.  -Clarified patient's feelings and perspectives  -Elicited more details about current and recent circumstances  -Emotional support via active and reflective listening, responding to feelings etc.  -Identified patient's strengths/needs  -Identified goals for treatment plan  -Normalized and validated feelings and experiences  -Provided verbal overview of First Episode Program NAVIGATE services  -Provided positive feedback and encouragement  -Provided psychoeducation related to psychosis and related concerns.       11. Measures:    Mental Status Exam  Alertness: alert  and oriented  Behavior/Demeanor: cooperative, pleasant and calm  Speech: normal  Language: intact.   Mood: \"good\"  Thought Process/Associations: unremarkable  Thought Content:  Reports none;  Denies suicidal ideation and delusions  Perception:  Reports none;  Denies auditory hallucinations and visual hallucinations  Insight: adequate  Judgment: good and adequate for safety  Cognition: does  appear grossly intact; formal cognitive testing was not done    EMEKA-7  Not completed. Pt denied anxiety symptoms when asked.     PHQ-9  Not completed. Pt denied depressive symptoms when asked.     [place] Protocol Risk Identification  Pt denied SI when asked.     12. Assessment/Progress Note:     Writer and client met for IRT visit via zoom. Client experienced technical issues and was delayed in joined the visit. Set agenda to check in; continue to expand on IRT modules and material. Reported current symptoms to include: occasional lower mood and occasionally feeling overwhelmed and experiencing acute anxiety. Current stressors include: Pt occasionally has worries that her symptoms of psychosis may return, especially when she is alone and often in the mornings. Reported previously that she worries about this maybe a few times a week.  In regards to medications, client reports:no current concnerns. Reports no issues " "regarding sleep beyond noting that she seems to be tired and go to sleep earlier than she used to, but still sleeps well and now wakes earlier than she used to. Substance use: NA. Assessed safety. Client denies SI, denies intent, and denies plan. HI was not present. SIB was not present.Safety plan has been created in the past. Safety plan was not reviewed today and includes speaking with , speaking with brother or other family, calling crisis lines, going to the nearest ED if SI/HI/SIB becomes overwhelming, worsens and/or becomes active.     Client did not identify concern to be addressed today.     Today's visit:   Laura reports having had an okay week. Upon further discussion, Laura denied psychosis, denied depressed mood, denied fear/anxiety attacks over the past weeks. Laura reports experiencing instances of having flashbacks of the episode, consisting of images of her experiences with the accompanying emotions she had during those experiences. They happen more often in the morning or when she is alone. She describes being able to cope with them but that the worries linger for some time after the flashback. Laura reports on-going worries and concerns about the previous episode and occasional intrusive thoughts about whether another episode will occur in the future. Regarding medication, Laura discussed feeling \"slowed down\" especially after getting DOVE injection for about a day, stated that she is actively working with Dr. Guzmán to assess and address. Reviewed SEE role and discussed the variety of impact of psychosis on a person's ability to work. Reviewed the events of past week. Discussed socialization with family and Good Things that client noted. Continued to discuss Laura's automatic self-stigmatizing thoughts and continued to build more positive replacements for these stigmatizing thoughts. Continued to discuss processing the episode, writer previously reiterated how many people can experience " their episode as a trauma and as such, have a trauma respond when thinking about or remembering the episode. Laura and arletter have previously reviewed her preferred coping strategies and writer encouraged Laura to actively use these strategies as we talk about difficult topics. Laura and writer  discussed the symptoms she experienced during the episode and treatments as a result of having the episode. Writer and Laura reviewed common effects of experiencing an episode of psychosis.  Discussed ways that Laura could be compassionate towards herself. Writer provided validation and active listening. Writer provided normalization of Laura's experiences and reactions.  We will continue to discuss at next visit.     Writer used relational and interpersonal approach to explore feelings, motivations, and behavior. Writer offered support, feedback, validation, and reinforced use of skills taught in IRT from modalities including cognitive behavioral therapy, psycho education, and skills training. Promoted understanding of their experiences of psychosis and how it impacts them in important areas of their life and in recovery goals. Reflected on client's strengths and resiliency factors and facilitated discussion on how these can assist in symptom management, recovery, and well-being.     Home practice identified as: use a cold pack or other coping strategies the next time you have a flare up of anxiety about symptoms returning.     Email sent 5/7:  Hi Laura  Here are the two negative thoughts and the new positive responses that we talked about this week:    Negative thought -   If an episode happens again, I won t be able to control myself and what if something bad happens to my kids or ? What if I harm someone?:   o Positive response to the negative thought -  During the episode I experienced, I never harmed anyone even though I felt that people might try to harm me or others. Also, just because it happened once doesn t  "mean it will happen again.    o We also talked about keeping busy with tasks, distracting yourself with shows or books, and to consider adding exercise into your routine, possibly the exercise bike?    Negative thought -   I won t be able to take care of myself in the future if an episode happens again.   o Positive response to the negative thought -  It may not happen again. If it does happen again, I am more knowledgeable now and my family is more knowledgeable now. We would be able seek out interventions and support sooner. I have a loving  that is here to support me and he will continue to support me.   Warmest regards,   KVNG Carrera, LORELEI     13. Plan/Referrals:     Next visit scheduled for next week.     Billing for \"Interactive Complexity\"?    No    LORELEI Carrera    NAVIGATE Individual Resiliency Trainer        "

## 2024-05-09 ENCOUNTER — VIRTUAL VISIT (OUTPATIENT)
Dept: PSYCHIATRY | Facility: CLINIC | Age: 34
End: 2024-05-09
Payer: COMMERCIAL

## 2024-05-09 DIAGNOSIS — F29 PSYCHOSIS, UNSPECIFIED PSYCHOSIS TYPE (H): Primary | ICD-10-CM

## 2024-05-09 NOTE — PROGRESS NOTES
NAVIGATE/STRENGTHS Virtual Visit Progress Note  For Supported Employment & Education    NAVIGATE Enrollee: Laura Webb (1990)     MRN: [id number]  Date of Visit: 5/09/24  Contacted: SOF  Appointment Length: 30    Discussed:   Writer met with Laura Webb for virtual visit check-in. Meeting agenda included is not currently. But wants work. Perfers work from home but open to community work. If out in the community she can work Tuesdays and Thursdays, open to weekends. Uses [employer].     Follow-up: job search     Randi Acuña  NAVIGATE/STRENGTHS  Supported Employment & Education

## 2024-05-14 ENCOUNTER — VIRTUAL VISIT (OUTPATIENT)
Dept: PSYCHIATRY | Facility: CLINIC | Age: 34
End: 2024-05-14
Payer: COMMERCIAL

## 2024-05-14 DIAGNOSIS — F29 PSYCHOSIS, UNSPECIFIED PSYCHOSIS TYPE (H): Primary | ICD-10-CM

## 2024-05-15 NOTE — PROGRESS NOTES
NAVIGJESUS Clinician Contact & Progress Note  For Individual Resiliency Training (IRT)  A Part of the [facility] First Episode of Psychosis Program    NAVIGATE Enrollee: Laura Webb (1990)     MRN: [id number]  Date:  5/14/24  Diagnosis:Psychosis, unspecified  Clinician: MILAN Individual Resiliency Trainer, LORELEI Carrera     1. Type of contact: (majority of time spent)  IRT Session via telehealth  Mode of communication: [employer] Well (HIPAA compliant, secure platform). Patient consented verbally to this mode of therapy today.  Reason for telehealth: COVID-19. This patient visit was converted to a telehealth visit to minimize exposure to COVID-19.    2. People present:   Writer  Client: Yes    3. Length of Actual Contact: Start Time: 2:09; End Time: 2:58     4. Location of contact:  Originating Location (patient location): family home, located in [place], [place]  Distant Location (provider location): Home office, located in [place], [place], using appropriate privacy considerations and procedures    5. Did the client complete the home practice option(s) from the previous session: Completed    6. Motivational Teaching Strategies:  Connect info and skills with personal goals  Promote hope and positive expectations    7. Educational Teaching Strategies:  Review of written material/education  Relate information to client's experience  Ask questions to check comprehension  Break down information into small chunks    8. CBT Teaching Strategies:  Reinforcement and shaping (positive feedback for steps towards goals, gains in knowledge & skills and follow-through on home assignments)    9. IRT Module(s) Addressed:  Module 6 - Processing the episode    10. Techniques utilized:   [place] announced at beginning of session  Review of homework  Review of previous meeting  Present new material  Summarize progress made in current session  Other techniques (please specify):   -build rapport by discussing  "preferences, previous experiences of maribell, etc.  -Clarified patient's feelings and perspectives  -Elicited more details about current and recent circumstances  -Emotional support via active and reflective listening, responding to feelings etc.  -Identified patient's strengths/needs  -Identified goals for treatment plan  -Normalized and validated feelings and experiences  -Provided verbal overview of First Episode Program NAVIGATE services  -Provided positive feedback and encouragement  -Provided psychoeducation related to psychosis and related concerns.       11. Measures:    Mental Status Exam  Alertness: alert  and oriented  Behavior/Demeanor: cooperative, pleasant and calm  Speech: normal  Language: intact.   Mood: \"good\"  Thought Process/Associations: unremarkable  Thought Content:  Reports none;  Denies suicidal ideation and delusions  Perception:  Reports none;  Denies auditory hallucinations and visual hallucinations  Insight: adequate  Judgment: good and adequate for safety  Cognition: does  appear grossly intact; formal cognitive testing was not done    EMEKA-7  Not completed. Pt denied anxiety symptoms when asked.     PHQ-9  Not completed. Pt denied depressive symptoms when asked.     [place] Protocol Risk Identification  Pt denied SI when asked.     12. Assessment/Progress Note:     Writer and client met for IRT visit via zoom. Client experienced technical issues and was delayed in joined the visit. Set agenda to check in; continue to expand on IRT modules and material. Reported current symptoms to include: occasional lower mood and occasionally feeling overwhelmed and experiencing acute anxiety. Current stressors include: Pt occasionally has worries that her symptoms of psychosis may return, especially when she is alone and often in the mornings. Reported previously that she worries about this maybe a few times a week.  In regards to medications, client reports:no current concnerns. Reports no issues " regarding sleep beyond noting that she seems to be tired and go to sleep earlier than she used to, but still sleeps well and now wakes earlier than she used to. Substance use: NA. Assessed safety. Client denies SI, denies intent, and denies plan. HI was not present. SIB was not present.Safety plan has been created in the past. Safety plan was not reviewed today and includes speaking with , speaking with brother or other family, calling crisis lines, going to the nearest ED if SI/HI/SIB becomes overwhelming, worsens and/or becomes active.     Client did not identify concern to be addressed today.     Today's visit:   Laura reports having had an okay week. Upon further discussion, Laura denied psychosis, denied depressed mood, denied fear/anxiety attacks over the past weeks. Laura reports experiencing instances of having flashbacks of the episode, consisting of images of her experiences with the accompanying emotions she had during those experiences. They happen more often in the morning or when she is alone. She describes being able to cope with them but that the worries linger for some time after the flashback. Laura reports on-going worries and concerns about the previous episode and occasional intrusive thoughts about whether another episode will occur in the future. Regarding medication, Laura reports no concerns currently. Regarding work, Laura continues to look for suitable options. Reviewed the events of past week. Discussed socialization with family and Good Things that client noted. Discussed a sermon at [employer] that particularly resonated with Laura related to her experience of an episode of psychosis. Continued to discuss Laura's automatic self-stigmatizing thoughts and continued to build more positive replacements for these stigmatizing thoughts. Continued to discuss processing the episode, writer previously reiterated how many people can experience their episode as a trauma and as such, have a  "trauma respond when thinking about or remembering the episode. Laura and writer have previously reviewed her preferred coping strategies and writer encouraged Laura to actively use these strategies as we talk about difficult topics. Laura and writer  discussed the symptoms she experienced during the episode and treatments as a result of having the episode. Writer and Laura reviewed common effects of experiencing an episode of psychosis.  Discussed ways that Laura could be compassionate towards herself. Writer provided validation and active listening. Writer provided normalization of Laura's experiences and reactions.  We will continue to discuss at next visit.     Writer used relational and interpersonal approach to explore feelings, motivations, and behavior. Writer offered support, feedback, validation, and reinforced use of skills taught in IRT from modalities including cognitive behavioral therapy, psycho education, and skills training. Promoted understanding of their experiences of psychosis and how it impacts them in important areas of their life and in recovery goals. Reflected on client's strengths and resiliency factors and facilitated discussion on how these can assist in symptom management, recovery, and well-being.     Home practice identified as: discuss worrisome thought \"what if none of the recovery is real\" with  and explore evidence for and against the worrisome thought.    Email sent 5/7:  Hi Laura  Here are the two negative thoughts and the new positive responses that we talked about this week:    Negative thought -   If an episode happens again, I won t be able to control myself and what if something bad happens to my kids or ? What if I harm someone?:   o Positive response to the negative thought -  During the episode I experienced, I never harmed anyone even though I felt that people might try to harm me or others. Also, just because it happened once doesn t mean it will happen " "again.    o We also talked about keeping busy with tasks, distracting yourself with shows or books, and to consider adding exercise into your routine, possibly the exercise bike?    Negative thought -   I won t be able to take care of myself in the future if an episode happens again.   o Positive response to the negative thought -  It may not happen again. If it does happen again, I am more knowledgeable now and my family is more knowledgeable now. We would be able seek out interventions and support sooner. I have a loving  that is here to support me and he will continue to support me.   Warmest regards,   KVNG Carrera, LORELEI     13. Plan/Referrals:     Next visit scheduled for next week.     Billing for \"Interactive Complexity\"?    No    LORELIE Carrera    NAVIGATE Individual Resiliency Trainer      "

## 2024-05-16 ENCOUNTER — VIRTUAL VISIT (OUTPATIENT)
Dept: PSYCHIATRY | Facility: CLINIC | Age: 34
End: 2024-05-16
Payer: COMMERCIAL

## 2024-05-16 DIAGNOSIS — F29 PSYCHOSIS, UNSPECIFIED PSYCHOSIS TYPE (H): Primary | ICD-10-CM

## 2024-05-16 NOTE — PROGRESS NOTES
NAVIGATE/STRENGTHS Virtual Visit Progress Note  For Supported Employment & Education    NAVIGATE Enrollee: Laura Webb (1990)     MRN: [id number]  Date of Visit: 5/16/24  Contacted: SOF  Appointment Length: 15    Discussed:   Writer met with Laura Webb for virtual visit check-in. Meeting agenda included Job searched and set over jobs. She will also send me her resume to look over  Degree: Public Health    Follow-up: did she apply and hear back from any    Randi OROZCOATE/STRENGTHS  Supported Employment & Education

## 2024-05-17 ENCOUNTER — VIRTUAL VISIT (OUTPATIENT)
Dept: PSYCHIATRY | Facility: CLINIC | Age: 34
End: 2024-05-17
Payer: COMMERCIAL

## 2024-05-17 VITALS — BODY MASS INDEX: 22.15 KG/M2 | WEIGHT: 125 LBS | HEIGHT: 63 IN

## 2024-05-17 DIAGNOSIS — F29 PSYCHOSIS, UNSPECIFIED PSYCHOSIS TYPE (H): Primary | ICD-10-CM

## 2024-05-17 DIAGNOSIS — F43.10 PTSD (POST-TRAUMATIC STRESS DISORDER): ICD-10-CM

## 2024-05-17 ASSESSMENT — PAIN SCALES - GENERAL: PAINLEVEL: NO PAIN (0)

## 2024-05-17 ASSESSMENT — PATIENT HEALTH QUESTIONNAIRE - PHQ9: SUM OF ALL RESPONSES TO PHQ QUESTIONS 1-9: 1

## 2024-05-17 NOTE — PROGRESS NOTES
"Virtual Visit Details    Originating Location (pt. Location): Home  {PROVIDER LOCATION On-site should be selected for visits conducted from your clinic location or adjoining [facility] hospital, academic office, or other nearby [facility] building. Off-site should be selected for all other provider locations, including home:[id number]}  Distant Location (provider location):  On-site  Platform used for Video Visit: [email or web address]    NAVIGATE Medication Management Progress Note  A Part of the [facility] First Episode of Psychosis Program    NAVIGATE Enrollee: Laura Webb (1990)     MRN: [id number]  Date:  24         Contributors to the Assessment     Chart Reviewed.   Interview completed with Laura Webb.  Collateral information obtained from Laura.         Chief Complaint      \"***\"         Interim History    Laura Webb is a 33 year old female who was last seen 24 at which point benztropine was increased to 1mg qd. The patient reports good treatment adherence.  History was provided by Laura who was a fair historian.  Since the last visit:  Feels better the next day      PSYCH ROS:  Clinician Rating Form in COMPASS  1. Depressed Mood: Ratin  0 Not reported     1 Very mild occasionally feels sad or  down ; of questionable clinical significance   2 Mild occasionally feels moderately depressed or often feels sad or  down    3 Moderate occasionally feels very depressed or often feels moderately depressed   4 Moderately severe often feels very depressed   5 Severe feels very depressed most of the time   6 Very severe constant extremely painful feelings of depression   U Unable to assess        2. Anxiety/Worry: Rating: 3  0 Not reported     1 Very mild occasionally feels a little anxious; of questionable clinical significance   2 Mild occasionally feels moderately anxious or often feels a little anxious or worried   3 Moderate occasionally feels very anxious or often feels moderately anxious   4 Moderately severe often " feels very anxious or often feels moderately anxious   5 Severe feels very anxious or worried most of the time   6 Very severe patient is continually preoccupied with severe anxiety   U Unable to assess        3. Suicidal Ideation/Behavior: Ratin          0 Not reported     1 Very mild occasional thoughts of dying,  I d be better off dead  or  I wish I were dead    2 Mild frequents thoughts of dying or occasional thoughts of killing self, without plan or method   3 Moderate often thinks of suicide or has though of a specific method   4 Moderately severe has mentally rehearsed a specific method of suicide or has made a suicide attempt with questionable intent to die (e.r. takes aspirins and then tells family)   5 Severe has made preparations for a potentially lethal suicide attempt (e.g acquires a gun and bullets for an attempt)   6 Very severe has made a suicide attempt with an intent to die   U Unable to assess                      4. Elevated/Expansive Mood: Ratin  0 Not at all     1 Very mild questionable; more cheerful than most people in his/her circumstances but of only possible clinical significance   2 Mild brief elevated/expansive mood but only somewhat out of proportion to the circumstances   3 Moderate brief/occasional elevation of mood which is clearly out of proportion to the circumstances   4 Moderately severe sustained/frequent elevation of mood which is clearly out of proportion to the circumstances   5 Severe mood is euphoric most of the time   6 Very severe sustained elevation;  everything is wonderful  almost all of the time   U Unable to assess        5. Hostility/Anger/Irritability/Aggressiveness: Ratin  0 Not at all     1 Very mild occasional irritability of doubtful clinical significance   2 Mild occasionally feels angry or mild or indirect expressions of anger, e.g. sarcasm, disrespect or hostile gestures   3 Moderate frequently feels angry, frequent irritability or occasional  direct expression of anger, e.g. yelling at others   4 Moderately severe often feels very angry, often yells at others or occasionally threatens to harm others   5 Severe has acted on his anger by becoming physically abusive on one or two occasions or makes frequent threats to harm others or is very angry most of the time   6 Very severe has been physically aggressive and/or required intervention to prevent assaultiveness on several occasions; or any serious assaultive act   U Unable to assess        6. Impulsive Behavior: Ratin  0 Not at all     1 Very mild one instance of impulsive behavior which is of doubtful clinical significance   2 Mild occasional impulsive acts, e.g. making phone calls at odd hours   3 Moderate occasional impulsive acts with some potential negative consequence, e.g. leaving work abruptly; changing plans without thinking   4 Moderately severe impulsive acts with definite negative consequences, e.g. overspending on non-essentials; repeated reckless sexual behavior   5 Severe impulsive acts with direct negative consequences, e.g. spends entire income on nonessentials without regard for basic needs   6 Very severe impulsive behavior which is potentially life threatening, e.g. jumps from dangerous height (without suicidal intent) or criminal behavior, e.g. impulsive robbery   U Unable to assess        7. Suspiciousness: Ratin  0 Not present     1 Very mild Seems on guard. Reluctant to respond to some  personal  questions. Reports being overly self-conscious in public   2 Mild Describes incidents in which others have harmed or wanted to harm him/her that sound plausible. Patient feels as if others are watching, laughing, or criticizing him/her in public, but this occurs only occasionally or rarely. Little or no preoccupation   3 Moderate Says others are talking about him/her maliciously, have negative intentions, or may harm him/her. Beyond the likelihood of plausibility, but not  delusional. Incidents of suspected persecution occur occasionally (less than once per week) with some preoccupation   4 Moderately severe Same as 3, but incidents occur frequently such as more than once a week. Patient is moderately preoccupied with ideas of persecution OR patient reports persecutory delusions expressed with much doubt (e.g. partial delusion)   5 Severe Delusional -- speaks of Mafia plots, the FBI, or others poisoning his/her food, persecution by supernatural forces   6 Extremely severe Same as 5, but the beliefs are bizarre or more preoccupying. Patient tends to disclose or act on persecutory delusions.   U Unable to assess        8. Unusual Thought Content: Ratin  0 Not present     1 Very mild Ideas of reference (people may stare or may laugh at him), ideas of persecution (people may mistreat him). Unusual beliefs in psychic green, spirits, UFOs, or unrealistic beliefs in one's own abilities. Not strongly held. Some doubt   2 Mild Same as 1, but degree of reality distortion is more severe as indicated by highly unusual ideas or greater conviction. Content may be typical of delusions (even bizarre), but without full conviction. The delusion does not seem to have fully formed, but is considered as one possible explanation for an unusual experience   3 Moderate Delusion present but no preoccupation or functional impairment. May be an encapsulated delusion or a firmly endorsed absurd belief about past delusional circumstances   4 Moderately severe Full delusion(s) present with some preoccupation OR some areas of functioning disrupted by delusional thinking   5 Severe Full delusion(s) present with much preoccupation OR many areas of functioning are disrupted by delusional thinking   6 Extremely severe Full delusions present with almost   U Unable to assess        9. Hallucinations: Ratin  0 Not present     1 Very mild While resting or going to sleep, sees visions, smells odors, or hears  voices, sounds or whispers in the absence of external stimulation, but no impairment in functioning   2 Mild While in a clear state of consciousness, hears a voice calling the subject s name, experiences non-verbal auditory hallucinations (e.g., sounds or whispers), formless visual hallucinations, or has sensory experiences in the presence of a modality-relevant stimulus (e.g., visual illusions) infrequently (e.g., 1-2 times per week) and with no functional impairment   3 Moderate Occasional verbal, visual, gustatory, olfactory, or tactile hallucinations with no functional impairment OR non-verbal auditory hallucinations/visual illusions more than infrequently or with impairment   4 Moderately severe Experiences daily hallucinations OR some areas of functioning are disrupted by hallucinations   5 Severe Experiences verbal or visual hallucinations several times a day OR many areas of functioning are disrupted by these hallucinations   6 Extremely severe Persistent verbal or visual hallucinations throughout the day OR most areas of functioning are disrupted by these hallucinations   U Unable to assess        10. Conceptual Disorganization: Ratin  0 Not present     1 Very mild Peculiar use of words or rambling but speech is comprehensible   2 Mild Speech a bit hard to understand or make sense of due to tangentiality, circumstantiality, or sudden topic shifts   3 Moderate Speech difficult to understand due to tangentiality, circumstantiality, idiosyncratic speech, or topic shifts on many occasions OR 1-2 instances of incoherent phrases   4 Moderately severe Speech difficult to understand due to circumstantiality, tangentiality, neologisms, blocking, or topic shifts most of the time OR 3-5 instances of incoherent phrases   5 Severe Speech is incomprehensible due to severe impairments most of the time. Many PSRS items cannot be rated by self-report alone   6 Extremely severe Speech is incomprehensible throughout  interview   U Unable to assess        11. Avolition/Apathy: Ratin  0 Not at all     1 Very mild questionable decrease in time spent in goal-directed activities   2 Mild spends less time in goal-directed activities than is appropriate for situation and age   3 Moderate initiates activities at times but does not follow through   4 Moderately severe rarely initiates activity but will passively engage with encouragement   5 Severe almost never initiates activities; requires assistance to accomplish basic activities   6 Very severe does not initiate or persist in any goal-directed activity even with outside assistance   U Unable to assess        12. Asociality/Low Social Drive: Ratin  0 Not at all     1 Very mild questionable   2 Mild slow to initiate social interactions but usually responds to overtures by others   3 Moderate rarely initiates social interactions; sometimes responds to overtures by others   4 Moderately severe does not initiate but sometimes responds to overtures by others; little social interaction outside close family members   5 Severe never initiates and rarely encourages conversations or activities; avoids being with others unless prodded, may have contacts with family   6 Very severe avoids being with others (even family members) whenever possible, extreme social isolation   U Unable to assess        13. Adherence: Days: 0  The longest, continuous amount of time in days, since the last visit when the subject did not take medication      14. EPS Part I: Ratin  Rate Elbow Rigidity for all subjects  0 Normal   1 Slight stiffness and resistance   2 Moderate stiffness and resistance   3 Marked rigidity with difficulty in passive movement   4 Extreme stiffness and rigidity with almost a frozen joint   U Unable to assess            EPS Part 2: Signs of EPS: 0  Are there are other signs of EPS (eg diminished arm swing, postural instability, cogwheeling, tremor, akinesia) present based upon  patient report or exam?  0 No   1 Yes      15. Akathesia: Ratin  0 No restlessness reported or observed   1 Mild restlessness observed; e.g., occasional jiggling of the foot occurs when subject is seated   2 Moderate restlessness observed; e.g., on several occasions, jiggles foot, crosses and uncrosses legs or twists a part of the body   3 Restlessness is frequently observed; e.g., the foot or legs moving most of the time   4 Restlessness persistently observed; e.g., subject cannot sit still, may get up and walk   U Unable to assess      16. Dyskinetic Movement Ratings: Ratin  0 None   1 Minimal, may be extreme normal   2 Mild   3 Moderate   4 Severe   U Unable to assess      SIDE EFFECT ASSESSMENT:  Clinician Rating Form in COMPASS - Side Effect Assessment  Address side effects reported by the patient and rate using this scale  0 Not present   1 Minimal, may be extreme normal   2 Mild   3 Moderate   4 Severe   U Unable to assess   P Present, but not related      Feeling dizzy or faint: 0  Blurred vision: 0  Dry mouth: 0  Too much saliva/droolin  Nausea:  0  Constipation: 0  Increased appetite: 0  Weight gain: 0  Weight loss: 0  Feeling tired/fatigue: 0  Daytime sedation: 0  Hypersomnia: 0  Insomnia: 0  low libido: 0  Other problems with sex: 0  Breast enlargement or discharge:  0  Irregular Menstruation or amenorrhea: 0  Other (please list and rate): 0     RECENT SUBSTANCE USE:  Clinician Rating Form in [facility] - Substance Use Assessment  Alcohol Use Severity: Ratin  0 none   1 use without impairment: drinks but no immediate social or medical impairment   2 use with impairment: e.g. becomes grossly intoxicated; alcohol use or withdrawal compromises school, work or social functioning; alcohol use or withdrawal exacerbates symptoms (e.g. gets depressed when drinking)      Marijuana Use Severity: Ratin  0 none   1 occasional use without impairment: e.g. uses marijuana a few days a month and has no  "immediate social or medical impairment   2 frequent use without impairment: e.g. uses marijuana several or more days a week but has no immediate social or medical impairment   3 use with impairment: e.g. becomes grossly intoxicated; marijuana use compromises school, work or social functioning; marijuana use exacerbates symptoms (e.g. gets paranoid when using)      Other Drug Use Severity: Ratin  0 none   1 occasional use without impairment: e.g. uses drug(s) a few days a month and has no immediate social or medical impairment   2 frequent use without impairment: e.g. uses drug(s) several or more days a week but has no immediate social or medical impairment   3 use with impairment: e.g. becomes grossly intoxicated; drug use compromises school, work or social functioning; drug use exacerbates symptoms (e.g. gets paranoid when using)      RECENT SOCIAL HISTORY:  SEE HPI    Medical ROS:  none         First Episode of Psychosis History      DUP (duration untreated psychosis):  several days to eight months  Route to initial care: hospitalization  Medication adherence overall:  See above, Clinician Rating Form in COMPASS Item 13  General frequency of visits:  monthly  Participation in groups:  No    Reviewed for completion of First Episode work-up:  Yes  First episode workup:  Not Done (if completed, see LABS for results)  MATRICS Consensus Cognitive Battery:  Not Done (if completed, see LABS for results)         Medical/Surgical History     Blood-group specific substance    There is no problem list on file for this patient.           Medications     Current Outpatient Medications   Medication Sig Dispense Refill    benztropine (COGENTIN) 1 MG tablet Take 1 tablet (1 mg) by mouth daily 30 tablet 1    paliperidone (INVEGA SUSTENNA) 156 MG/ML TERRELL injection Inject 1 mL (156 mg) into the muscle every 28 days 1 mL 11             Vitals     Ht 1.6 m (5' 3\")   Wt 56.7 kg (125 lb)   BMI 22.14 kg/m            Mental Status " Exam     Alertness: alert  and oriented  Appearance: well groomed  Behavior/Demeanor: pleasant and calm, with fair  eye contact   Speech: normal; more talkative than at previous appointments  Language: no obvious problem  Psychomotor: normal or unremarkable  Mood:  OK  Affect: blunted; was congruent to mood; was congruent to content  Thought Process/Associations: unremarkable  Thought Content:  Reports none;  Denies suicidal and violent ideation and delusions  Perception:  Reports none;  Denies auditory hallucinations and visual hallucinations  Insight:  difficult to assess as her memory of certain events is limited  Judgment: good, adequate for safety  Cognition: does  appear grossly intact; formal cognitive testing was not done         Labs and Data     RATING SCALES: AIMS due    PHQ9 TODAY =       2/2/2024     9:14 AM 4/5/2024    10:03 AM 5/17/2024    10:31 AM   PHQ-9 SCORE   PHQ-9 Total Score 1 2 1       ANTIPSYCHOTIC LABS ROUTINE    [glu, A1C, lipids (focus LDL), liver enzymes, WBC, ANEU, Hgb, plts]   q12 mo  No lab results found.  No lab results found.  No lab results found.  No lab results found.    Narrative & Impression   EXAM: MR BRAIN W/O and W CONTRAST  LOCATION: [facility]  DATE: 8/22/2023     INDICATION:  Psychosis, unspecified psychosis type (H)  COMPARISON: None.  CONTRAST: 7ml Gadavist  TECHNIQUE: Routine multiplanar multisequence head MRI without and with intravenous contrast.     FINDINGS:  INTRACRANIAL CONTENTS: No acute or subacute infarct. No mass, acute hemorrhage, or extra-axial fluid collections. Normal brain parenchymal signal. Normal ventricles and sulci. Normal position of the cerebellar tonsils. No pathologic contrast enhancement.   Posterior fossa structures including cerebellar hemispheres, fourth ventricle and CP angle cisterns are clear. Nasopharynx is clear. Region of the sella and suprasellar cistern are clear.     SELLA: No abnormality accounting for  technique.     OSSEOUS STRUCTURES/SOFT TISSUES: Normal marrow signal. The major intracranial vascular flow voids are maintained.      ORBITS: No abnormality accounting for technique.      SINUSES/MASTOIDS: No paranasal sinus mucosal disease. No middle ear or mastoid effusion.                                                                    IMPRESSION:  1.  Normal head MRI.            Psychiatric Diagnoses     Schizophreniform disorder, with positive prognostic features  H/o catatonia  Likely PTSD         Assessment     From intake, 8/4/23: Laura Webb is a 32 year old  Black or  Not  or  female who presented for a comprehensive assessment of psychiatric symptoms by the First Episode of Psychosis Navigate Program. Diagnostically challenging given single-episode of mental illness. Differential is broad including psychosis due to medical condition (seizure), bipolar disorder, MDD single episode with psychotic features. Substances do not appear to be a contributing factor. Schizophrenia also considered, though unlikely given rapid-onset of her symptoms and high cognitive and psychosocial functioning following her first episode / hospitalization. Her chart review and interview today present as most consistent with schizophreniform disorder. Prognosis is fair-to-good. Rapid onset of symptoms is positive predictive factor for resolution of symptoms / return to baseline.   We discussed this assessment with her today. We discussed our recommendations: to continue current medications, complete her medical / neurological workup to rule-out underlying epileptic and autoimmune etiologies. Recommend continuing long-acting-injectable for now, ideally for 1 year following resolution of her psychotic symptoms. She expressed understanding of this plan.      TODAY ***    PSYCHOTROPIC DRUG INTERACTIONS:   None.  MANAGEMENT:  N/A         Plan   1) Medications   - continue Invega Sustenna 156  mg IM q28 days (gets this at [place])  - continue benztropine 1 mg      2) Therapy-  weekly IRT      3) Next Due   Labs- remaining first episode labs ordered 08/10/23, still pending; now due for AP monitoring labs  EKG- last completed 4/20/23, repeat if increasing or adding Qtc- prolonging medications   Rating scales- AIMS: annually     4) Referrals  Medical concerns- seizure history, neurology referral for EEG declined by patient       5) RTC: 1 month     6) Crisis Numbers:   Provided routinely in AVS     After hours:  [phone]    TREATMENT RISK STATEMENT:  The risks, benefits, alternatives and potential adverse effects have been discussed and are understood by the pt. The pt understands the risks of using street drugs or alcohol. There are no medical contraindications, the pt agrees to treatment with the ability to do so. The pt knows to call the clinic for any problems or to access emergency care if needed.  Medical and substance use concerns are documented above.  Psychotropic drug interaction check was done, including changes made today.    PROVIDER:  Yaneth Guzmán MD     The longitudinal plan of care for the diagnosis(es)/condition(s) as documented were addressed during this visit. Due to the added complexity in care, I will continue to support Laura in the subsequent management and with ongoing continuity of care.

## 2024-05-17 NOTE — NURSING NOTE
NAVIGATE Patient Self-Rating Form    Since your last medication management visit--    Have you been feeling depressed, sad, or down? No  Have you been feeling anxious, worried or nervous? No  Have you been thinking about death or have you had any feelings that you would be better off dead? No   Have you been feeling particularly good? Yes  Have you been feeling annoyed, angry, or resentful (whether you showed it or not)? No  Did you do anything that could have gotten you in trouble? No  Have you felt dizzy or faint? Yes  Have you had blurred vision? No  Have you had dry mouth? No  Have you had too much saliva in your mouth or had drooling? No  Have you felt nauseous? Yes  Have you been constipated? No  Has you appetite for food been increased? No  Have you gained weight? No  Have you lost weight? No  Have you felt restless or like you cannot sit still? Yes  Any shaking of your hands, legs, or other muscles? No  Any problems walking or moving or any problems feeling stiff or rigid? No  Have you felt tired or fatigued? No  Have you felt drowsy during the day? No  Have you been sleeping too much at night? No  Have you been sleeping too little or had problems sleeping at night? No  Any decrease in your interest in sex? No  Any other problems with sex? No  Any problems with your breasts such as swelling or discharge? No  For women, any problems with your period? Yes  Are there other medical or side effect problems you wish to discuss with your prescriber? No  Since your last visit, how many days have you not taken your medication? 0  Have you had trouble remembering to take your medication? No  Do you find the number of medicines or the times when you are supposed to take then confusing or burdensome? No  Are you afraid of the medication? No  Do you think that you have an illness that requires taking medication? Yes  Do you think that other people would think poorly of you if they knew that you take medication?  "No  On average, how many cigarettes do you smoke per day? 0  Since you last visit, did you drink alcohol? No  Since your last visit, have you used any marijuana? No  Since your last visit, have you used any stress drugs other than marijuana? No  Between now and your next visit, do you think we should keep your medication the same or consider changing the medications? Patient said, \" keep them the same. \"       Is the patient currently in the state of [place]? YES    Visit mode:VIDEO    If the visit is dropped, the patient can be reconnected by: VIDEO VISIT: Text to cell phone:   Telephone Information:   Mobile [phone]       Will anyone else be joining the visit? NO  (If patient encounters technical issues they should call [phone])    How would you like to obtain your AVS? MyChart    Are changes needed to the allergy or medication list? No    Are refills needed on medications prescribed by this physician? NO    Reason for visit: RECHTOD ANTOINEF     "

## 2024-05-17 NOTE — PROGRESS NOTES
Virtual Visit Details    Type of service:  Video Visit   Video Start Time: ***  Video End Time:{video visit start/end time for provider to select:701176}    Originating Location (pt. Location): Home  {PROVIDER LOCATION On-site should be selected for visits conducted from your clinic location or adjoining [facility] hospital, academic office, or other nearby [facility] building. Off-site should be selected for all other provider locations, including home:[id number]}  Distant Location (provider location):  On-site  Platform used for Video Visit: [employer]

## 2024-05-21 ENCOUNTER — VIRTUAL VISIT (OUTPATIENT)
Dept: PSYCHIATRY | Facility: CLINIC | Age: 34
End: 2024-05-21
Payer: COMMERCIAL

## 2024-05-21 DIAGNOSIS — F29 PSYCHOSIS, UNSPECIFIED PSYCHOSIS TYPE (H): Primary | ICD-10-CM

## 2024-05-22 NOTE — PROGRESS NOTES
NAVIGJESUS Clinician Contact & Progress Note  For Individual Resiliency Training (IRT)  A Part of the [facility] First Episode of Psychosis Program    NAVIGATE Enrollee: Laura Webb (1990)     MRN: [id number]  Date:  5/21/24  Diagnosis:Psychosis, unspecified  Clinician: MILAN Individual Resiliency Trainer, LORELEI Carrera     1. Type of contact: (majority of time spent)  IRT Session via telehealth  Mode of communication: [employer] (HIPAA compliant, secure platform). Patient consented verbally to this mode of therapy today.  Reason for telehealth: COVID-19. This patient visit was converted to a telehealth visit to minimize exposure to COVID-19.    2. People present:   Writer  Client: Yes    3. Length of Actual Contact: Start Time: 2:06; End Time: 2:57     4. Location of contact:  Originating Location (patient location): family home, located in [place] [employer] [place] [employer], [place]  Distant Location (provider location): Home office, located in [place], [place], using appropriate privacy considerations and procedures    5. Did the client complete the home practice option(s) from the previous session: Completed    6. Motivational Teaching Strategies:  Connect info and skills with personal goals  Promote hope and positive expectations    7. Educational Teaching Strategies:  Review of written material/education  Relate information to client's experience  Ask questions to check comprehension  Break down information into small chunks    8. CBT Teaching Strategies:  Reinforcement and shaping (positive feedback for steps towards goals, gains in knowledge & skills and follow-through on home assignments)    9. IRT Module(s) Addressed:  Module 6 - Processing the episode    10. Techniques utilized:   [place] announced at beginning of session  Review of homework  Review of previous meeting  Present new material  Summarize progress made in current session  Other techniques (please specify):   -build rapport by discussing  "preferences, previous experiences of maribell, etc.  -Clarified patient's feelings and perspectives  -Elicited more details about current and recent circumstances  -Emotional support via active and reflective listening, responding to feelings etc.  -Identified patient's strengths/needs  -Identified goals for treatment plan  -Normalized and validated feelings and experiences  -Provided verbal overview of First Episode Program NAVIGATE services  -Provided positive feedback and encouragement  -Provided psychoeducation related to psychosis and related concerns.       11. Measures:    Mental Status Exam  Alertness: alert  and oriented  Behavior/Demeanor: cooperative, pleasant and calm  Speech: normal  Language: intact.   Mood: \"good\"  Thought Process/Associations: unremarkable  Thought Content:  Reports none;  Denies suicidal ideation and delusions  Perception:  Reports none;  Denies auditory hallucinations and visual hallucinations  Insight: adequate  Judgment: good and adequate for safety  Cognition: does  appear grossly intact; formal cognitive testing was not done    EMEKA-7  Not completed. Pt denied anxiety symptoms when asked.     PHQ-9  Not completed. Pt denied depressive symptoms when asked.     [place] Protocol Risk Identification  Pt denied SI when asked.     12. Assessment/Progress Note:     Writer and client met for IRT visit via zoom. Client experienced technical issues and was delayed in joined the visit. Set agenda to check in; continue to expand on IRT modules and material. Reported current symptoms to include: occasional lower mood and occasionally feeling overwhelmed and experiencing acute anxiety. Current stressors include: Pt occasionally has worries that her symptoms of psychosis may return, especially when she is alone and often in the mornings. Reported previously that she worries about this maybe a few times a week.  In regards to medications, client reports:no current concnerns. Reports no issues " regarding sleep beyond noting that she seems to be tired and go to sleep earlier than she used to, but still sleeps well and now wakes earlier than she used to. Substance use: NA. Assessed safety. Client denies SI, denies intent, and denies plan. HI was not present. SIB was not present.Safety plan has been created in the past. Safety plan was not reviewed today and includes speaking with , speaking with brother or other family, calling crisis lines, going to the nearest ED if SI/HI/SIB becomes overwhelming, worsens and/or becomes active.     Client did not identify concern to be addressed today.     Today's visit:   Laura reports having had an good week. Upon further discussion, Laura denied psychosis, denied depressed mood, denied fear/anxiety attacks over the past weeks. Laura reports experiencing instances of having flashbacks of the episode, consisting of images of her experiences with the accompanying emotions she had during those experiences. They happen more often in the morning or when she is alone. She describes being able to cope with them but that the worries linger for some time after the flashback. Laura reports on-going worries and concerns about the previous episode and occasional intrusive thoughts about whether another episode will occur in the future. Regarding medication, Laura reports no concerns currently. Regarding work, Laura continues to look for suitable options. Reviewed the events of past week. Discussed things that she enjoyed in the past week. Spent some time reviewing good things and savoring. Discussed planning an enjoyable act Continued to discuss Laura's automatic self-stigmatizing thoughts and continued to build more positive replacements for these stigmatizing thoughts. Continued to discuss processing the episode, writer previously reiterated how many people can experience their episode as a trauma and as such, have a trauma respond when thinking about or remembering the  "episode. Laura and writer have previously reviewed her preferred coping strategies and writer encouraged Laura to actively use these strategies as we talk about difficult topics. Laura and writer  discussed the symptoms she experienced during the episode and treatments as a result of having the episode. Writer and Laura reviewed common effects of experiencing an episode of psychosis.  Discussed ways that Laura could be compassionate towards herself. Writer provided validation and active listening. Writer provided normalization of Laura's experiences and reactions.  We will continue to discuss at next visit.     Writer used relational and interpersonal approach to explore feelings, motivations, and behavior. Writer offered support, feedback, validation, and reinforced use of skills taught in IRT from modalities including cognitive behavioral therapy, psycho education, and skills training. Promoted understanding of their experiences of psychosis and how it impacts them in important areas of their life and in recovery goals. Reflected on client's strengths and resiliency factors and facilitated discussion on how these can assist in symptom management, recovery, and well-being.     Home practice identified as: discuss worrisome thought \"what if none of the recovery is real\" with  and explore evidence for and against the worrisome thought.      13. Plan/Referrals:     Next visit scheduled for next week.     Billing for \"Interactive Complexity\"?    No    LORELEI CarreraATE Individual Resiliency Trainer      "

## 2024-05-28 ENCOUNTER — VIRTUAL VISIT (OUTPATIENT)
Dept: PSYCHIATRY | Facility: CLINIC | Age: 34
End: 2024-05-28
Payer: COMMERCIAL

## 2024-05-28 DIAGNOSIS — F29 PSYCHOSIS, UNSPECIFIED PSYCHOSIS TYPE (H): Primary | ICD-10-CM

## 2024-05-28 NOTE — PROGRESS NOTES
NAVIGJESUS Clinician Contact & Progress Note  For Individual Resiliency Training (IRT)  A Part of the [facility] First Episode of Psychosis Program    NAVIGATE Enrollee: Laura Webb (1990)     MRN: [id number]  Date:  5/28/24  Diagnosis:Psychosis, unspecified  Clinician: MILAN Individual Resiliency Trainer, LORELEI Carrera     1. Type of contact: (majority of time spent)  IRT Session via telehealth  Mode of communication: [employer] (HIPAA compliant, secure platform). Patient consented verbally to this mode of therapy today.  Reason for telehealth: COVID-19. This patient visit was converted to a telehealth visit to minimize exposure to COVID-19.    2. People present:   Writer  Client: Yes    3. Length of Actual Contact: Start Time: 2:10; End Time: 2:57     4. Location of contact:  Originating Location (patient location): family home, located in [place], [place]  Distant Location (provider location): Home office, located in [place], [place], using appropriate privacy considerations and procedures    5. Did the client complete the home practice option(s) from the previous session: Completed    6. Motivational Teaching Strategies:  Connect info and skills with personal goals  Promote hope and positive expectations    7. Educational Teaching Strategies:  Review of written material/education  Relate information to client's experience  Ask questions to check comprehension  Break down information into small chunks    8. CBT Teaching Strategies:  Reinforcement and shaping (positive feedback for steps towards goals, gains in knowledge & skills and follow-through on home assignments)    9. IRT Module(s) Addressed:  Module 6 - Processing the episode    10. Techniques utilized:   [place] announced at beginning of session  Review of homework  Review of previous meeting  Present new material  Summarize progress made in current session  Other techniques (please specify):   -build rapport by discussing  "preferences, previous experiences of maribell, etc.  -Clarified patient's feelings and perspectives  -Elicited more details about current and recent circumstances  -Emotional support via active and reflective listening, responding to feelings etc.  -Identified patient's strengths/needs  -Identified goals for treatment plan  -Normalized and validated feelings and experiences  -Provided verbal overview of First Episode Program NAVIGATE services  -Provided positive feedback and encouragement  -Provided psychoeducation related to psychosis and related concerns.       11. Measures:    Mental Status Exam  Alertness: alert  and oriented  Behavior/Demeanor: cooperative, pleasant and calm  Speech: normal  Language: intact.   Mood: \"good\"  Thought Process/Associations: unremarkable  Thought Content:  Reports none;  Denies suicidal ideation and delusions  Perception:  Reports none;  Denies auditory hallucinations and visual hallucinations  Insight: adequate  Judgment: good and adequate for safety  Cognition: does  appear grossly intact; formal cognitive testing was not done    EMEKA-7  Not completed. Pt denied anxiety symptoms when asked.     PHQ-9  Not completed. Pt denied depressive symptoms when asked.     [place] Protocol Risk Identification  Pt denied SI when asked.     12. Assessment/Progress Note:     Writer and client met for IRT visit via zoom. Client experienced technical issues and was delayed in joined the visit. Set agenda to check in; continue to expand on IRT modules and material. Reported current symptoms to include: occasional lower mood and occasionally feeling overwhelmed and experiencing acute anxiety. Current stressors include: Pt occasionally has worries that her symptoms of psychosis may return, especially when she is alone and often in the mornings. Reported previously that she worries about this regularly and that some weeks are easier than other weeks.  In regards to medications, client reports:no current " kasi. Reports no issues regarding sleep beyond noting that she seems to be tired and go to sleep earlier than she used to, but still sleeps well and now wakes earlier than she used to. Substance use: NA. Assessed safety. Client denies SI, denies intent, and denies plan. HI was not present. SIB was not present.Safety plan has been created in the past. Safety plan was not reviewed today and includes speaking with , speaking with brother or other family, calling crisis lines, going to the nearest ED if SI/HI/SIB becomes overwhelming, worsens and/or becomes active.     Client did not identify concern to be addressed today.     Today's visit:   Laura reports having had an good week. Upon further discussion, Laura denied psychosis, denied depressed mood, denied fear/anxiety attacks over the past weeks. Laura reports experiencing instances of having flashbacks of the episode, consisting of images of her experiences with the accompanying emotions she had during those experiences. They happen more often in the morning or when she is alone. She describes being able to cope with them but that the worries linger for some time after the flashback. Laura reports on-going worries and concerns about the previous episode and occasional intrusive thoughts about whether another episode will occur in the future. Regarding medication, Laura reports no concerns currently. Regarding work, Laura continues to look for suitable options. Reviewed the events of past week. Discussed things that she enjoyed in the past week. Spent some time reviewing good things and savoring. Discussed planning an enjoyable act previously - Laura did not have a planned fun act, but did discuss making friends with someone from [employer].  Continued to discuss Laura's automatic self-stigmatizing thoughts and continued to build more positive replacements for these stigmatizing thoughts. Spent time discussing the idea that Pt feels she is at fault for  "causing her own episode. Identified some other common worrisome thoughts and evaluated them. Attempted to identify more positive responses to these worrisome thoughts. We will continue to discuss at next visit.     Writer used relational and interpersonal approach to explore feelings, motivations, and behavior. Writer offered support, feedback, validation, and reinforced use of skills taught in IRT from modalities including cognitive behavioral therapy, psycho education, and skills training. Promoted understanding of their experiences of psychosis and how it impacts them in important areas of their life and in recovery goals. Reflected on client's strengths and resiliency factors and facilitated discussion on how these can assist in symptom management, recovery, and well-being.     Home practice identified as: discuss worrisome thought \"what if none of the recovery is real\" with  and explore evidence for and against the worrisome thought.      13. Plan/Referrals:     Next visit scheduled for next week.     Billing for \"Interactive Complexity\"?    No    LORELEI CarreraATE Individual Resiliency Trainer      "

## 2024-06-04 ENCOUNTER — VIRTUAL VISIT (OUTPATIENT)
Dept: PSYCHIATRY | Facility: CLINIC | Age: 34
End: 2024-06-04
Payer: COMMERCIAL

## 2024-06-04 DIAGNOSIS — F29 PSYCHOSIS, UNSPECIFIED PSYCHOSIS TYPE (H): Primary | ICD-10-CM

## 2024-06-05 NOTE — PROGRESS NOTES
NAVIGJESUS Clinician Contact & Progress Note  For Individual Resiliency Training (IRT)  A Part of the [facility] First Episode of Psychosis Program    NAVIGATE Enrollee: Laura Webb (1990)     MRN: [id number]  Date:  6/04/24  Diagnosis:Psychosis, unspecified  Clinician: MILAN Individual Resiliency Trainer, LORELEI Carrera     1. Type of contact: (majority of time spent)  IRT Session via telehealth  Mode of communication: [employer] (HIPAA compliant, secure platform). Patient consented verbally to this mode of therapy today.  Reason for telehealth: COVID-19. This patient visit was converted to a telehealth visit to minimize exposure to COVID-19.    2. People present:   Writer  Client: Yes    3. Length of Actual Contact: Start Time: 2:13; End Time: 2:57     4. Location of contact:  Originating Location (patient location): family home, located in [place], [place]  Distant Location (provider location): Home office, located in [place], [place], using appropriate privacy considerations and procedures    5. Did the client complete the home practice option(s) from the previous session: Completed    6. Motivational Teaching Strategies:  Connect info and skills with personal goals  Promote hope and positive expectations    7. Educational Teaching Strategies:  Review of written material/education  Relate information to client's experience  Ask questions to check comprehension  Break down information into small chunks    8. CBT Teaching Strategies:  Reinforcement and shaping (positive feedback for steps towards goals, gains in knowledge & skills and follow-through on home assignments)    9. IRT Module(s) Addressed:  Module 6 - Processing the episode    10. Techniques utilized:   [place] announced at beginning of session  Review of homework  Review of previous meeting  Present new material  Summarize progress made in current session  Other techniques (please specify):   -build rapport by discussing  "preferences, previous experiences of maribell, etc.  -Clarified patient's feelings and perspectives  -Elicited more details about current and recent circumstances  -Emotional support via active and reflective listening, responding to feelings etc.  -Identified patient's strengths/needs  -Identified goals for treatment plan  -Normalized and validated feelings and experiences  -Provided verbal overview of First Episode Program NAVIGATE services  -Provided positive feedback and encouragement  -Provided psychoeducation related to psychosis and related concerns.       11. Measures:    Mental Status Exam  Alertness: alert  and oriented  Behavior/Demeanor: cooperative, pleasant and calm  Speech: normal  Language: intact.   Mood: \"good\"  Thought Process/Associations: unremarkable  Thought Content:  Reports none;  Denies suicidal ideation and delusions  Perception:  Reports none;  Denies auditory hallucinations and visual hallucinations  Insight: adequate  Judgment: good and adequate for safety  Cognition: does  appear grossly intact; formal cognitive testing was not done    EMEKA-7  Not completed. Pt denied anxiety symptoms when asked.     PHQ-9  Not completed. Pt denied depressive symptoms when asked.     [place] Protocol Risk Identification  Pt denied SI when asked.     12. Assessment/Progress Note:     Writer and client met for IRT visit via zoom. Client experienced technical issues and was delayed in joined the visit. Set agenda to check in; continue to expand on IRT modules and material. Reported current symptoms to include: occasional lower mood and occasionally feeling overwhelmed and experiencing acute anxiety. Current stressors include: Pt occasionally has worries that her symptoms of psychosis may return, especially when she is alone and often in the mornings. Reported previously that she worries about this regularly and that some weeks are easier than other weeks.  In regards to medications, client reports:no current " kasi. Reports no issues regarding sleep beyond noting that she seems to be tired and go to sleep earlier than she used to, but still sleeps well and now wakes earlier than she used to. Substance use: NA. Assessed safety. Client denies SI, denies intent, and denies plan. HI was not present. SIB was not present.Safety plan has been created in the past. Safety plan was not reviewed today and includes speaking with , speaking with brother or other family, calling crisis lines, going to the nearest ED if SI/HI/SIB becomes overwhelming, worsens and/or becomes active.     Client did not identify concern to be addressed today.     Today's visit:   Laura reports having had an good week. Upon further discussion, Laura denied psychosis, denied depressed mood, denied fear/anxiety attacks over the past weeks. Laura reports experiencing instances of having flashbacks of the episode, consisting of images of her experiences with the accompanying emotions she had during those experiences. They happen more often in the morning or when she is alone. She describes being able to cope with them but that the worries linger for some time after the flashback. Laura reports on-going worries and concerns about the previous episode and occasional intrusive thoughts about whether another episode will occur in the future. Regarding medication, Laura reports no concerns currently. Regarding work, Laura continues to look for suitable options. Reviewed the events of past week. Discussed things that she enjoyed in the past week. Spent some time reviewing good things and savoring. Laura discussed an event at a bowling alley that the whole family enjoyed.  Continued to discuss Laura's automatic self-stigmatizing thoughts and continued to build more positive replacements for these stigmatizing thoughts. Completed a draft of positive responses to all the identified self stigmatizing thoughts, which writer emailed to client with her  permission, see below:  Negative thought -   I am not going to make it if I have another episode.    Positive response to the negative thought -  I trust god to get me through whatever comes.      Negative thought -   If an episode happens again, I won't be able to control myself and what if something bad happens to my kids or ? What if I harm someone?   Positive response to the negative thought -  During the episode I experienced, I never harmed anyone even though I felt that people might try to harm me or others. Also, just because it happened once doesn't mean it will happen again.       Negative thought -   I won't be able to take care of myself in the future if an episode happens again.   Positive response to the negative thought -  It may not happen again. If it does happen again, I am more knowledgeable now and my family is more knowledgeable now. We would be able seek out interventions and support sooner. I have a loving  that is here to support me and he will continue to support me.     Negative thought -   what if it happens again?   Positive response to the negative thought -  My recovery is going well. I did not lose my family. I am getting the help that I need, I am taking medication, doing therapy and I am capable.      Negative thought -   What if this all (my life my recovery) is a figment of my imagination?   Tell myself that I am okay and this is my reality. Just push through.  Evidence for  When I was sick, things I thought were happening were not actually happening, so that could be happening. (makes it understandable that you worry about this)  Evidence against  The fact that this is after treatment. My brother told me that I was experiencing psychosis. He would do so again.   Positive response to the negative thought -  I am okay and this is my reality.  Tell myself that I am okay (and then just push through the thought). Remind myself that my loved ones would tell me if I was  "experiencing psychosis symptoms.     Negative thought -  It's my fault that this happened.   (you have difficulty giving yourself the sinai that you freely give to others)  Positive response to the negative thought -  I would not blame other's in the same situation, and I deserve the same sinai that I give to others - it is not the fault of the person when they get sick.     Negative thought -  Because I had an episode of psychosis, I can't trust myself.      Positive response to the negative thought -  My recovery is going well. I did not lose my family. I am getting the help that I need, I am taking medication, doing therapy and I am capable.      Negative thought -   I'm crazy and I always will be.    Positive response to the negative thought -  I am in a different segment of my life. I am okay, I am recovering, and I am not crazy.     Discussed concept of radical acceptance and the benefit of focused meditation. Client agreed to attempt to meditate with the aim of redirecting her focus back to the anchor over the coming week.     Writer used relational and interpersonal approach to explore feelings, motivations, and behavior. Writer offered support, feedback, validation, and reinforced use of skills taught in IRT from modalities including cognitive behavioral therapy, psycho education, and skills training. Promoted understanding of their experiences of psychosis and how it impacts them in important areas of their life and in recovery goals. Reflected on client's strengths and resiliency factors and facilitated discussion on how these can assist in symptom management, recovery, and well-being.     Home practice identified as: Client agreed to attempt to meditate with the aim of redirecting her focus back to the anchor over the coming week.       13. Plan/Referrals:     Next visit scheduled for next week.     Billing for \"Interactive Complexity\"?    No    LORELEI Carrera    NAVIGATE Individual Resiliency " Trainer

## 2024-06-11 ENCOUNTER — VIRTUAL VISIT (OUTPATIENT)
Dept: PSYCHIATRY | Facility: CLINIC | Age: 34
End: 2024-06-11
Payer: COMMERCIAL

## 2024-06-11 DIAGNOSIS — F29 PSYCHOSIS, UNSPECIFIED PSYCHOSIS TYPE (H): Primary | ICD-10-CM

## 2024-06-12 NOTE — PROGRESS NOTES
NAVIGJESUS Clinician Contact & Progress Note  For Individual Resiliency Training (IRT)  A Part of the [facility] First Episode of Psychosis Program    NAVIGATE Enrollee: Laura Webb (1990)     MRN: [id number]  Date:  6/11/24  Diagnosis:Psychosis, unspecified  Clinician: MILAN Individual Resiliency Trainer, LORELEI Carrera     1. Type of contact: (majority of time spent)  IRT Session via telehealth  Mode of communication: [employer] (HIPAA compliant, secure platform). Patient consented verbally to this mode of therapy today.  Reason for telehealth: COVID-19. This patient visit was converted to a telehealth visit to minimize exposure to COVID-19.    2. People present:   Writer  Client: Yes    3. Length of Actual Contact: Start Time: 2:12; End Time: 3:00     4. Location of contact:  Originating Location (patient location): family home, located in [place], [place]  Distant Location (provider location): Home office, located in [place], [place], using appropriate privacy considerations and procedures    5. Did the client complete the home practice option(s) from the previous session: Completed    6. Motivational Teaching Strategies:  Connect info and skills with personal goals  Promote hope and positive expectations    7. Educational Teaching Strategies:  Review of written material/education  Relate information to client's experience  Ask questions to check comprehension  Break down information into small chunks    8. CBT Teaching Strategies:  Reinforcement and shaping (positive feedback for steps towards goals, gains in knowledge & skills and follow-through on home assignments)    9. IRT Module(s) Addressed:  Module 6 - Processing the episode    10. Techniques utilized:   [place] announced at beginning of session  Review of homework  Review of previous meeting  Present new material  Summarize progress made in current session  Other techniques (please specify):   -build rapport by discussing  "preferences, previous experiences of maribell, etc.  -Clarified patient's feelings and perspectives  -Elicited more details about current and recent circumstances  -Emotional support via active and reflective listening, responding to feelings etc.  -Identified patient's strengths/needs  -Identified goals for treatment plan  -Normalized and validated feelings and experiences  -Provided verbal overview of First Episode Program NAVIGATE services  -Provided positive feedback and encouragement  -Provided psychoeducation related to psychosis and related concerns.       11. Measures:    Mental Status Exam  Alertness: alert  and oriented  Behavior/Demeanor: cooperative, pleasant and calm  Speech: normal  Language: intact.   Mood: \"good\"  Thought Process/Associations: unremarkable  Thought Content:  Reports none;  Denies suicidal ideation and delusions  Perception:  Reports none;  Denies auditory hallucinations and visual hallucinations  Insight: adequate  Judgment: good and adequate for safety  Cognition: does  appear grossly intact; formal cognitive testing was not done    EMEKA-7  Not completed. Pt denied anxiety symptoms when asked.     PHQ-9  Not completed. Pt denied depressive symptoms when asked.     [place] Protocol Risk Identification  Pt denied SI when asked.     12. Assessment/Progress Note:     Writer and client met for IRT visit via zoom. Client experienced technical issues and was delayed in joined the visit. Set agenda to check in; continue to expand on IRT modules and material. Reported current symptoms to include: occasional lower mood and occasionally feeling overwhelmed and experiencing acute anxiety. Current stressors include: Pt occasionally has worries that her symptoms of psychosis may return, especially when she is alone and often in the mornings. Reported previously that she worries about this regularly and that some weeks are easier than other weeks.  In regards to medications, client reports:no current " kasi. Reports no issues regarding sleep beyond noting that she seems to be tired and go to sleep earlier than she used to, but still sleeps well and now wakes earlier than she used to. Substance use: NA. Assessed safety. Client denies SI, denies intent, and denies plan. HI was not present. SIB was not present.Safety plan has been created in the past. Safety plan was not reviewed today and includes speaking with , speaking with brother or other family, calling crisis lines, going to the nearest ED if SI/HI/SIB becomes overwhelming, worsens and/or becomes active.     Client did not identify concern to be addressed today.     Today's visit:   Laura reports having had an good week. Upon further discussion, Laura denied psychosis, denied depressed mood, denied fear/anxiety attacks over the past weeks. Laura reports experiencing instances of having flashbacks of the episode, consisting of images of her experiences with the accompanying emotions she had during those experiences. She describes being able to cope with them but that the worries linger for some time after the flashback. Laura reports on-going worries and concerns about the previous episode and occasional intrusive thoughts about whether another episode will occur in the future. Regarding medication, Laura reports no concerns currently, discussed how she really appreciates the care she receives from staff where she receives her injections monthly. Regarding work, Laura continues to look for suitable options. Reviewed the events of past week. Discussed things that she enjoyed in the past week. Spent some time reviewing good things, particularly her daughter's graduation from . Client reports that she received the draft of positive responses to all the identified self stigmatizing thoughts, wand used them to challenge self-stigmatizing thoughts since our last visit. Reviewed the suggested formatting and Laura preferences around telling her  "story. Writer posed prompting questions for Laura to begin reviewing her experiences before the episode. Will continue at next visit.     Previously discussed concept of radical acceptance and the benefit of focused meditation. Client agreed to attempt to meditate with the aim of redirecting her focus back to the anchor over the coming week.     Writer used relational and interpersonal approach to explore feelings, motivations, and behavior. Writer offered support, feedback, validation, and reinforced use of skills taught in IRT from modalities including cognitive behavioral therapy, psycho education, and skills training. Promoted understanding of their experiences of psychosis and how it impacts them in important areas of their life and in recovery goals. Reflected on client's strengths and resiliency factors and facilitated discussion on how these can assist in symptom management, recovery, and well-being.     Home practice identified as: Client agreed to attempt to meditate with the aim of redirecting her focus back to the anchor over the coming week.       13. Plan/Referrals:     Next visit scheduled for next week.     Billing for \"Interactive Complexity\"?    No    LORELEI Carrera Individual Resiliency Trainer      "

## 2024-06-14 ENCOUNTER — VIRTUAL VISIT (OUTPATIENT)
Dept: PSYCHIATRY | Facility: CLINIC | Age: 34
End: 2024-06-14
Payer: COMMERCIAL

## 2024-06-14 ENCOUNTER — TELEPHONE (OUTPATIENT)
Dept: PSYCHIATRY | Facility: CLINIC | Age: 34
End: 2024-06-14

## 2024-06-14 VITALS — BODY MASS INDEX: 21.26 KG/M2 | HEIGHT: 63 IN | WEIGHT: 120 LBS

## 2024-06-14 DIAGNOSIS — F43.10 PTSD (POST-TRAUMATIC STRESS DISORDER): ICD-10-CM

## 2024-06-14 DIAGNOSIS — F29 PSYCHOSIS, UNSPECIFIED PSYCHOSIS TYPE (H): Primary | ICD-10-CM

## 2024-06-14 RX ORDER — PALIPERIDONE PALMITATE 117 MG/.75ML
117 INJECTION INTRAMUSCULAR
Qty: 0.75 ML | Refills: 2 | Status: SHIPPED | OUTPATIENT
Start: 2024-06-14 | End: 2024-10-01

## 2024-06-14 RX ORDER — BENZTROPINE MESYLATE 1 MG/1
1 TABLET ORAL DAILY
Qty: 30 TABLET | Refills: 4 | Status: SHIPPED | OUTPATIENT
Start: 2024-06-14

## 2024-06-14 ASSESSMENT — PAIN SCALES - GENERAL: PAINLEVEL: NO PAIN (0)

## 2024-06-14 ASSESSMENT — PATIENT HEALTH QUESTIONNAIRE - PHQ9: SUM OF ALL RESPONSES TO PHQ QUESTIONS 1-9: 3

## 2024-06-14 NOTE — NURSING NOTE
NAVIGATE Patient Self-Rating Form    Since your last medication management visit--    Have you been feeling depressed, sad, or down? No  Have you been feeling anxious, worried or nervous? Yes  Have you been thinking about death or have you had any feelings that you would be better off dead? No   Have you been feeling particularly good? Yes  Have you been feeling annoyed, angry, or resentful (whether you showed it or not)? No  Did you do anything that could have gotten you in trouble? No  Have you felt dizzy or faint? No  Have you had blurred vision? No  Have you had dry mouth? No  Have you had too much saliva in your mouth or had drooling? No  Have you felt nauseous? Yes  Have you been constipated? No  Has you appetite for food been increased? No  Have you gained weight? No  Have you lost weight? No  Have you felt restless or like you cannot sit still? No  Any shaking of your hands, legs, or other muscles? No  Any problems walking or moving or any problems feeling stiff or rigid? No  Have you felt tired or fatigued? No  Have you felt drowsy during the day? No  Have you been sleeping too much at night? No  Have you been sleeping too little or had problems sleeping at night? No  Any decrease in your interest in sex? No  Any other problems with sex? No  Any problems with your breasts such as swelling or discharge? No  For women, any problems with your period? Yes  Are there other medical or side effect problems you wish to discuss with your prescriber? No  Since your last visit, how many days have you not taken your medication? 0  Have you had trouble remembering to take your medication? No  Do you find the number of medicines or the times when you are supposed to take then confusing or burdensome? No  Are you afraid of the medication? No  Do you think that you have an illness that requires taking medication? Yes  Do you think that other people would think poorly of you if they knew that you take medication? No  On  "average, how many cigarettes do you smoke per day? 0  Since you last visit, did you drink alcohol? No  Since your last visit, have you used any marijuana? No  Since your last visit, have you used any stress drugs other than marijuana? No  Between now and your next visit, do you think we should keep your medication the same or consider changing the medications? Patient  said, \" keep them the same.\"           Is the patient currently in the state of [place]? YES    Visit mode:VIDEO    If the visit is dropped, the patient can be reconnected by: VIDEO VISIT: Text to cell phone:   Telephone Information:   Mobile [phone]       Will anyone else be joining the visit? NO  (If patient encounters technical issues they should call [phone])    How would you like to obtain your AVS? MyChart    Are changes needed to the allergy or medication list? No    Are refills needed on medications prescribed by this physician? NO    Reason for visit: EVER ANTOINEF      "

## 2024-06-14 NOTE — PROGRESS NOTES
"Virtual Visit Details    Originating Location (pt. Location): Home    Distant Location (provider location):  On-site  Platform used for Video Visit: [employer]      MILAN Medication Management Progress Note  A Part of the [facility] First Episode of Psychosis Program    NAVIGATE Enrollee: Laura Webb (1990)     MRN: [id number]  Date:  6/14/24         Contributors to the Assessment     Chart Reviewed.   Interview completed with Laura Webb.  Collateral information obtained from Laura.         Chief Complaint     \"Doing well\"         Interim History    Laura Webb is a 33 year old female who was last seen 5/17/24 at which point we advised taking benztropine immediately prior to her next injection. The patient reports good treatment adherence.  History was provided by Laura who was a fair historian.  Since the last visit:  -Benztropine has been helping with tightness; the injection made her feel slow but not as slow as the time before  -she denies panic attacks; last had one \"some months ago\"  -feeling well overall, still looking for jobs. No interviews recently  -still interested in decreasing injection dose    RECENT SOCIAL HISTORY:  SEE HPI    Medical ROS:  none         First Episode of Psychosis History      DUP (duration untreated psychosis):  several days to eight months  Route to initial care: hospitalization  Medication adherence overall:  See above, Clinician Rating Form in COMPASS Item 13  General frequency of visits:  monthly  Participation in groups:  No    Reviewed for completion of First Episode work-up:  Yes  First episode workup:  Not Done (if completed, see LABS for results)  MATRICS Consensus Cognitive Battery:  Not Done (if completed, see LABS for results)         Medical/Surgical History     Blood-group specific substance    There is no problem list on file for this patient.           Medications     Current Outpatient Medications   Medication Sig Dispense Refill    benztropine " "(COGENTIN) 1 MG tablet Take 1 tablet (1 mg) by mouth daily 30 tablet 1    paliperidone (INVEGA SUSTENNA) 156 MG/ML TERRELL injection Inject 1 mL (156 mg) into the muscle every 28 days 1 mL 11             Vitals     Ht 1.6 m (5' 3\")   Wt 54.4 kg (120 lb)   BMI 21.26 kg/m            Mental Status Exam     Alertness: alert  and oriented  Appearance: well groomed  Behavior/Demeanor: pleasant and calm, with fair  eye contact, somewhat distant look at baseline  Speech: normal;   Language: no obvious problem  Psychomotor: normal or unremarkable  Mood:  OK  Affect: blunted; was congruent to mood; was congruent to content  Thought Process/Associations: unremarkable  Thought Content:  Reports none;  Denies suicidal and violent ideation and delusions  Perception:  Reports none;  Denies auditory hallucinations and visual hallucinations  Insight:  fair, improving  Judgment: good, adequate for safety  Cognition: does  appear grossly intact; formal cognitive testing was not done         Labs and Data     RATING SCALES: AIMS due    PHQ9 TODAY =       4/5/2024    10:03 AM 5/17/2024    10:31 AM 6/14/2024     8:11 AM   PHQ-9 SCORE   PHQ-9 Total Score 2 1 3       ANTIPSYCHOTIC LABS ROUTINE    [glu, A1C, lipids (focus LDL), liver enzymes, WBC, ANEU, Hgb, plts]   q12 mo  No lab results found.  No lab results found.  No lab results found.  No lab results found.    Narrative & Impression   EXAM: MR BRAIN W/O and W CONTRAST  LOCATION: [facility]  DATE: 8/22/2023     INDICATION:  Psychosis, unspecified psychosis type (H)  COMPARISON: None.  CONTRAST: 7ml Gadavist  TECHNIQUE: Routine multiplanar multisequence head MRI without and with intravenous contrast.     FINDINGS:  INTRACRANIAL CONTENTS: No acute or subacute infarct. No mass, acute hemorrhage, or extra-axial fluid collections. Normal brain parenchymal signal. Normal ventricles and sulci. Normal position of the cerebellar tonsils. No pathologic contrast " enhancement.   Posterior fossa structures including cerebellar hemispheres, fourth ventricle and CP angle cisterns are clear. Nasopharynx is clear. Region of the sella and suprasellar cistern are clear.     SELLA: No abnormality accounting for technique.     OSSEOUS STRUCTURES/SOFT TISSUES: Normal marrow signal. The major intracranial vascular flow voids are maintained.      ORBITS: No abnormality accounting for technique.      SINUSES/MASTOIDS: No paranasal sinus mucosal disease. No middle ear or mastoid effusion.                                                                    IMPRESSION:  1.  Normal head MRI.            Psychiatric Diagnoses     Schizophreniform disorder, with positive prognostic features  H/o catatonia  Likely PTSD         Assessment     From intake, 8/4/23: Laura Webb is a 32 year old  Black or  Not  or  female who presented for a comprehensive assessment of psychiatric symptoms by the First Episode of Psychosis Navigate Program. Diagnostically challenging given single-episode of mental illness. Differential is broad including psychosis due to medical condition (seizure), bipolar disorder, MDD single episode with psychotic features. Substances do not appear to be a contributing factor. Schizophrenia also considered, though unlikely given rapid-onset of her symptoms and high cognitive and psychosocial functioning following her first episode / hospitalization. Her chart review and interview today present as most consistent with schizophreniform disorder. Prognosis is fair-to-good. Rapid onset of symptoms is positive predictive factor for resolution of symptoms / return to baseline.   We discussed this assessment with her today. We discussed our recommendations: to continue current medications, complete her medical / neurological workup to rule-out underlying epileptic and autoimmune etiologies. Recommend continuing long-acting-injectable for now,  ideally for 1 year following resolution of her psychotic symptoms. She expressed understanding of this plan.      TODAY Laura reports slight improvement in feeling slowed the day of the injection after premedicating with benztropine last week; bounced back to normal the following day. Inconsistent history with catatonia but it is distressing to her. We had discussed trying a slightly lower dose of the injection given significant improvement and potential emergence of worsening EPSE with the slowing; she is still interested in this. Next dose will be 117 mg down from 156 mg; then will see her 11 days later and reassess.    PSYCHOTROPIC DRUG INTERACTIONS:   None.  MANAGEMENT:  N/A         Plan   1) Medications   - Decrease next Invega Sustenna dose from 156 mg to 117 mg IM q28 days (gets this at [place])  - continue benztropine 1 mg      2) Therapy-  weekly IRT      3) Next Due   Labs- remaining first episode labs ordered 08/10/23, still pending; now due for AP monitoring labs  EKG- last completed 4/20/23, repeat if increasing or adding Qtc- prolonging medications   Rating scales- AIMS: due      4) Referrals  Medical concerns- seizure history, neurology referral for EEG declined by patient       5) RTC: 1 month     6) Crisis Numbers:   Provided routinely in AVS     After hours:  [phone]    TREATMENT RISK STATEMENT:  The risks, benefits, alternatives and potential adverse effects have been discussed and are understood by the pt. The pt understands the risks of using street drugs or alcohol. There are no medical contraindications, the pt agrees to treatment with the ability to do so. The pt knows to call the clinic for any problems or to access emergency care if needed.  Medical and substance use concerns are documented above.  Psychotropic drug interaction check was done, including changes made today.    PROVIDER:  Yaneth Guzmán MD     The longitudinal plan of care for the diagnosis(es)/condition(s) as  documented were addressed during this visit. Due to the added complexity in care, I will continue to support Laura in the subsequent management and with ongoing continuity of care.

## 2024-06-18 ENCOUNTER — VIRTUAL VISIT (OUTPATIENT)
Dept: PSYCHIATRY | Facility: CLINIC | Age: 34
End: 2024-06-18
Payer: COMMERCIAL

## 2024-06-18 DIAGNOSIS — F29 PSYCHOSIS, UNSPECIFIED PSYCHOSIS TYPE (H): Primary | ICD-10-CM

## 2024-06-18 NOTE — PROGRESS NOTES
NAVIGJESUS Clinician Contact & Progress Note  For Individual Resiliency Training (IRT)  A Part of the [facility] First Episode of Psychosis Program    NAVIGATE Enrollee: Laura Webb (1990)     MRN: [id number]  Date:  6/18/24  Diagnosis:Psychosis, unspecified  Clinician: MILAN Individual Resiliency Trainer, LORELEI Carrera     1. Type of contact: (majority of time spent)  IRT Session via telehealth  Mode of communication: [employer] (HIPAA compliant, secure platform). Patient consented verbally to this mode of therapy today.  Reason for telehealth: To reduce barriers in accessing services, due to but not limited to transportation, location, or scheduling reasons.    2. People present:   Writer  Client: Yes    3. Length of Actual Contact: Start Time: 2:13; End Time: 2:47     4. Location of contact:  Originating Location (patient location): family home, located in [place], [place]  Distant Location (provider location): Home office, located in [place], [place], using appropriate privacy considerations and procedures    5. Did the client complete the home practice option(s) from the previous session: Completed    6. Motivational Teaching Strategies:  Connect info and skills with personal goals  Promote hope and positive expectations    7. Educational Teaching Strategies:  Review of written material/education  Relate information to client's experience  Ask questions to check comprehension  Break down information into small chunks    8. CBT Teaching Strategies:  Reinforcement and shaping (positive feedback for steps towards goals, gains in knowledge & skills and follow-through on home assignments)    9. IRT Module(s) Addressed:  Module 6 - Processing the episode    10. Techniques utilized:   [place] announced at beginning of session  Review of homework  Review of previous meeting  Present new material  Summarize progress made in current session  Other techniques (please specify):   -build rapport  "by discussing preferences, previous experiences of maribell, etc.  -Clarified patient's feelings and perspectives  -Elicited more details about current and recent circumstances  -Emotional support via active and reflective listening, responding to feelings etc.  -Identified patient's strengths/needs  -Identified goals for treatment plan  -Normalized and validated feelings and experiences  -Provided verbal overview of First Episode Program NAVIGATE services  -Provided positive feedback and encouragement  -Provided psychoeducation related to psychosis and related concerns.       11. Measures:    Mental Status Exam  Alertness: alert  and oriented  Behavior/Demeanor: cooperative, pleasant and calm  Speech: normal  Language: intact.   Mood: \"good\"  Thought Process/Associations: unremarkable  Thought Content:  Reports none;  Denies suicidal ideation and delusions  Perception:  Reports none;  Denies auditory hallucinations and visual hallucinations  Insight: adequate  Judgment: good and adequate for safety  Cognition: does  appear grossly intact; formal cognitive testing was not done    EMEKA-7  Not completed. Pt denied anxiety symptoms when asked.     PHQ-9  Not completed. Pt denied depressive symptoms when asked.     [place] Protocol Risk Identification  Pt denied SI when asked.     12. Assessment/Progress Note:     Writer and client met for IRT visit via zoom. Client experienced technical issues and was delayed in joined the visit. Set agenda to check in; continue to expand on IRT modules and material. Reported current symptoms to include: occasional lower mood and occasionally feeling overwhelmed and experiencing acute anxiety. Current stressors include: Pt occasionally has worries that her symptoms of psychosis may return, especially when she is alone and often in the mornings. Reported previously that she worries about this regularly and that some weeks are easier than other weeks.  In regards to medications, client " reports:no current concnerns. Reports no issues regarding sleep beyond noting that she seems to be tired and go to sleep earlier than she used to, but still sleeps well and now wakes earlier than she used to. Substance use: NA. Assessed safety. Client denies SI, denies intent, and denies plan. HI was not present. SIB was not present.Safety plan has been created in the past. Safety plan was not reviewed today and includes speaking with , speaking with brother or other family, calling crisis lines, going to the nearest ED if SI/HI/SIB becomes overwhelming, worsens and/or becomes active.     Client did not identify concern to be addressed today.     Today's visit:   Laura reports having had an good week. Upon further discussion, Laura denied psychosis, denied depressed mood, denied fear/anxiety attacks over the past weeks. Laura reports on-going worries and concerns about the previous episode and occasional intrusive thoughts about whether another episode will occur in the future. She has been using the new positive re framing thoughts when she has the worrisome and self-stigmatizing thoughts, which she reports has been helpful. Laura also reported spending about 15 minutes over the past week practicing attention focused meditation while praying. Regarding medication, Laura reports no concerns currently, discussed how she really appreciates the care she receives from staff where she receives her injections monthly. Discussed plan to taper down on dosage and discussed making a wellness plan in the coming weeks. Regarding work, Laura continues to look for suitable options. Reviewed the events of past week. Discussed things that she enjoyed in the past week. Spent some time reviewing good things, particularly daily interactions with her family. Continued to work on Telling Her Story. Writer posed prompting questions for Laura to continue reviewing her experiences before the episode. Will continue at next visit.  "    Previously discussed concept of radical acceptance and the benefit of focused meditation. Client agreed to attempt to meditate with the aim of redirecting her focus back to the anchor over the coming week.     Writer used relational and interpersonal approach to explore feelings, motivations, and behavior. Writer offered support, feedback, validation, and reinforced use of skills taught in IRT from modalities including cognitive behavioral therapy, psycho education, and skills training. Promoted understanding of their experiences of psychosis and how it impacts them in important areas of their life and in recovery goals. Reflected on client's strengths and resiliency factors and facilitated discussion on how these can assist in symptom management, recovery, and well-being.     Home practice identified as: Client agreed to attempt to meditate with the aim of redirecting her focus back to the anchor over the coming week.       13. Plan/Referrals:     Next visit scheduled for next week.     Billing for \"Interactive Complexity\"?    No    LORELEI Carrera Individual Resiliency Trainer      "

## 2024-06-25 ENCOUNTER — VIRTUAL VISIT (OUTPATIENT)
Dept: PSYCHIATRY | Facility: CLINIC | Age: 34
End: 2024-06-25
Payer: COMMERCIAL

## 2024-06-25 DIAGNOSIS — F29 PSYCHOSIS, UNSPECIFIED PSYCHOSIS TYPE (H): Primary | ICD-10-CM

## 2024-06-26 NOTE — PROGRESS NOTES
NAVIGJESUS Clinician Contact & Progress Note  For Individual Resiliency Training (IRT)  A Part of the [facility] First Episode of Psychosis Program    NAVIGATE Enrollee: Laura Webb (1990)     MRN: [id number]  Date:  6/25/24  Diagnosis:Psychosis, unspecified  Clinician: MILAN Individual Resiliency Trainer, LORELEI Carrera     1. Type of contact: (majority of time spent)  IRT Session via telehealth  Mode of communication: [employer] (HIPAA compliant, secure platform). Patient consented verbally to this mode of therapy today.  Reason for telehealth: To reduce barriers in accessing services, due to but not limited to transportation, location, or scheduling reasons.    2. People present:   Writer  Client: Yes    3. Length of Actual Contact: Start Time: 2:06; End Time: 2:53     4. Location of contact:  Originating Location (patient location): family home, located in [place], [place]  Distant Location (provider location): Home office, located in [place], [place], using appropriate privacy considerations and procedures    5. Did the client complete the home practice option(s) from the previous session: Completed    6. Motivational Teaching Strategies:  Connect info and skills with personal goals  Promote hope and positive expectations    7. Educational Teaching Strategies:  Review of written material/education  Relate information to client's experience  Ask questions to check comprehension  Break down information into small chunks    8. CBT Teaching Strategies:  Reinforcement and shaping (positive feedback for steps towards goals, gains in knowledge & skills and follow-through on home assignments)    9. IRT Module(s) Addressed:  Module 6 - Processing the episode    10. Techniques utilized:   [place] announced at beginning of session  Review of homework  Review of previous meeting  Present new material  Summarize progress made in current session  Other techniques (please specify):   -build rapport  "by discussing preferences, previous experiences of maribell, etc.  -Clarified patient's feelings and perspectives  -Elicited more details about current and recent circumstances  -Emotional support via active and reflective listening, responding to feelings etc.  -Identified patient's strengths/needs  -Identified goals for treatment plan  -Normalized and validated feelings and experiences  -Provided verbal overview of First Episode Program NAVIGATE services  -Provided positive feedback and encouragement  -Provided psychoeducation related to psychosis and related concerns.       11. Measures:    Mental Status Exam  Alertness: alert  and oriented  Behavior/Demeanor: cooperative, pleasant and calm  Speech: normal  Language: intact.   Mood: \"good\"  Thought Process/Associations: unremarkable  Thought Content:  Reports none;  Denies suicidal ideation and delusions  Perception:  Reports none;  Denies auditory hallucinations and visual hallucinations  Insight: adequate  Judgment: good and adequate for safety  Cognition: does  appear grossly intact; formal cognitive testing was not done    EMEKA-7  Not completed. Pt denied anxiety symptoms when asked.     PHQ-9  Not completed. Pt denied depressive symptoms when asked.     [place] Protocol Risk Identification  Pt denied SI when asked.     12. Assessment/Progress Note:     Writer and client met for IRT visit via zoom. Client experienced technical issues and was delayed in joined the visit. Set agenda to check in; continue to expand on IRT modules and material. Reported current symptoms to include: occasional lower mood and occasionally feeling overwhelmed and experiencing acute anxiety. Current stressors include: Pt occasionally has worries that her symptoms of psychosis may return, especially when she is alone and often in the mornings. Reported previously that she worries about this regularly and that some weeks are easier than other weeks.  In regards to medications, client " reports:no current concnerns. Reports no issues regarding sleep. Substance use: NA. Assessed safety. Client denies SI, denies intent, and denies plan. HI was not present. SIB was not present.Safety plan has been created in the past. Safety plan was not reviewed today and includes speaking with , speaking with brother or other family, calling crisis lines, going to the nearest ED if SI/HI/SIB becomes overwhelming, worsens and/or becomes active.     Client did not identify concern to be addressed today.     Today's visit:   Laura reports having had an good week. Upon further discussion, Laura denied psychosis, denied depressed mood, denied fear/anxiety attacks over the past weeks. Laura reports on-going worries and concerns about the previous episode and occasional intrusive thoughts about whether another episode will occur in the future. She has been using the new positive re framing thoughts when she has the worrisome and self-stigmatizing thoughts, which she reports has been helpful. Laura also reported spending about 15 minutes over the past week practicing attention focused meditation while praying. Writer and client reflected on how much improvement Laura has made over the past 6 months in the ability to redirect her attention from worrisome thoughts to more positive thoughts. Regarding medication, Laura reports no concerns currently, discussed how she really appreciates the care she receives from staff where she receives her injections monthly. Discussed plan to taper down on dosage and discussed making a wellness plan in the coming weeks. Regarding work, Laura continues to look for suitable options. Reviewed the events of past week. Discussed things that she enjoyed in the past week. Spent some time reviewing good things, particularly daily interactions with her family. Continued to work on Telling Her Story. Writer posed prompting questions for Laura to continue reviewing her experiences before the  "episode. Spent time at end of visit gauging level of regulation and identifying coping for remainder of the day. Will continue at next visit.     Previously discussed concept of radical acceptance and the benefit of focused meditation. Client agreed to attempt to meditate with the aim of redirecting her focus back to the anchor over the coming week.     Writer used relational and interpersonal approach to explore feelings, motivations, and behavior. Writer offered support, feedback, validation, and reinforced use of skills taught in IRT from modalities including cognitive behavioral therapy, psycho education, and skills training. Promoted understanding of their experiences of psychosis and how it impacts them in important areas of their life and in recovery goals. Reflected on client's strengths and resiliency factors and facilitated discussion on how these can assist in symptom management, recovery, and well-being.     Home practice identified as: Client agreed to attempt to meditate with the aim of redirecting her focus back to the anchor over the coming week.       13. Plan/Referrals:     Next visit scheduled for next week.     Billing for \"Interactive Complexity\"?    No    LORELEI Carrera Individual Resiliency Trainer      "

## 2024-07-02 ENCOUNTER — VIRTUAL VISIT (OUTPATIENT)
Dept: PSYCHIATRY | Facility: CLINIC | Age: 34
End: 2024-07-02
Payer: COMMERCIAL

## 2024-07-02 DIAGNOSIS — F29 PSYCHOSIS, UNSPECIFIED PSYCHOSIS TYPE (H): Primary | ICD-10-CM

## 2024-07-03 ENCOUNTER — TELEPHONE (OUTPATIENT)
Dept: PSYCHIATRY | Facility: CLINIC | Age: 34
End: 2024-07-03

## 2024-07-03 NOTE — PROGRESS NOTES
NAVIGJESUS Clinician Contact & Progress Note  For Individual Resiliency Training (IRT)  A Part of the [facility] First Episode of Psychosis Program    NAVIGATE Enrollee: Laura Webb (1990)     MRN: [id number]  Date:  7/02/24  Diagnosis:Psychosis, unspecified  Clinician: MILAN Individual Resiliency Trainer, LORELEI Carrera     1. Type of contact: (majority of time spent)  IRT Session via telehealth  Mode of communication: [employer] (HIPAA compliant, secure platform). Patient consented verbally to this mode of therapy today.  Reason for telehealth: To reduce barriers in accessing services, due to but not limited to transportation, location, or scheduling reasons.    2. People present:   Writer  Client: Yes    3. Length of Actual Contact: Start Time: 2:09-2:20; then 2:32-2:46; End Time: 2:46     4. Location of contact:  Originating Location (patient location): family car, parked, in passenger seat, located in [place], [place]  Distant Location (provider location): Home office, located in [place], [place], using appropriate privacy considerations and procedures    5. Did the client complete the home practice option(s) from the previous session: Completed    6. Motivational Teaching Strategies:  Connect info and skills with personal goals  Promote hope and positive expectations    7. Educational Teaching Strategies:  Review of written material/education  Relate information to client's experience  Ask questions to check comprehension  Break down information into small chunks    8. CBT Teaching Strategies:  Reinforcement and shaping (positive feedback for steps towards goals, gains in knowledge & skills and follow-through on home assignments)    9. IRT Module(s) Addressed:  Module 6 - Processing the episode    10. Techniques utilized:   [place] announced at beginning of session  Review of homework  Review of previous meeting  Present new material  Summarize progress made in current session  Other  "techniques (please specify):   -build rapport by discussing preferences, previous experiences of maribell, etc.  -Clarified patient's feelings and perspectives  -Elicited more details about current and recent circumstances  -Emotional support via active and reflective listening, responding to feelings etc.  -Identified patient's strengths/needs  -Identified goals for treatment plan  -Normalized and validated feelings and experiences  -Provided verbal overview of First Episode Program [facility] services  -Provided positive feedback and encouragement  -Provided psychoeducation related to psychosis and related concerns.       11. Measures:    Mental Status Exam  Alertness: alert  and oriented  Behavior/Demeanor: cooperative, pleasant and calm  Speech: normal  Language: intact.   Mood: \"good\"  Thought Process/Associations: unremarkable  Thought Content:  Reports none;  Denies suicidal ideation and delusions  Perception:  Reports none;  Denies auditory hallucinations and visual hallucinations  Insight: adequate  Judgment: good and adequate for safety  Cognition: does  appear grossly intact; formal cognitive testing was not done    EMEKA-7  Not completed. Pt denied anxiety symptoms when asked.     PHQ-9  Not completed. Pt denied depressive symptoms when asked.     [place] Protocol Risk Identification  Pt denied SI when asked.     12. Assessment/Progress Note:     Writer and client met for IRT visit via zoom. Client experienced technical issues and was delayed in joined the visit. Set agenda to check in; continue to expand on IRT modules and material. Reported current symptoms to include: occasional lower mood and occasionally feeling overwhelmed and experiencing acute anxiety. Current stressors include: Pt occasionally has worries that her symptoms of psychosis may return, especially when she is alone and often in the mornings. Reported previously that she worries about this regularly and that some weeks are easier than other " weeks.  In regards to medications, client reports:no current concnerns. Reports no issues regarding sleep. Substance use: NA. Assessed safety. Client denies SI, denies intent, and denies plan. HI was not present. SIB was not present.Safety plan has been created in the past. Safety plan was not reviewed today and includes speaking with , speaking with brother or other family, calling crisis lines, going to the nearest ED if SI/HI/SIB becomes overwhelming, worsens and/or becomes active.     Client did not identify concern to be addressed today.     Today's visit:   Laura reports having had an okay week. Upon further discussion, Laura denied psychosis, denied depressed mood, denied fear/anxiety attacks over the past weeks. Laura reports on-going worries and concerns about the previous episode and occasional intrusive thoughts about whether another episode will occur in the future. She has been using the new positive re framing thoughts when she has the worrisome and self-stigmatizing thoughts, which she reports has been helpful. Laura also reported spending about 5 minutes over the past week practicing attention focused meditation while praying. Regarding medication, Laura reports no concerns currently. Discussed plan to taper down on dosage and discussed making a wellness plan in the coming weeks. Regarding work, Laura continues to look for suitable options. Reviewed the events of past week. Discussed having had a quiet week. 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 Writer and client spent time discussing the impending arrival of in-laws, (they will be staying from July 5th through October. Discussed worries that client has and strategies to address worries. Will continue at next visit.     Writer shared update about my practice - discussed that this writer will be leaving [facility], discussed plan for the time remaining for this writer to work with client.  Client expressed understanding.     Previously discussed  "concept of radical acceptance and the benefit of focused meditation. Client agreed to attempt to meditate with the aim of redirecting her focus back to the anchor over the coming week.     Writer used relational and interpersonal approach to explore feelings, motivations, and behavior. Writer offered support, feedback, validation, and reinforced use of skills taught in IRT from modalities including cognitive behavioral therapy, psycho education, and skills training. Promoted understanding of their experiences of psychosis and how it impacts them in important areas of their life and in recovery goals. Reflected on client's strengths and resiliency factors and facilitated discussion on how these can assist in symptom management, recovery, and well-being.     Home practice identified as: Client agreed to attempt to meditate with the aim of redirecting her focus back to the anchor over the coming week.       13. Plan/Referrals:     Next visit scheduled for one week from today. Writer informed client's family that my last day with be 7/26/24, discussed what we will spend our last visits going over. Client expressed understanding.    Billing for \"Interactive Complexity\"?    No    LORELEI Carrera Individual Resiliency Trainer      "

## 2024-07-09 ENCOUNTER — VIRTUAL VISIT (OUTPATIENT)
Dept: PSYCHIATRY | Facility: CLINIC | Age: 34
End: 2024-07-09
Payer: COMMERCIAL

## 2024-07-09 DIAGNOSIS — F29 PSYCHOSIS, UNSPECIFIED PSYCHOSIS TYPE (H): Primary | ICD-10-CM

## 2024-07-10 NOTE — PROGRESS NOTES
NAVIGJESUS Clinician Contact & Progress Note  For Individual Resiliency Training (IRT)  A Part of the [facility] First Episode of Psychosis Program    NAVIGATE Enrollee: Laura Webb (1990)     MRN: [id number]  Date:  7/09/24  Diagnosis:Psychosis, unspecified  Clinician: MILAN Individual Resiliency Trainer, LORELEI Carrera     1. Type of contact: (majority of time spent)  IRT Session via telehealth  Mode of communication: [employer] Well (HIPAA compliant, secure platform). Patient consented verbally to this mode of therapy today.  Reason for telehealth: To reduce barriers in accessing services, due to but not limited to transportation, location, or scheduling reasons.    2. People present:   Writer  Client: Yes    3. Length of Actual Contact: Start Time: 2:10; End Time: 2:49     4. Location of contact:  Originating Location (patient location): family car, parked, in passenger seat, located in [place], [place]  Distant Location (provider location): Home office, located in [place], [place], using appropriate privacy considerations and procedures    5. Did the client complete the home practice option(s) from the previous session: Completed    6. Motivational Teaching Strategies:  Connect info and skills with personal goals  Promote hope and positive expectations    7. Educational Teaching Strategies:  Review of written material/education  Relate information to client's experience  Ask questions to check comprehension  Break down information into small chunks    8. CBT Teaching Strategies:  Reinforcement and shaping (positive feedback for steps towards goals, gains in knowledge & skills and follow-through on home assignments)    9. IRT Module(s) Addressed:  Module 6 - Processing the episode    10. Techniques utilized:   [place] announced at beginning of session  Review of homework  Review of previous meeting  Present new material  Summarize progress made in current session  Other techniques (please  "specify):   -build rapport by discussing preferences, previous experiences of maribell, etc.  -Clarified patient's feelings and perspectives  -Elicited more details about current and recent circumstances  -Emotional support via active and reflective listening, responding to feelings etc.  -Identified patient's strengths/needs  -Identified goals for treatment plan  -Normalized and validated feelings and experiences  -Provided verbal overview of First Episode Program [facility] services  -Provided positive feedback and encouragement  -Provided psychoeducation related to psychosis and related concerns.       11. Measures:    Mental Status Exam  Alertness: alert  and oriented  Behavior/Demeanor: cooperative, pleasant and calm  Speech: normal  Language: intact.   Mood: \"good\"  Thought Process/Associations: unremarkable  Thought Content:  Reports none;  Denies suicidal ideation and delusions  Perception:  Reports none;  Denies auditory hallucinations and visual hallucinations  Insight: adequate  Judgment: good and adequate for safety  Cognition: does  appear grossly intact; formal cognitive testing was not done    EMEKA-7  Not completed. Pt denied anxiety symptoms when asked.     PHQ-9  Not completed. Pt denied depressive symptoms when asked.     [place] Protocol Risk Identification  Pt denied SI when asked.     12. Assessment/Progress Note:     Writer and client met for IRT visit via zoom. Client experienced technical issues and was delayed in joined the visit. Set agenda to check in; continue to expand on IRT modules and material. Reported current symptoms to include: occasional lower mood and occasionally feeling overwhelmed and experiencing acute anxiety. Current stressors include: Pt occasionally has worries that her symptoms of psychosis may return, especially when she is alone and often in the mornings. Reported previously that she worries about this regularly and that some weeks are easier than other weeks.  In regards to " medications, client reports:no current concnerns. Reports no issues regarding sleep. Substance use: NA. Assessed safety. Client denies SI, denies intent, and denies plan. HI was not present. SIB was not present.Safety plan has been created in the past. Safety plan was not reviewed today and includes speaking with , speaking with brother or other family, calling crisis lines, going to the nearest ED if SI/HI/SIB becomes overwhelming, worsens and/or becomes active.     Client did not identify concern to be addressed today.     Today's visit:   Laura reports having had an okay week. Upon further discussion, Laura denied psychosis, denied depressed mood, denied fear/anxiety attacks over the past weeks. Laura reports on-going worries and concerns about the previous episode and occasional intrusive thoughts about whether another episode will occur in the future, but noted that they were fewer and less intense. She has been using the new positive re framing thoughts when she has the worrisome and self-stigmatizing thoughts, which she reports has been helpful. Laura also reported spending time every day over the past week practicing attention focused meditation while praying. Regarding medication, Laura reports no concerns currently. Discussed having taken a lesser dosage at her most recent injection and notes no changes regarding symptoms. Regarding work, Laura continues to look for suitable options. Reviewed the events of past week. Discussed having had a quiet week. 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 Writer and client spent time discussing her goals and reviewing her treatment plan, as well as discussing group options. Will continue at next visit.     Writer previously shared update about my practice - discussed that this writer will be leaving [facility], discussed plan for the time remaining for this writer to work with client.  Client expressed understanding.     Previously discussed concept of radical acceptance  and the benefit of focused meditation. Client agreed to attempt to meditate with the aim of redirecting her focus back to the anchor over the coming week.     Writer used relational and interpersonal approach to explore feelings, motivations, and behavior. Writer offered support, feedback, validation, and reinforced use of skills taught in IRT from modalities including cognitive behavioral therapy, psycho education, and skills training. Promoted understanding of their experiences of psychosis and how it impacts them in important areas of their life and in recovery goals. Reflected on client's strengths and resiliency factors and facilitated discussion on how these can assist in symptom management, recovery, and well-being.     Home practice identified as: Client agreed to attempt to meditate with the aim of redirecting her focus back to the anchor over the coming week.     Sent email to Pt regarding clinical group options:  Aurelio Sanchez,     We talked about group options yesterday - here are the groups we discussed that you might be interested in (also attached flyers for each group):    - DBT for Psychosis Group is Mondays 2-3pm virtually. This is a group to help you learn skills to manage emotional distress and symptoms of psychosis. This group is billed to insurance. Please let your therapist or psychiatrist know if you are interested and they will contact group leaders, LORELEI Fonseca and Ashlyn Montoya, to add you to the group schedule.       - First Episode Psychosis Young Adult Group Thursdays at 4:00 PM, virtually. This is a group that gives you therapeutic skills to manage difficult symptoms and process your experiences with other young people with similar symptoms. This group is billed to insurance. Please contact the [facility] [place] [facility] at [phone] for more information.      - Writing [place] is starting at a new day and time; please call [phone] to request additional  information. The [facility] [place] [place] is a therapeutic space co-led by peers to explore writing and self-expression in a supportive group setting with other peers from [facility]'s [facility] & Strengths programs. Open group style, come in and leave at any time. This group meets weekly at the [facility] clinic ([facility], [address]nd Floor, [address] [place] [id number]).    Warmest regards,   Park Rush, MSW, LICSW     13. Treatment plan:   [facility] Program IRT Treatment Plan Summary  A Part of the [facility] First Episode of Psychosis Program       Date of Initial IRT session: 8/29/2023  Date of INTIAL Treatment Plan: 9/14/2023  Last Review/Update Date:  7/9/24  Today's Date: 7/10/2024   Next 90-Day Review Due:  10/8/24    The following represents REVIEWED treatment plan.    Individual Resiliency Training Treatment Goals     Measurable Objectives Interventions Target Dates & Discharge Criteria   Individual s Objectives    Individual's Objectives       Identify psychosocial areas of need (current area of need is that she has been used to caring for others, and she needs to understand how to receive support from others).  Renew two old fun activities or develop new fun activities ceramics class?  Obtain/maintain employment      In Laura's own words:  I want a certain peace where I am not worried about it happening again.  IRT/Psychotherapy  -Psychoeducation  -Motivation interviewing  -CBT  -Behavioral activation    Coordination with other [facility] Team Members:  - Supported Education and Employment  - Medication Management  - Family Education and Support  - Social Work Care Coordination Target date:   12-36 months from enrollment    Discharge criteria:  Marked and sustained symptom improvement     Laura demonstrates understanding of mental illness     Laura successfully implements strategies to cope with stressors and/or symptoms to mitigate risk for increase in symptom severity or  "relapse    Gains Made:  Identify top 5 strengths and use for goals.  Demonstrate understanding of symptoms regarding causes, treatment.  Gained a level of comfort in discussing her experiences.          Frequency of sessions and expected duration of treatment:   6-12 months of weekly IRT/Individual Psychotherapy followed by 12-24 months of biweekly or monthly IRT  1-4x per month Medication Management with Prescriber ongoing   Other services as client wishes to engage in them.      Participants in therapy plan:   Laura [place]     Support System: Laura's , Laura's siblings, community through her [employer]. [facility] team.       See signed Acknowledgement of Current Treatment Plan attached to this visit in patient chart (via \"consents\" tab).     Regulatory Guidelines for Updating Treatment Plan  [place] Medical Assistance: Reviewed & signed at least every 90 days  Medicare:  Update per policy      14. Plan/Referrals:     Next visit scheduled for one week from today. Writer informed client's family that my last day with be 7/26/24, discussed what we will spend our last visits going over. Client expressed understanding.    Billing for \"Interactive Complexity\"?    No    LORELEI CarreraATE Individual Resiliency Trainer      "

## 2024-07-16 ENCOUNTER — VIRTUAL VISIT (OUTPATIENT)
Dept: PSYCHIATRY | Facility: CLINIC | Age: 34
End: 2024-07-16
Payer: COMMERCIAL

## 2024-07-16 DIAGNOSIS — F29 PSYCHOSIS, UNSPECIFIED PSYCHOSIS TYPE (H): Primary | ICD-10-CM

## 2024-07-17 NOTE — PROGRESS NOTES
NAVIGJESUS Clinician Contact & Progress Note  For Individual Resiliency Training (IRT)  A Part of the [facility] First Episode of Psychosis Program    NAVIGATE Enrollee: Laura Webb (1990)     MRN: [id number]  Date:  7/16/24  Diagnosis:Psychosis, unspecified  Clinician: MILAN Individual Resiliency Trainer, LORELEI Carrera     1. Type of contact: (majority of time spent)  IRT Session via telehealth  Mode of communication: [employer] (HIPAA compliant, secure platform). Patient consented verbally to this mode of therapy today.  Reason for telehealth: To reduce barriers in accessing services, due to but not limited to transportation, location, or scheduling reasons.    2. People present:   Writer  Client: Yes    3. Length of Actual Contact: Start Time: 2:09; End Time: 2:55     4. Location of contact:  Originating Location (patient location): family car, parked, in passenger seat, located in [place], [place]  Distant Location (provider location): Home office, located in [place], [place], using appropriate privacy considerations and procedures    5. Did the client complete the home practice option(s) from the previous session: Completed    6. Motivational Teaching Strategies:  Connect info and skills with personal goals  Promote hope and positive expectations    7. Educational Teaching Strategies:  Review of written material/education  Relate information to client's experience  Ask questions to check comprehension  Break down information into small chunks    8. CBT Teaching Strategies:  Reinforcement and shaping (positive feedback for steps towards goals, gains in knowledge & skills and follow-through on home assignments)    9. IRT Module(s) Addressed:  Module 6 - Processing the episode    10. Techniques utilized:   [place] announced at beginning of session  Review of homework  Review of previous meeting  Present new material  Summarize progress made in current session  Other techniques (please  "specify):   -build rapport by discussing preferences, previous experiences of maribell, etc.  -Clarified patient's feelings and perspectives  -Elicited more details about current and recent circumstances  -Emotional support via active and reflective listening, responding to feelings etc.  -Identified patient's strengths/needs  -Identified goals for treatment plan  -Normalized and validated feelings and experiences  -Provided verbal overview of First Episode Program [facility] services  -Provided positive feedback and encouragement  -Provided psychoeducation related to psychosis and related concerns.       11. Measures:    Mental Status Exam  Alertness: alert  and oriented  Behavior/Demeanor: cooperative, pleasant and calm  Speech: normal  Language: intact.   Mood: \"good\"  Thought Process/Associations: unremarkable  Thought Content:  Reports none;  Denies suicidal ideation and delusions  Perception:  Reports none;  Denies auditory hallucinations and visual hallucinations  Insight: adequate  Judgment: good and adequate for safety  Cognition: does  appear grossly intact; formal cognitive testing was not done    EMEKA-7  Not completed. Pt denied anxiety symptoms when asked.     PHQ-9  Not completed. Pt denied depressive symptoms when asked.     [place] Protocol Risk Identification  Pt denied SI when asked.     12. Assessment/Progress Note:     Writer and client met for IRT visit via zoom. Client experienced technical issues and was delayed in joined the visit. Set agenda to check in; continue to expand on IRT modules and material. Reported current symptoms to include: occasional lower mood and occasionally feeling overwhelmed and experiencing acute anxiety. Current stressors include: Pt occasionally has worries that her symptoms of psychosis may return, especially when she is alone and often in the mornings. Reported previously that she worries about this regularly and that some weeks are easier than other weeks.  In regards to " medications, client reports:no current concnerns. Reports no issues regarding sleep. Substance use: NA. Assessed safety. Client denies SI, denies intent, and denies plan. HI was not present. SIB was not present.Safety plan has been created in the past. Safety plan was not reviewed today and includes speaking with , speaking with brother or other family, calling crisis lines, going to the nearest ED if SI/HI/SIB becomes overwhelming, worsens and/or becomes active.     Client did not identify concern to be addressed today.     Today's visit:   Laura reports having had an okay week. Upon further discussion, Laura denied psychosis, denied depressed mood, denied fear/anxiety attacks over the past weeks. Laura reports on-going worries and concerns about the previous episode and occasional intrusive thoughts about whether another episode will occur in the future, but noted that they were fewer and less intense. She has been using the new positive re framing thoughts when she has the worrisome and self-stigmatizing thoughts, which she reports has been helpful. Laura also reported spending time every day over the past week practicing attention focused meditation while praying daily. Regarding medication, Laura reports no concerns currently. Discussed having taken a lesser dosage at her most recent injection and notes no changes regarding symptoms. Regarding work, Laura continues to look for suitable options. Reviewed the events of past week. Discussed having had a busy week and noted a dinner for a family member's birthday. 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 Writer and client spent time discussing mindfulness practice that can be shared with whole family (including children) and assessing Laura's self-care across various domains in her life. Will continue at next visit.     On 7/2, writer previously shared update about my practice - discussed that this writer will be leaving [facility], discussed plan for the time  remaining for this writer to work with client.  Client expressed understanding.     Previously discussed concept of radical acceptance and the benefit of focused meditation. Client agreed to attempt to meditate with the aim of redirecting her focus back to the anchor over the coming week.     Writer used relational and interpersonal approach to explore feelings, motivations, and behavior. Writer offered support, feedback, validation, and reinforced use of skills taught in IRT from modalities including cognitive behavioral therapy, psycho education, and skills training. Promoted understanding of their experiences of psychosis and how it impacts them in important areas of their life and in recovery goals. Reflected on client's strengths and resiliency factors and facilitated discussion on how these can assist in symptom management, recovery, and well-being.     Home practice identified as: Client agreed to attempt to meditate with the aim of redirecting her focus back to the anchor over the coming week.     Sent email to Pt with resource for teaching mindfulness to children.    13. Treatment plan:   [facility] NAVIG[facility] Program IRT Treatment Plan Summary  A Part of the [facility] First Episode of Psychosis Program       Date of Initial IRT session: 8/29/2023  Date of INTIAL Treatment Plan: 9/14/2023  Last Review/Update Date:  7/9/24  Today's Date: 7/10/2024   Next 90-Day Review Due:  10/8/24    The following represents REVIEWED treatment plan.    Individual Resiliency Training Treatment Goals     Measurable Objectives Interventions Target Dates & Discharge Criteria   Individual s Objectives    Individual's Objectives       Identify psychosocial areas of need (current area of need is that she has been used to caring for others, and she needs to understand how to receive support from others).  Renew two old fun activities or develop new fun activities ceramics class?  Obtain/maintain employment      In Laura's own words:  I want  "a certain peace where I am not worried about it happening again.  IRT/Psychotherapy  -Psychoeducation  -Motivation interviewing  -CBT  -Behavioral activation    Coordination with other [facility] Team Members:  - Supported Education and Employment  - Medication Management  - Family Education and Support  - Social Work Care Coordination Target date:   12-36 months from enrollment    Discharge criteria:  Marked and sustained symptom improvement     Laura demonstrates understanding of mental illness     Laura successfully implements strategies to cope with stressors and/or symptoms to mitigate risk for increase in symptom severity or relapse    Gains Made:  Identify top 5 strengths and use for goals.  Demonstrate understanding of symptoms regarding causes, treatment.  Gained a level of comfort in discussing her experiences.          Frequency of sessions and expected duration of treatment:   6-12 months of weekly IRT/Individual Psychotherapy followed by 12-24 months of biweekly or monthly IRT  1-4x per month Medication Management with Prescriber ongoing   Other services as client wishes to engage in them.      Participants in therapy plan:   Laura RADHA Webb     Support System: Laura's , Laura's siblings, community through her [employer]. [facility] team.       See signed Acknowledgement of Current Treatment Plan attached to this visit in patient chart (via \"consents\" tab).     Regulatory Guidelines for Updating Treatment Plan  [place] Medical Assistance: Reviewed & signed at least every 90 days  Medicare:  Update per policy      14. Plan/Referrals:     Next visit scheduled for one week from today. Writer informed client's family that my last day with be 7/26/24, discussed what we will spend our last visits going over. Client expressed understanding.    Billing for \"Interactive Complexity\"?    No    LORELEI Carrera Individual Resiliency Trainer      "

## 2024-07-19 ENCOUNTER — VIRTUAL VISIT (OUTPATIENT)
Dept: PSYCHIATRY | Facility: CLINIC | Age: 34
End: 2024-07-19
Payer: COMMERCIAL

## 2024-07-19 DIAGNOSIS — F43.10 PTSD (POST-TRAUMATIC STRESS DISORDER): ICD-10-CM

## 2024-07-19 DIAGNOSIS — F29 PSYCHOSIS, UNSPECIFIED PSYCHOSIS TYPE (H): ICD-10-CM

## 2024-07-19 RX ORDER — PALIPERIDONE PALMITATE 117 MG/.75ML
117 INJECTION INTRAMUSCULAR
Qty: 0.75 ML | Refills: 2 | OUTPATIENT
Start: 2024-07-19

## 2024-07-19 ASSESSMENT — ANXIETY QUESTIONNAIRES
GAD7 TOTAL SCORE: 0
1. FEELING NERVOUS, ANXIOUS, OR ON EDGE: NOT AT ALL
7. FEELING AFRAID AS IF SOMETHING AWFUL MIGHT HAPPEN: NOT AT ALL
GAD7 TOTAL SCORE: 0
2. NOT BEING ABLE TO STOP OR CONTROL WORRYING: NOT AT ALL
6. BECOMING EASILY ANNOYED OR IRRITABLE: NOT AT ALL
IF YOU CHECKED OFF ANY PROBLEMS ON THIS QUESTIONNAIRE, HOW DIFFICULT HAVE THESE PROBLEMS MADE IT FOR YOU TO DO YOUR WORK, TAKE CARE OF THINGS AT HOME, OR GET ALONG WITH OTHER PEOPLE: NOT DIFFICULT AT ALL
3. WORRYING TOO MUCH ABOUT DIFFERENT THINGS: NOT AT ALL
5. BEING SO RESTLESS THAT IT IS HARD TO SIT STILL: NOT AT ALL

## 2024-07-19 ASSESSMENT — PATIENT HEALTH QUESTIONNAIRE - PHQ9
5. POOR APPETITE OR OVEREATING: NOT AT ALL
SUM OF ALL RESPONSES TO PHQ QUESTIONS 1-9: 0

## 2024-07-19 NOTE — NURSING NOTE
Is the patient currently in the state of [place]? YES    Visit mode:VIDEO    If the visit is dropped, the patient can be reconnected by: TELEPHONE VISIT: Phone number: [phone]    Will anyone else be joining the visit? NO      How would you like to obtain your AVS? MyChart    Are changes needed to the allergy or medication list? NO    Reason for visit: Follow Up      NAVIGATE Patient Self-Rating Form    Since your last medication management visit--    Have you been feeling depressed, sad, or down? No  Have you been feeling anxious, worried or nervous? No  Have you been thinking about death or have you had any feelings that you would be better off dead? No   Have you been feeling particularly good? Yes  Have you been feeling annoyed, angry, or resentful (whether you showed it or not)? No  Did you do anything that could have gotten you in trouble? No  Have you felt dizzy or faint? No  Have you had blurred vision? No  Have you had dry mouth? No  Have you had too much saliva in your mouth or had drooling? No  Have you felt nauseous? No  Have you been constipated? No  Has you appetite for food been increased? No  Have you gained weight? No  Have you lost weight? No  Have you felt restless or like you cannot sit still? No  Any shaking of your hands, legs, or other muscles? No  Any problems walking or moving or any problems feeling stiff or rigid? No  Have you felt tired or fatigued? No  Have you felt drowsy during the day? No  Have you been sleeping too much at night? No  Have you been sleeping too little or had problems sleeping at night? No  Any decrease in your interest in sex? No  Any other problems with sex? No  Any problems with your breasts such as swelling or discharge? No  For women, any problems with your period? No  Are there other medical or side effect problems you wish to discuss with your prescriber? No  Since your last visit, how many days have you not taken your medication? 0  Have you had trouble  remembering to take your medication? No  Do you find the number of medicines or the times when you are supposed to take then confusing or burdensome? No  Are you afraid of the medication? No  Do you think that you have an illness that requires taking medication? Yes  Do you think that other people would think poorly of you if they knew that you take medication? No  On average, how many cigarettes do you smoke per day? N/A  Since you last visit, did you drink alcohol? No  Since your last visit, have you used any marijuana? No  Since your last visit, have you used any stress drugs other than marijuana? No  Between now and your next visit, do you think we should keep your medication the same or consider changing the medications? Keep them the same.  Laura Alarcon, CMA
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
## 2024-07-23 ENCOUNTER — VIRTUAL VISIT (OUTPATIENT)
Dept: PSYCHIATRY | Facility: CLINIC | Age: 34
End: 2024-07-23
Payer: COMMERCIAL

## 2024-07-23 DIAGNOSIS — F29 PSYCHOSIS, UNSPECIFIED PSYCHOSIS TYPE (H): Primary | ICD-10-CM

## 2024-07-23 NOTE — PROGRESS NOTES
NAVIGJESUS Clinician Contact & Progress Note  For Individual Resiliency Training (IRT)  A Part of the [facility] First Episode of Psychosis Program    NAVIGATE Enrollee: Laura Webb (1990)     MRN: [id number]  Date:  7/23/24  Diagnosis:Psychosis, unspecified  Clinician: [facility] Individual Resiliency Trainer, LORELEI Carrera     1. Type of contact: (majority of time spent)  IRT Session via telehealth  Mode of communication: [employer] (HIPAA compliant, secure platform). Patient consented verbally to this mode of therapy today.  Reason for telehealth: To reduce barriers in accessing services, due to but not limited to transportation, location, or scheduling reasons.    2. People present:   Writer  Client: Yes    3. Length of Actual Contact: Start Time: 2:04; End Time: 3:02     4. Location of contact:  Originating Location (patient location): family car, parked, in passenger seat, located in [place], [place]  Distant Location (provider location): Home office, located in [place], [place], using appropriate privacy considerations and procedures    5. Did the client complete the home practice option(s) from the previous session: Completed    6. Motivational Teaching Strategies:  Connect info and skills with personal goals  Promote hope and positive expectations    7. Educational Teaching Strategies:  Review of written material/education  Relate information to client's experience  Ask questions to check comprehension  Break down information into small chunks    8. CBT Teaching Strategies:  Reinforcement and shaping (positive feedback for steps towards goals, gains in knowledge & skills and follow-through on home assignments)    9. IRT Module(s) Addressed:  Module 6 - Processing the episode    10. Techniques utilized:   [place] announced at beginning of session  Review of homework  Review of previous meeting  Present new material  Summarize progress made in current session  Other techniques (please  "specify):   -build rapport by discussing preferences, previous experiences of maribell, etc.  -Clarified patient's feelings and perspectives  -Elicited more details about current and recent circumstances  -Emotional support via active and reflective listening, responding to feelings etc.  -Identified patient's strengths/needs  -Identified goals for treatment plan  -Normalized and validated feelings and experiences  -Provided verbal overview of First Episode Program NAVIGATE services  -Provided positive feedback and encouragement  -Provided psychoeducation related to psychosis and related concerns.       11. Measures:    Mental Status Exam  Alertness: alert  and oriented  Behavior/Demeanor: cooperative, pleasant and calm  Speech: normal  Language: intact.   Mood: \"good\"  Thought Process/Associations: unremarkable  Thought Content:  Reports none;  Denies suicidal ideation and delusions  Perception:  Reports none;  Denies auditory hallucinations and visual hallucinations  Insight: adequate  Judgment: good and adequate for safety  Cognition: does  appear grossly intact; formal cognitive testing was not done    EMEKA-7  Not completed. Pt denied anxiety symptoms when asked.     PHQ-9  Not completed. Pt denied depressive symptoms when asked.     [place] Protocol Risk Identification  Pt denied SI when asked.     12. Assessment/Progress Note:     Writer and client met for IRT visit via [employer]. C Set agenda to check in; review the transition plan, review what has been done in NAVIGATE so far, and complete self care assessment. Reported current symptoms to include: occasional lower mood and occasionally feeling overwhelmed and experiencing acute anxiety. Current stressors include: Pt occasionally has worries that her symptoms of psychosis may return, especially when she is alone and often in the mornings. Reported previously that she worries about this regularly and that some weeks are easier than other weeks.  In regards to " medications, client reports:no current concnerns. Reports no issues regarding sleep. Substance use: NA. Assessed safety. Client denies SI, denies intent, and denies plan. HI was not present. SIB was not present.Safety plan has been created in the past. Safety plan was not reviewed today and includes speaking with , speaking with brother or other family, calling crisis lines, going to the nearest ED if SI/HI/SIB becomes overwhelming, worsens and/or becomes active.     Client did not identify concern to be addressed today.     Laura reports having had a tough week, noting that she is tired and worrisome thoughts are heavier. Upon further discussion, Laura reports on-going worries and concerns about the previous episode and occasional intrusive thoughts about whether another episode will occur in the future. She has been using the new positive re framing thoughts when she has the worrisome and self-stigmatizing thoughts, which she reports has been helpful, but the thoughts are still present. Discussed what other coping strategies Laura was using, including distraction, keeping busy, spending time with family, prayer, and meditation. Writer provided education about attention focused meditation and shared 'Leaves on a Stream' exercise with her.  Regarding medication, Laura reports no concerns currently. Discussed having taken a lesser dosage at her most recent injection and notes no initial changes regarding symptoms for the first two weeks after the lower dose. Regarding work, Laura continues to look for suitable options ad interviewed with [employer] over the phone.     Previously discussed concept of radical acceptance and the benefit of focused meditation. Client agreed to continue to meditate with the aim of redirecting her focus back to the anchor over the coming week. Writer introduced ' leaves on a stream' exercise to client.     Completed self-care assessment and writer sent Laura short list of self-care  tasks that she wants to increase in her life.     On 7/2, writer shared update about my practice - discussed that this writer will be leaving [facility], discussed plan for the time remaining for this writer to work with client.  Client expressed understanding. Today, Laura and arletter briefly reviewed timeline of this writer ending work with [facility], acknowledged that this visit is the last visit with this writer, and discussed expectations over the coming few weeks. [facility] is in process of interviewing and hiring.  The on-boarding of new staff will then take a few weeks, which will lead to a pause on individual therapy until the new clinical is fully on-boarded. Once the new staff starts, they will reach out to introduce themselves and discuss scheduling. During the pause, clients can attend family visits (was reminded that she can set these up if she and her  or brother want), groups (Laura plans to attend [facility] support group), and engage with SEE. Clients can call [facility] at [phone], ask to speak to the [facility] nurse(s), and share information or request a callback or check in with appropriate staff. At the end of the visit, writer expressed gratitude for being allowed to be a part of the care team, and wished client all the best in the future.    Reviewed modules that have been completed. Discussed some modules that will still be covered. Reflected on progress that Laura has already achieved. Writer expressed gratitude and hope for Laura's future and thanked her for working with me.     Writer used relational and interpersonal approach to explore feelings, motivations, and behavior. Writer offered support, feedback, validation, and reinforced use of skills taught in IRT from modalities including cognitive behavioral therapy, psycho education, and skills training. Promoted understanding of their experiences of psychosis and how it impacts them in important areas of their life and in recovery  goals. Reflected on client's strengths and resiliency factors and facilitated discussion on how these can assist in symptom management, recovery, and well-being.     Home practice identified as: Client agreed to continue to meditate with the aim of redirecting her focus to the present.     Sent email to Pt     Eduardo Sanchez,   Thank you again for allowing me to work with you!   We discussed self-care and overall you do a lot of self-care. You did note a few things you want to increase in your life. Those things were:  Exercise  Participate in fun activities (walking, swimming, dancing, sports)  Participate in hobbies  Go on vacations or day-trips  Talk about my problems with people in my life  Ask others for help when needed (we noted that this is hard to do)  Do enjoyable activities with other people  Keep in touch with old friends    I described a specific type of meditation to try to put some distance between you and your thoughts, even the worrisome thoughts. That was the 'Leaves on a Stream '' exercise that I attached here as a PDF.   We also were previously working on processing the episode and I had asked a number of prompting questions already. The notes I took for that module are below:  [Notes sent]    13. Treatment plan:   [facility] NAVIGATE Program IRT Treatment Plan Summary  A Part of the [facility] First Episode of Psychosis Program       Date of Initial IRT session: 8/29/2023  Date of INTIAL Treatment Plan: 9/14/2023  Last Review/Update Date:  7/9/24  Today's Date: 7/10/2024   Next 90-Day Review Due:  10/8/24    The following represents REVIEWED treatment plan.    Individual Resiliency Training Treatment Goals     Measurable Objectives Interventions Target Dates & Discharge Criteria   Individual s Objectives    Individual's Objectives       Identify psychosocial areas of need (current area of need is that she has been used to caring for others, and she needs to understand how to receive support from  "others).  Renew two old fun activities or develop new fun activities ceramics class?  Obtain/maintain employment      In Luara's own words:  I want a certain peace where I am not worried about it happening again.  IRT/Psychotherapy  -Psychoeducation  -Motivation interviewing  -CBT  -Behavioral activation    Coordination with other NAVIGATE Team Members:  - Supported Education and Employment  - Medication Management  - Family Education and Support  - Social Work Care Coordination Target date:   12-36 months from enrollment    Discharge criteria:  Marked and sustained symptom improvement     Laura demonstrates understanding of mental illness     Laura successfully implements strategies to cope with stressors and/or symptoms to mitigate risk for increase in symptom severity or relapse    Gains Made:  Identify top 5 strengths and use for goals.  Demonstrate understanding of symptoms regarding causes, treatment.  Gained a level of comfort in discussing her experiences.          Frequency of sessions and expected duration of treatment:   6-12 months of weekly IRT/Individual Psychotherapy followed by 12-24 months of biweekly or monthly IRT  1-4x per month Medication Management with Prescriber ongoing   Other services as client wishes to engage in them.      Participants in therapy plan:   Laura [facility]     Support System: Laura's , Laura's siblings, community through her Protestant. NAVIGATE team.       See signed Acknowledgement of Current Treatment Plan attached to this visit in patient chart (via \"consents\" tab).     Regulatory Guidelines for Updating Treatment Plan  [place] Medical Assistance: Reviewed & signed at least every 90 days  Medicare:  Update per policy      14. Plan/Referrals:     Today's visit was the last visit for this writer with this client. Writer previously informed client that my last day with be 7/26/24, discussed what we will spend our last visits going over. Client expressed understanding. " "Incoming new clinician will reach out to client/family to introduce self and discuss scheduling.     Billing for \"Interactive Complexity\"?    No    Park Rush, LORELEI    NAVIGATE Individual Resiliency Trainer      "

## 2024-08-16 ENCOUNTER — VIRTUAL VISIT (OUTPATIENT)
Dept: PSYCHIATRY | Facility: CLINIC | Age: 34
End: 2024-08-16
Payer: COMMERCIAL

## 2024-08-16 DIAGNOSIS — F29 PSYCHOSIS, UNSPECIFIED PSYCHOSIS TYPE (H): Primary | ICD-10-CM

## 2024-08-16 DIAGNOSIS — F43.10 PTSD (POST-TRAUMATIC STRESS DISORDER): ICD-10-CM

## 2024-08-16 NOTE — PROGRESS NOTES
"Virtual Visit Details    Originating Location (pt. Location): Home  {PROVIDER LOCATION On-site should be selected for visits conducted from your clinic location or adjoining [facility] hospital, academic office, or other nearby [facility] building. Off-site should be selected for all other provider locations, including home:[id number]}  Distant Location (provider location):  On-site  Platform used for Video Visit: Linkyt      NAVIGATE Medication Management Progress Note  A Part of the [facility] First Episode of Psychosis Program    NAVIGATE Enrollee: Laura Webb (1990)     MRN: [id number]  Date:  8/16/24         Contributors to the Assessment     Chart Reviewed.   Interview completed with Laura Webb.  Collateral information obtained from Laura.         Chief Complaint     \"***\"         Interim History    Laura Webb is a 33 year old female who was last seen 6/14/24 at which point we decreased Invega Sustenna from 156 to 117 mg q28 days. The patient reports good treatment adherence.  History was provided by Laura who was a fair historian.  Since the last visit:  -Noticed a change in   -Inappropriate lactation  -no peripheral vision changes or headache  -Movements being slowed-- noticed she is a bit slower over a few days after the injection  -    RECENT SOCIAL HISTORY:  SEE HPI    Medical ROS:  none         First Episode of Psychosis History      DUP (duration untreated psychosis):  several days to eight months  Route to initial care: hospitalization  Medication adherence overall:  See above, Clinician Rating Form in COMPASS Item 13  General frequency of visits:  monthly  Participation in groups:  No    Reviewed for completion of First Episode work-up:  Yes  First episode workup:  Not Done (if completed, see LABS for results)  MATRICS Consensus Cognitive Battery:  Not Done (if completed, see LABS for results)       Medical/Surgical History     Blood-group specific substance    There is no problem list on file " "for this patient.         Medications     Current Outpatient Medications   Medication Sig Dispense Refill    benztropine (COGENTIN) 1 MG tablet Take 1 tablet (1 mg) by mouth daily 30 tablet 4    paliperidone (INVEGA SUSTENNA) 117 MG/0.75ML TERRELL Inject 0.75 mLs (117 mg) into the muscle every 28 days 0.75 mL 2          Vitals     There were no vitals taken for this visit.          Mental Status Exam     Alertness: alert  and oriented  Appearance: well groomed  Behavior/Demeanor: pleasant and calm, with fair  eye contact, somewhat distant look at baseline  Speech: normal;   Language: no obvious problem  Psychomotor: normal or unremarkable  Mood: \"***\"  Affect: blunted; was congruent to mood; was congruent to content  Thought Process/Associations: unremarkable  Thought Content:  Reports none;  Denies suicidal and violent ideation and delusions  Perception:  Reports none;  Denies auditory hallucinations and visual hallucinations  Insight:  fair, improving  Judgment: good, adequate for safety  Cognition: does  appear grossly intact; formal cognitive testing was not done         Labs and Data     RATING SCALES: AIMS due    PHQ9 TODAY =       5/17/2024    10:31 AM 6/14/2024     8:11 AM 7/19/2024     9:30 AM   PHQ-9 SCORE   PHQ-9 Total Score 1 3 0       ANTIPSYCHOTIC LABS ROUTINE    [glu, A1C, lipids (focus LDL), liver enzymes, WBC, ANEU, Hgb, plts]   q12 mo  No lab results found.  No lab results found.  No lab results found.  No lab results found.    Narrative & Impression   EXAM: MR BRAIN W/O and W CONTRAST  LOCATION: [facility]  DATE: 8/22/2023     INDICATION:  Psychosis, unspecified psychosis type (H)  COMPARISON: None.  CONTRAST: 7ml Gadavist  TECHNIQUE: Routine multiplanar multisequence head MRI without and with intravenous contrast.     FINDINGS:  INTRACRANIAL CONTENTS: No acute or subacute infarct. No mass, acute hemorrhage, or extra-axial fluid collections. Normal brain parenchymal signal. " Normal ventricles and sulci. Normal position of the cerebellar tonsils. No pathologic contrast enhancement.   Posterior fossa structures including cerebellar hemispheres, fourth ventricle and CP angle cisterns are clear. Nasopharynx is clear. Region of the sella and suprasellar cistern are clear.     SELLA: No abnormality accounting for technique.     OSSEOUS STRUCTURES/SOFT TISSUES: Normal marrow signal. The major intracranial vascular flow voids are maintained.      ORBITS: No abnormality accounting for technique.      SINUSES/MASTOIDS: No paranasal sinus mucosal disease. No middle ear or mastoid effusion.                                                                    IMPRESSION:  1.  Normal head MRI.            Psychiatric Diagnoses     Schizophreniform disorder, with positive prognostic features  H/o catatonia  Likely PTSD         Assessment     From intake, 8/4/23: Laura Webb is a 32 year old  Black or  Not  or  female who presented for a comprehensive assessment of psychiatric symptoms by the First Episode of Psychosis Navigate Program. Diagnostically challenging given single-episode of mental illness. Differential is broad including psychosis due to medical condition (seizure), bipolar disorder, MDD single episode with psychotic features. Substances do not appear to be a contributing factor. Schizophrenia also considered, though unlikely given rapid-onset of her symptoms and high cognitive and psychosocial functioning following her first episode / hospitalization. Her chart review and interview today present as most consistent with schizophreniform disorder. Prognosis is fair-to-good. Rapid onset of symptoms is positive predictive factor for resolution of symptoms / return to baseline.   We discussed this assessment with her today. We discussed our recommendations: to continue current medications, complete her medical / neurological workup to rule-out underlying  epileptic and autoimmune etiologies. Recommend continuing long-acting-injectable for now, ideally for 1 year following resolution of her psychotic symptoms. She expressed understanding of this plan.      TODAY Laura reports slight improvement in feeling slowed the day of the injection after premedicating with benztropine last week; bounced back to normal the following day. Inconsistent history with catatonia but it is distressing to her. We had discussed trying a slightly lower dose of the injection given significant improvement and potential emergence of worsening EPSE with the slowing; she is still interested in this. Next dose will be 117 mg down from 156 mg; then will see her 11 days later and reassess.     PSYCHOTROPIC DRUG INTERACTIONS:   None.  MANAGEMENT:  N/A         Plan   1) Medications   - Decrease next Invega Sustenna dose from 156 mg to 117 mg IM q28 days (gets this at [place])  - continue benztropine 1 mg ***     2) Therapy-  weekly IRT      3) Next Due   Labs- remaining first episode labs ordered 08/10/23, still pending; now due for AP monitoring labs  EKG- last completed 4/20/23, repeat if increasing or adding Qtc- prolonging medications   Rating scales- AIMS: due      4) Referrals  Medical concerns- seizure history, neurology referral for EEG declined by patient       5) RTC: 1 month ***     6) Crisis Numbers:   Provided routinely in AVS     After hours:  [phone]    TREATMENT RISK STATEMENT:  The risks, benefits, alternatives and potential adverse effects have been discussed and are understood by the pt. The pt understands the risks of using street drugs or alcohol. There are no medical contraindications, the pt agrees to treatment with the ability to do so. The pt knows to call the clinic for any problems or to access emergency care if needed.  Medical and substance use concerns are documented above.  Psychotropic drug interaction check was done, including changes made today.    Yaneth  MD Arielle  Navigate Psychiatrist  , Department of Psychiatry and Behavioral Sciences  [facility]        The longitudinal plan of care for the diagnosis(es)/condition(s) as documented were addressed during this visit. Due to the added complexity in care, I will continue to support Laura in the subsequent management and with ongoing continuity of care.

## 2024-08-16 NOTE — PATIENT INSTRUCTIONS
Laura-It was nice to meet with you today. Here is what we discussed:    -Here is some information on Abilify from the [facility] website: [email or web address]    -We discussed that we could start by combining a low dose of Abilify with the Invega, and if that went OK, just switch the injections.    -no changes today, will follow up on 9/13.      Yaneth Guzmán MD  Navigate Psychiatrist  , Department of Psychiatry and Behavioral Sciences  [facility]

## 2024-09-13 ENCOUNTER — VIRTUAL VISIT (OUTPATIENT)
Dept: PSYCHIATRY | Facility: CLINIC | Age: 34
End: 2024-09-13
Payer: COMMERCIAL

## 2024-09-13 DIAGNOSIS — F29 PSYCHOSIS, UNSPECIFIED PSYCHOSIS TYPE (H): Primary | ICD-10-CM

## 2024-09-13 DIAGNOSIS — F43.10 PTSD (POST-TRAUMATIC STRESS DISORDER): ICD-10-CM

## 2024-09-13 NOTE — PROGRESS NOTES
"Virtual Visit Details    Originating Location (pt. Location): Home  {PROVIDER LOCATION On-site should be selected for visits conducted from your clinic location or adjoining [facility] hospital, academic office, or other nearby [facility] building. Off-site should be selected for all other provider locations, including home:[id number]}  Distant Location (provider location):  On-site  Platform used for Video Visit: ROXIMITY      NAVIGATE Medication Management Progress Note  A Part of the [facility] First Episode of Psychosis Program    NAVIGATE Enrollee: Laura Webb (1990)     MRN: [id number]  Date:  9/13/24         Contributors to the Assessment     Chart Reviewed.   Interview completed with Laura Webb.  Collateral information obtained from Laura.         Chief Complaint     \"***\"         Interim History    Laura Webb is a 33 year old female who was last seen 6/14/24 at which point we decreased Invega Sustenna from 156 to 117 mg q28 days. The patient reports good treatment adherence.  History was provided by Laura who was a fair historian.  Since the last visit:  Feeling     RECENT SOCIAL HISTORY:  SEE HPI    Medical ROS:  none         First Episode of Psychosis History      DUP (duration untreated psychosis):  several days to eight months  Route to initial care: hospitalization  Medication adherence overall:  See above, Clinician Rating Form in COMPASS Item 13  General frequency of visits:  monthly  Participation in groups:  No    Reviewed for completion of First Episode work-up:  Yes  First episode workup:  Not Done (if completed, see LABS for results)  MATRICS Consensus Cognitive Battery:  Not Done (if completed, see LABS for results)       Medical/Surgical History     Blood-group specific substance    There is no problem list on file for this patient.         Medications     Current Outpatient Medications   Medication Sig Dispense Refill    benztropine (COGENTIN) 1 MG tablet Take 1 tablet (1 mg) by mouth daily 30 " "tablet 4    paliperidone (INVEGA SUSTENNA) 117 MG/0.75ML TERRELL Inject 0.75 mLs (117 mg) into the muscle every 28 days 0.75 mL 2          Vitals     There were no vitals taken for this visit.          Mental Status Exam     Alertness: alert  and oriented  Appearance: well groomed  Behavior/Demeanor: pleasant and calm, with fair  eye contact, somewhat distant look at baseline  Speech: normal;   Language: no obvious problem  Psychomotor: normal or unremarkable  Mood: \"***\"  Affect: blunted; was congruent to mood; was congruent to content  Thought Process/Associations: unremarkable  Thought Content:  Reports none;  Denies suicidal and violent ideation and delusions  Perception:  Reports none;  Denies auditory hallucinations and visual hallucinations  Insight:  fair, improving  Judgment: good, adequate for safety  Cognition: does  appear grossly intact; formal cognitive testing was not done         Labs and Data     RATING SCALES: AIMS due    PHQ9 TODAY =       5/17/2024    10:31 AM 6/14/2024     8:11 AM 7/19/2024     9:30 AM   PHQ-9 SCORE   PHQ-9 Total Score 1 3 0       ANTIPSYCHOTIC LABS ROUTINE    [glu, A1C, lipids (focus LDL), liver enzymes, WBC, ANEU, Hgb, plts]   q12 mo  No lab results found.  No lab results found.  No lab results found.  No lab results found.    Narrative & Impression   EXAM: MR BRAIN W/O and W CONTRAST  LOCATION: [facility]  DATE: 8/22/2023     INDICATION:  Psychosis, unspecified psychosis type (H)  COMPARISON: None.  CONTRAST: 7ml Gadavist  TECHNIQUE: Routine multiplanar multisequence head MRI without and with intravenous contrast.     FINDINGS:  INTRACRANIAL CONTENTS: No acute or subacute infarct. No mass, acute hemorrhage, or extra-axial fluid collections. Normal brain parenchymal signal. Normal ventricles and sulci. Normal position of the cerebellar tonsils. No pathologic contrast enhancement.   Posterior fossa structures including cerebellar hemispheres, fourth " ventricle and CP angle cisterns are clear. Nasopharynx is clear. Region of the sella and suprasellar cistern are clear.     SELLA: No abnormality accounting for technique.     OSSEOUS STRUCTURES/SOFT TISSUES: Normal marrow signal. The major intracranial vascular flow voids are maintained.      ORBITS: No abnormality accounting for technique.      SINUSES/MASTOIDS: No paranasal sinus mucosal disease. No middle ear or mastoid effusion.                                                                    IMPRESSION:  1.  Normal head MRI.            Psychiatric Diagnoses     Schizophreniform disorder, with positive prognostic features  H/o catatonia  Likely PTSD         Assessment     From intake, 8/4/23: Laura Webb is a 32 year old  Black or  Not  or  female who presented for a comprehensive assessment of psychiatric symptoms by the First Episode of Psychosis Navigate Program. Diagnostically challenging given single-episode of mental illness. Differential is broad including psychosis due to medical condition (seizure), bipolar disorder, MDD single episode with psychotic features. Substances do not appear to be a contributing factor. Schizophrenia also considered, though unlikely given rapid-onset of her symptoms and high cognitive and psychosocial functioning following her first episode / hospitalization. Her chart review and interview today present as most consistent with schizophreniform disorder. Prognosis is fair-to-good. Rapid onset of symptoms is positive predictive factor for resolution of symptoms / return to baseline.   We discussed this assessment with her today. We discussed our recommendations: to continue current medications, complete her medical / neurological workup to rule-out underlying epileptic and autoimmune etiologies. Recommend continuing long-acting-injectable for now, ideally for 1 year following resolution of her psychotic symptoms. She expressed  understanding of this plan.      TODAY Laura reports ***     PSYCHOTROPIC DRUG INTERACTIONS:   None.  MANAGEMENT:  N/A         Plan   1) Medications   - Continue Invega Sustenna 117 mg IM q28 days (gets this at [facility])     2) Therapy-  resume weekly IRT      3) Next Due   Labs- remaining first episode labs ordered 08/10/23, still pending; now due for AP monitoring labs  EKG- last completed 4/20/23, repeat if increasing or adding Qtc- prolonging medications   Rating scales- AIMS: due      4) Referrals  Medical concerns- seizure history, neurology referral for EEG declined by patient       5) RTC: 1 month ***     6) Crisis Numbers:   Provided routinely in AVS     After hours:  [phone]    TREATMENT RISK STATEMENT:  The risks, benefits, alternatives and potential adverse effects have been discussed and are understood by the pt. The pt understands the risks of using street drugs or alcohol. There are no medical contraindications, the pt agrees to treatment with the ability to do so. The pt knows to call the clinic for any problems or to access emergency care if needed.  Medical and substance use concerns are documented above.  Psychotropic drug interaction check was done, including changes made today.    Yaneth Guzámn MD  Navigate Psychiatrist  , Department of Psychiatry and Behavioral Sciences  [employer]        The longitudinal plan of care for the diagnosis(es)/condition(s) as documented were addressed during this visit. Due to the added complexity in care, I will continue to support Laura in the subsequent management and with ongoing continuity of care.   documented were addressed during this visit. Due to the added complexity in care, I will continue to support Laura in the subsequent management and with ongoing continuity of care.

## 2024-10-01 DIAGNOSIS — F29 PSYCHOSIS, UNSPECIFIED PSYCHOSIS TYPE (H): ICD-10-CM

## 2024-10-01 RX ORDER — PALIPERIDONE PALMITATE 117 MG/.75ML
117 INJECTION INTRAMUSCULAR
Qty: 0.75 ML | Refills: 2 | Status: SHIPPED | OUTPATIENT
Start: 2024-10-01

## 2024-10-01 NOTE — TELEPHONE ENCOUNTER
Caleb from [facility] needs new orders the current one has  and Laura has an appt tomorrow. Please fax new orders to # [phone] ATT: Dayana

## 2024-10-25 ENCOUNTER — VIRTUAL VISIT (OUTPATIENT)
Dept: PSYCHIATRY | Facility: CLINIC | Age: 34
End: 2024-10-25
Payer: COMMERCIAL

## 2024-10-25 DIAGNOSIS — F29 PSYCHOSIS, UNSPECIFIED PSYCHOSIS TYPE (H): Primary | ICD-10-CM

## 2024-10-25 ASSESSMENT — ANXIETY QUESTIONNAIRES
3. WORRYING TOO MUCH ABOUT DIFFERENT THINGS: NOT AT ALL
6. BECOMING EASILY ANNOYED OR IRRITABLE: NOT AT ALL
7. FEELING AFRAID AS IF SOMETHING AWFUL MIGHT HAPPEN: NOT AT ALL
1. FEELING NERVOUS, ANXIOUS, OR ON EDGE: NOT AT ALL
GAD7 TOTAL SCORE: 0
GAD7 TOTAL SCORE: 0
2. NOT BEING ABLE TO STOP OR CONTROL WORRYING: NOT AT ALL
5. BEING SO RESTLESS THAT IT IS HARD TO SIT STILL: NOT AT ALL

## 2024-10-25 ASSESSMENT — PATIENT HEALTH QUESTIONNAIRE - PHQ9: 5. POOR APPETITE OR OVEREATING: NOT AT ALL

## 2024-10-25 NOTE — Clinical Note
Off of benzotropine for 1 day. Pharmacy out of stock. Can someone contact her Monday morning to make sure she's back on it. Otherwise, team has to call it in to a different pharmacy.

## 2024-10-25 NOTE — PROGRESS NOTES
Laura is a 34 year old who is being evaluated via a billable video visit.    How would you like to obtain your AVS? Charito  If the video visit is dropped, the invitation should be resent by: Text to cell phone: [phone]  Will anyone else be joining your video visit? No         NAVIGATE Patient Self-Rating Form    Since your last medication management visit--    Have you been feeling depressed, sad, or down? No  Have you been feeling anxious, worried or nervous? No  Have you been thinking about death or have you had any feelings that you would be better off dead? No   Have you been feeling particularly good? Yes  Have you been feeling annoyed, angry, or resentful (whether you showed it or not)? No  Did you do anything that could have gotten you in trouble? No  Have you felt dizzy or faint? No  Have you had blurred vision? No  Have you had dry mouth? No  Have you had too much saliva in your mouth or had drooling? No  Have you felt nauseous? No  Have you been constipated? No  Has you appetite for food been increased? No  Have you gained weight? No  Have you lost weight? No  Have you felt restless or like you cannot sit still? No  Any shaking of your hands, legs, or other muscles? No  Any problems walking or moving or any problems feeling stiff or rigid? No  Have you felt tired or fatigued? No  Have you felt drowsy during the day? No  Have you been sleeping too much at night? No  Have you been sleeping too little or had problems sleeping at night? No  Any decrease in your interest in sex? No  Any other problems with sex? No  Any problems with your breasts such as swelling or discharge? No  For women, any problems with your period? No  Are there other medical or side effect problems you wish to discuss with your prescriber? Yes  Since your last visit, how many days have you not taken your medication? 1  Have you had trouble remembering to take your medication? No  Do you find the number of medicines or the times  "when you are supposed to take then confusing or burdensome? No  Are you afraid of the medication? No  Do you think that you have an illness that requires taking medication? Yes  Do you think that other people would think poorly of you if they knew that you take medication? No  On average, how many cigarettes do you smoke per day? 0  Since you last visit, did you drink alcohol? No  Since your last visit, have you used any marijuana? No  Since your last visit, have you used any stress drugs other than marijuana? No  Between now and your next visit, do you think we should keep your medication the same or consider changing the medications? :\"keep it the same.\"   Vitals:  No vitals were obtained today due to virtual visit.    Video-Visit Details    Type of service:  Video Visit   Video End Time:9:51 AM  Originating Location (pt. Location): Home    Distant Location (provider location):  On-site  Platform used for Video Visit: Paras  Signed Electronically by: Norm Snyder MD           "

## 2024-10-25 NOTE — PROGRESS NOTES
"Virtual Visit Details    Originating Location (pt. Location): Home    Distant Location (provider location):  On-site  Platform used for Video Visit: [place]      MILAN Medication Management Progress Note  A Part of the [facility] First Episode of Psychosis Program    NAVIGATE Enrollee: Laura Webb (1990)     MRN: [id number]  Date:  10/25/24         Contributors to the Assessment     Chart Reviewed.   Interview completed with Laura Webb.  Collateral information obtained from Laura.         Chief Complaint     \"Doing OK\"         Interim History    Laura Webb is a 34 year old female who was last seen 9/13/24 at which point no med changes were made. The patient reports good treatment adherence.  History was provided by Laura who was a fair historian.   last visit:  Laura reported she reviewed the information about Abilify but is not interested in adding to her regimen or working toward switching at this time. Dr Guzmán had concerns about the implications of hyperprolactinemia going forward but also do not recommend a rapid taper of Invega Sustenna, given the severity of her symptoms last year. However, given she has now been in remission for over one year,  could decreasing the Invega dose in the future. Ultimately,  switching over tapering to reduce risk of relapse.    Today:  She reports pharmacy is out of stock for benzotropine. Ran out 1 day ago. No major side effects yet. Doesn't know when she will receive it.   Planning to spend day with son and .   Denies distressing symptoms.   Son in her arms, appeared relaxed and content. Laura came across as attentive to his needs.     RECENT SOCIAL HISTORY:  SEE HPI    Medical ROS:  none         First Episode of Psychosis History      DUP (duration untreated psychosis):  several days to eight months  Route to initial care: hospitalization  Medication adherence overall:  See above, Clinician Rating Form in COMPASS Item 13  General frequency of visits: " " monthly  Participation in groups:  No    Reviewed for completion of First Episode work-up:  Yes  First episode workup:  Not Done (if completed, see LABS for results)  MATRICS Consensus Cognitive Battery:  Not Done (if completed, see LABS for results)       Medical/Surgical History     Blood-group specific substance    There is no problem list on file for this patient.         Medications     Current Outpatient Medications   Medication Sig Dispense Refill    benztropine (COGENTIN) 1 MG tablet Take 1 tablet (1 mg) by mouth daily 30 tablet 4    paliperidone (INVEGA SUSTENNA) 117 MG/0.75ML injection Inject 0.75 mLs (117 mg) into the muscle every 28 days. 0.75 mL 2          Vitals     There were no vitals taken for this visit.          Mental Status Exam     Alertness: alert  and oriented  Appearance: well groomed  Behavior/Demeanor: pleasant and calm, with fair  eye contact, somewhat distant look at baseline  Speech: normal;   Language: no obvious problem  Psychomotor: normal or unremarkable  Mood: \"good\"  Affect: blunted; was congruent to mood; was congruent to content  Thought Process/Associations: unremarkable  Thought Content:  Reports none;  Denies suicidal and violent ideation and delusions  Perception:  Reports none;  Denies auditory hallucinations and visual hallucinations  Insight:  fair, improving  Judgment: good, adequate for safety  Cognition: does  appear grossly intact; formal cognitive testing was not done         Labs and Data     RATING SCALES: AIMS due    PHQ9 TODAY =       5/17/2024    10:31 AM 6/14/2024     8:11 AM 7/19/2024     9:30 AM   PHQ-9 SCORE   PHQ-9 Total Score 1 3 0       ANTIPSYCHOTIC LABS ROUTINE    [glu, A1C, lipids (focus LDL), liver enzymes, WBC, ANEU, Hgb, plts]   q12 mo  No lab results found.  No lab results found.  No lab results found.  No lab results found.    Narrative & Impression   EXAM: MR BRAIN W/O and W CONTRAST  LOCATION: [facility]  DATE: " 8/22/2023     INDICATION:  Psychosis, unspecified psychosis type (H)  COMPARISON: None.  CONTRAST: 7ml Gadavist  TECHNIQUE: Routine multiplanar multisequence head MRI without and with intravenous contrast.     FINDINGS:  INTRACRANIAL CONTENTS: No acute or subacute infarct. No mass, acute hemorrhage, or extra-axial fluid collections. Normal brain parenchymal signal. Normal ventricles and sulci. Normal position of the cerebellar tonsils. No pathologic contrast enhancement.   Posterior fossa structures including cerebellar hemispheres, fourth ventricle and CP angle cisterns are clear. Nasopharynx is clear. Region of the sella and suprasellar cistern are clear.     SELLA: No abnormality accounting for technique.     OSSEOUS STRUCTURES/SOFT TISSUES: Normal marrow signal. The major intracranial vascular flow voids are maintained.      ORBITS: No abnormality accounting for technique.      SINUSES/MASTOIDS: No paranasal sinus mucosal disease. No middle ear or mastoid effusion.                                                                    IMPRESSION:  1.  Normal head MRI.            Psychiatric Diagnoses     Schizophreniform disorder, with positive prognostic features  H/o catatonia  Likely PTSD         Assessment     From intake, 8/4/23: Laura Webb is a 32 year old  Black or  Not  or  female who presented for a comprehensive assessment of psychiatric symptoms by the First Episode of Psychosis Navigate Program. Diagnostically challenging given single-episode of mental illness. Differential is broad including psychosis due to medical condition (seizure), bipolar disorder, MDD single episode with psychotic features. Substances do not appear to be a contributing factor. Schizophrenia also considered, though unlikely given rapid-onset of her symptoms and high cognitive and psychosocial functioning following her first episode / hospitalization. Her chart review and interview today  present as most consistent with schizophreniform disorder. Prognosis is fair-to-good. Rapid onset of symptoms is positive predictive factor for resolution of symptoms / return to baseline.   We discussed this assessment with her today. We discussed our recommendations: to continue current medications, complete her medical / neurological workup to rule-out underlying epileptic and autoimmune etiologies. Recommend continuing long-acting-injectable for now, ideally for 1 year following resolution of her psychotic symptoms. She expressed understanding of this plan.      TODAY  Ran out of benzotropine and pharmacy is out of stock.     PSYCHOTROPIC DRUG INTERACTIONS:   None.  MANAGEMENT:  N/A         Plan   1) Medications   - Continue Invega Sustenna 117 mg IM q28 days (gets this at [facility])  - Make sure to contact patient on Monday morning and inquire about benzotropine and whether she was able to obtain it. Otherwise, team will order it to a different pharmacy.      2) Therapy-  resume weekly IRT      3) Next Due   Labs- remaining first episode labs ordered 08/10/23, still pending; now due for AP monitoring labs  EKG- last completed 4/20/23, repeat if increasing or adding Qtc- prolonging medications   Rating scales- AIMS: due      4) Referrals  Medical concerns- seizure history, neurology referral for EEG declined by patient       5) RTC: Typically 1 month but will schedule in 2 weeks so that she can continue discussing tapering off meds with Dr Guzmán.      6) Crisis Numbers:   Provided routinely in AVS     After hours:  [phone]    TREATMENT RISK STATEMENT:  The risks, benefits, alternatives and potential adverse effects have been discussed and are understood by the pt. The pt understands the risks of using street drugs or alcohol. There are no medical contraindications, the pt agrees to treatment with the ability to do so. The pt knows to call the clinic for any problems or to access emergency care if needed.   Medical and substance use concerns are documented above.  Psychotropic drug interaction check was done, including changes made today.    Virginia Snyder MD  Professor, Department of Psychiatry and Behavioral Sciences  [employer]      The longitudinal plan of care for the diagnosis(es)/condition(s) as documented were addressed during this visit. Due to the added complexity in care, I will continue to support Laura in the subsequent management and with ongoing continuity of care.

## 2024-10-29 ENCOUNTER — TELEPHONE (OUTPATIENT)
Dept: PSYCHIATRY | Facility: CLINIC | Age: 34
End: 2024-10-29

## 2024-10-29 NOTE — TELEPHONE ENCOUNTER
Norm Snyder MD Swanson, Melanie A, RN  Off of benzotropine for 1 day. Pharmacy out of stock. Can someone contact her Monday morning to make sure she's back on it. Otherwise, team has to call it in to a different pharmacy.      -----------------------------------------------------------------------------------------------------------------------------    Writer called patient.  She states the pharmacy was able to get the medication and she does not need anything further at this time.  Encouraged her to reach out if there are any issues.

## 2024-11-08 ENCOUNTER — VIRTUAL VISIT (OUTPATIENT)
Dept: PSYCHIATRY | Facility: CLINIC | Age: 34
End: 2024-11-08
Payer: COMMERCIAL

## 2024-11-08 DIAGNOSIS — F43.10 PTSD (POST-TRAUMATIC STRESS DISORDER): ICD-10-CM

## 2024-11-08 DIAGNOSIS — F29 PSYCHOSIS, UNSPECIFIED PSYCHOSIS TYPE (H): Primary | ICD-10-CM

## 2024-11-08 NOTE — PROGRESS NOTES
-Spend sometime redoing module 2.    -Getting to know her, what are her goals. What does she want to work on.    -Can redo strengths test.   -Then can focus on dealing with negative feelings.     [place]:   -Introductions:  -Introduction to Navigate and IRT:      What does a typical day look like for you?  Drop daughter off at school.   Has supports with son with grandparents.   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   Looking for a job now too.   Has 2 kids-5 and 3.   Has a . Things good with .   Grandparents living at home too.     What brings you maribell?   Not really.   Would spend free time watching a show.     Who are some supports in your life?   Has siblings. Doing there own thing. They also live in [place].     Can you tell me a little bit about your mental health history?  /What brought Laura to the Navigate Program:  -Started NAVIGATE a year ago to help prevent an episode from happening again.   -Isolated episode  -No episodes since.   -Episode about a year ago.     Before Episode:   Mental Health was fine.   It just happened.   No anxiety or depression.   Denied any prodromal symptoms, all rather rapid.     Since the episode happened, it hasn't affected much.     Current Psych ROS:     -Psychosis: No AVH, paranoia  -Depression: Denies.   -Si/HI: No  -Anxiety: Anxiety about another episode happening has redued.    -No other general anxiety.     -Substance Use: No. No.     What have you been working on with Auralie?   -They were working on processing what happened.   These were the main things:      -Radical acceptance   -Meditation    Start Module 2: Getting to know you: Wellness.   -Wellness: Means coping through life.    -Wellness: independence.   -Nothing else.     Resilient Qualities: Positivity, flexibility    Initially, I attempted to ask Laura what her goals are and what she has been working on with Auralie. She said processing the episde and when I asked about what  specifically. She wanted me to refer to the previous notes, when I did ask about radical acceptance (as kareemcher had mentioned that in the past episode), and how she has used that , Laura did not provide specific examples. Laura said that she is in a place of where she wants to be.     I attempted to discuss resilient qualities and strengths further but Laura seemed less engaged, not interested. When I asked about this and  what she would like to work on, Laura said that she is in a good place, wants to move past the episode, has processed a lot. She said that Park would go through modules, given examples and it was both of them talking and not just Laura talking.      "seemed less engaged, not interested. When I asked about this and  what she would like to work on, Laura said that she is in a good place, wants to move past the episode, has processed a lot. We talked about what worked well with Park and she noted that Conchitacher would go through modules, given examples and it was both of them talking and not just Laura talking.     13. Plan/Referrals:     Follow Up 1 Week    Billing for \"Interactive Complexity\"?    No      Monique Jeffrey MD    PGY-3 Psychiatry  NAVIGATE Individual Resiliency Trainer       "

## 2024-11-20 ENCOUNTER — VIRTUAL VISIT (OUTPATIENT)
Dept: PSYCHIATRY | Facility: CLINIC | Age: 34
End: 2024-11-20
Payer: COMMERCIAL

## 2024-11-20 DIAGNOSIS — F29 PSYCHOSIS, UNSPECIFIED PSYCHOSIS TYPE (H): Primary | ICD-10-CM

## 2024-11-20 DIAGNOSIS — F43.10 PTSD (POST-TRAUMATIC STRESS DISORDER): ICD-10-CM

## 2024-11-20 NOTE — NURSING NOTE
Is the patient currently in the state of [place]? YES    Visit mode:VIDEO    If the visit is dropped, the patient can be reconnected by: VIDEO VISIT: Send to e-mail at: [email or web address]    Will anyone else be joining the visit? YES: How would they like to receive their invitation? Other e-mail: son      How would you like to obtain your AVS? MyChart    Are changes needed to the allergy or medication list? NO    Reason for visit: Follow Up      NAVIGATE Patient Self-Rating Form    Since your last medication management visit--    Have you been feeling depressed, sad, or down? No  Have you been feeling anxious, worried or nervous? No  Have you been thinking about death or have you had any feelings that you would be better off dead? No   Have you been feeling particularly good? No  Have you been feeling annoyed, angry, or resentful (whether you showed it or not)? No  Did you do anything that could have gotten you in trouble? No  Have you felt dizzy or faint? No  Have you had blurred vision? No  Have you had dry mouth? No  Have you had too much saliva in your mouth or had drooling? No  Have you felt nauseous? No  Have you been constipated? No  Has you appetite for food been increased? No  Have you gained weight? No  Have you lost weight? No  Have you felt restless or like you cannot sit still? No  Any shaking of your hands, legs, or other muscles? No  Any problems walking or moving or any problems feeling stiff or rigid? No  Have you felt tired or fatigued? No  Have you felt drowsy during the day? No  Have you been sleeping too much at night? No  Have you been sleeping too little or had problems sleeping at night? No  Any decrease in your interest in sex? No  Any other problems with sex? No  Any problems with your breasts such as swelling or discharge? No  For women, any problems with your period? Yes  Are there other medical or side effect problems you wish to discuss with your prescriber? No  Since your last visit,  how many days have you not taken your medication? 0  Have you had trouble remembering to take your medication? No  Do you find the number of medicines or the times when you are supposed to take then confusing or burdensome? No  Are you afraid of the medication? No  Do you think that you have an illness that requires taking medication? Yes  Do you think that other people would think poorly of you if they knew that you take medication? No  On average, how many cigarettes do you smoke per day? 0  Since you last visit, did you drink alcohol? No  Since your last visit, have you used any marijuana? No  Since your last visit, have you used any stress drugs other than marijuana? No  Between now and your next visit, do you think we should keep your medication the same or consider changing the medications? Same  Laura Alarcon, [facility]

## 2024-11-20 NOTE — PROGRESS NOTES
"Virtual Visit Details    Originating Location (pt. Location): Home    Distant Location (provider location):  On-site  Platform used for Video Visit: [employer]      NAVIGChicory Medication Management Progress Note  A Part of the [facility] First Episode of Psychosis Program    NAVIGATE Enrollee: Laura Webb (1990)     MRN: [id number]  Date:  11/20/24         Contributors to the Assessment     Chart Reviewed.   Interview completed with Laura Webb.  Collateral information obtained from Laura.         Chief Complaint     \"Doing OK\"         Interim History    Laura Webb is a 34 year old female who was last seen 10/25/24 with Dr. Snyder at which point no med changes were made. The patient reports good treatment adherence.  History was provided by Laura who was a good historian.  Since the last visit:   She had an injection a couple of weeks ago and reported that it went okay. She did not experience any slowing afterwards and did not take benztropine before the injection. Laura reported no recent panic attacks or feelings of anxiety. She also reported no new physical health problems.    Laura has started seeing a new therapist, Monique, which she described as \"kind of rough\" because it feels like starting over. She expressed frustration about having to revisit painful topics and feeling like she is going back to square one. She also felt that her new therapist did not lead the discussion enough, noting that Park, the previous IRT, would often offer suggestions for things that had worked for other patients and then suggest Laura could try them. She was open to discussing these concerns with Monique.    When offered, she stated she would prefer to try a lower dose of her current medication instead of switching. She mentioned that she has been on the medication for a while and her body has adjusted to it. She reported experiencing tremors and other side effects in the past, which the benztropine has helped to " "reduce, but she has been on the current medication for a while and it has not significantly affected her body. She expressed a desire to continue thinking about the possibility of switching medications.    RECENT SOCIAL HISTORY:  SEE HPI    Medical ROS:  none         First Episode of Psychosis History      DUP (duration untreated psychosis):  several days to eight months  Route to initial care: hospitalization  Medication adherence overall:  See above, Clinician Rating Form in COMPASS Item 13  General frequency of visits:  monthly  Participation in groups:  No    Reviewed for completion of First Episode work-up:  Yes  First episode workup:  Not Done (if completed, see LABS for results)  MATRICS Consensus Cognitive Battery:  Not Done (if completed, see LABS for results)       Medical/Surgical History     Blood-group specific substance    There is no problem list on file for this patient.         Medications     Current Outpatient Medications   Medication Sig Dispense Refill    benztropine (COGENTIN) 1 MG tablet Take 1 tablet (1 mg) by mouth daily 30 tablet 4    paliperidone (INVEGA SUSTENNA) 117 MG/0.75ML injection Inject 0.75 mLs (117 mg) into the muscle every 28 days. 0.75 mL 2          Vitals     There were no vitals taken for this visit.          Mental Status Exam     Alertness: alert  and oriented  Appearance: well groomed  Behavior/Demeanor: pleasant and calm, with fair  eye contact, somewhat distant look at baseline  Speech: normal;   Language: no obvious problem  Psychomotor: normal or unremarkable  Mood: \"good\"  Affect: blunted; was congruent to mood; was congruent to content  Thought Process/Associations: unremarkable  Thought Content:  Reports none;  Denies suicidal and violent ideation and delusions  Perception:  Reports none;  Denies auditory hallucinations and visual hallucinations  Insight:  fair  Judgment: good, adequate for safety  Cognition: does  appear grossly intact; formal cognitive testing was " not done         Labs and Data     RATING SCALES: AIMS due    PHQ9 TODAY =       5/17/2024    10:31 AM 6/14/2024     8:11 AM 7/19/2024     9:30 AM   PHQ-9 SCORE   PHQ-9 Total Score 1 3 0       ANTIPSYCHOTIC LABS ROUTINE    [glu, A1C, lipids (focus LDL), liver enzymes, WBC, ANEU, Hgb, plts]   q12 mo  No lab results found.  No lab results found.  No lab results found.  No lab results found.    Narrative & Impression   EXAM: MR BRAIN W/O and W CONTRAST  LOCATION: [facility]  DATE: 8/22/2023     INDICATION:  Psychosis, unspecified psychosis type (H)  COMPARISON: None.  CONTRAST: 7ml Gadavist  TECHNIQUE: Routine multiplanar multisequence head MRI without and with intravenous contrast.     FINDINGS:  INTRACRANIAL CONTENTS: No acute or subacute infarct. No mass, acute hemorrhage, or extra-axial fluid collections. Normal brain parenchymal signal. Normal ventricles and sulci. Normal position of the cerebellar tonsils. No pathologic contrast enhancement.   Posterior fossa structures including cerebellar hemispheres, fourth ventricle and CP angle cisterns are clear. Nasopharynx is clear. Region of the sella and suprasellar cistern are clear.     SELLA: No abnormality accounting for technique.     OSSEOUS STRUCTURES/SOFT TISSUES: Normal marrow signal. The major intracranial vascular flow voids are maintained.      ORBITS: No abnormality accounting for technique.      SINUSES/MASTOIDS: No paranasal sinus mucosal disease. No middle ear or mastoid effusion.                                                                    IMPRESSION:  1.  Normal head MRI.            Psychiatric Diagnoses     Schizophreniform disorder, with positive prognostic features  H/o catatonia  Likely PTSD         Assessment     From intake, 8/4/23: Laura Webb is a 32 year old  Black or  Not  or  female who presented for a comprehensive assessment of psychiatric symptoms by the First  Episode of Psychosis Navigate Program. Diagnostically challenging given single-episode of mental illness. Differential is broad including psychosis due to medical condition (seizure), bipolar disorder, MDD single episode with psychotic features. Substances do not appear to be a contributing factor. Schizophrenia also considered, though unlikely given rapid-onset of her symptoms and high cognitive and psychosocial functioning following her first episode / hospitalization. Her chart review and interview today present as most consistent with schizophreniform disorder. Prognosis is fair-to-good. Rapid onset of symptoms is positive predictive factor for resolution of symptoms / return to baseline.   We discussed this assessment with her today. We discussed our recommendations: to continue current medications, complete her medical / neurological workup to rule-out underlying epileptic and autoimmune etiologies. Recommend continuing long-acting-injectable for now, ideally for 1 year following resolution of her psychotic symptoms. She expressed understanding of this plan.      TODAY  Laura remains stable and has not experienced any recent panic attacks or feelings of anxiety. She has also not reported any new somatic health issues, but continues to have amenorrhea from high prolactin (likely due to Invega). However, she has been experiencing some challenges with her new IRT especially the need to revisit distressing topics again.    Laura has expressed a preference to try a reduced dosage of her medication. I expressed concern that this may not ameliorate the condition with the hyperprolactinemia, and could expose her to additional symptoms, but I think it is a reasonable choice. We have discussed a switch to Abilify as a potential solution, but Laura is reluctant due to her physiological adaptation to Invega and apprehension regarding potential adverse effects with a new pharmacological agent.    SUICIDE RISK ASSESSMENT-   Risk factors for self-harm:  none .  Mitigating factors: no h/o suicide attempt, no plan or intent, describes a safety plan, h/o seeking help , future oriented, commitment to family, good social support  , stable housing, stable finances, and Rastafari beliefs.  The patient does not appear to be at imminent risk for self-harm, hospitalization is not recommended which the pt does  agree with. No hospitalization will be arranged. Based on degree of symptoms close psych follow-up was/were recommended which the pt does  agree to.     PSYCHOTROPIC DRUG INTERACTIONS:   None.  MANAGEMENT:  N/A         Plan   1) Medications   - Decrease Invega Sustenna to 79 mg IM q28 days (gets this at [place])  - continue benztropine 1 mg PO daily      2) Therapy-  resume weekly IRT      3) Next Due   Labs- remaining first episode labs ordered 08/10/23, still pending; now due for AP monitoring labs, ordered and pending  EKG- last completed 4/20/23, repeat if increasing or adding Qtc- prolonging medications   Rating scales- AIMS: due      4) Referrals  Medical concerns- seizure history, neurology referral for EEG declined by patient       5) RTC: 1 month.      6) Crisis Numbers:   Provided routinely in AVS     After hours:  [phone]    TREATMENT RISK STATEMENT:  The risks, benefits, alternatives and potential adverse effects have been discussed and are understood by the pt. The pt understands the risks of using street drugs or alcohol. There are no medical contraindications, the pt agrees to treatment with the ability to do so. The pt knows to call the clinic for any problems or to access emergency care if needed.  Medical and substance use concerns are documented above.  Psychotropic drug interaction check was done, including changes made today.    Yaneth Guzmán MD  Navigate Psychiatrist  , Department of Psychiatry and Behavioral Sciences  [employer] [facility]    The longitudinal plan of care for  the diagnosis(es)/condition(s) as documented were addressed during this visit. Due to the added complexity in care, I will continue to support Laura in the subsequent management and with ongoing continuity of care.

## 2024-11-22 ENCOUNTER — VIRTUAL VISIT (OUTPATIENT)
Dept: PSYCHIATRY | Facility: CLINIC | Age: 34
End: 2024-11-22
Payer: COMMERCIAL

## 2024-11-22 DIAGNOSIS — F29 PSYCHOSIS, UNSPECIFIED PSYCHOSIS TYPE (H): Primary | ICD-10-CM

## 2024-11-22 NOTE — PROGRESS NOTES
NAVIGJESUS Clinician Contact & Progress Note  For Individual Resiliency Training (IRT)  A Part of the [facility] First Episode of Psychosis Program    NAVIGATE Enrollee: Laura Webb (1990)     MRN: [id number]  Date:  11/22/24  Diagnosis:   Psychosis, unspecified,  PTSD  Clinician: MILAN Individual Resiliency Trainer, Monique Jeffrey MD     1. Type of contact: (majority of time spent)  IRT Session via telehealth  Mode of communication: [employer] (HIPAA compliant, secure platform). Patient consented verbally to this mode of therapy today.  Reason for telehealth: To reduce barriers in accessing services, due to but not limited to transportation, location, or scheduling reasons.    2. People present:   Writer  Client: Yes - Presented alone    3. Length of Actual Contact: Start Time: 9:30; End Time: 10:00     4. Location of contact:  Originating Location (patient location): Located in [place]  Distant Location (provider location): [place], [place]    5. Did the client complete the home practice option(s) from the previous session: Not Applicable    6. Motivational Teaching Strategies:  Connect info and skills with personal goals    7. Educational Teaching Strategies:  Review of written material/education  Relate information to client's experience  Ask questions to check comprehension    8. CBT Teaching Strategies:  N/A    9. IRT Module(s) Addressed:  Satisfaction in areas of life     10. Techniques utilized:   [place] announced at beginning of session  Review of goal  Present new material    11. Measures:    Mental Status Exam  Alertness: alert  and oriented  Behavior/Demeanor: guarded , especially at the beginning but less so as session progressed, always listening and does respond  Speech: increased latency of response and regular rate and rhythm  Language: no obvious problem.   Mood: ok  Affect: Restricted, brightened on occasion today.   Thought Process/Associations: linear, goal directed  Thought  "Content:  Reports none;  Denies SI  Insight: unable to assess from limited encounter  Judgment: good  Cognition: does  appear grossly intact; formal cognitive testing was not done    12. Assessment/Progress Note:     Interventions:   Reviewed goals from previous therapist to see where Laura is at now  Review Self Care Strategies.   Satisfactions in Areas of Life Module     1.  We reviewed goals that Laura had discussed with [facility] therapist, Park to see where Laura is at now. Laura discussed some goals of wanting to socialize more (ex. She wanted to see a movie and having someone with that interest would be nice). She also expressed wanting to find more interests for herself.     2. We did review self care strategies: exercise, sleep, balanced meals. Laura shared that she has been doing a lot of this already. Does hope to to bike more.     3. Reviewed satisfaction in areas of life module. Goals/interests consistent with above in terms of wanting to find more interests for self and also establish more friendships/relationships.  Laura did say that overall she does feel pretty well supported with family.     13. Plan/Referrals:     Follow Up 2 Weeks ( as I am off the next week)     Billing for \"Interactive Complexity\"?    No    Monique Jeffrey MD    PGY-3 Psychiatry  NAVIGATE Individual Resiliency Trainer   "

## 2024-12-02 DIAGNOSIS — F29 PSYCHOSIS, UNSPECIFIED PSYCHOSIS TYPE (H): ICD-10-CM

## 2024-12-02 NOTE — TELEPHONE ENCOUNTER
Can't schedule injection appointment until they receive refills. Patient received injection today.

## 2024-12-10 ENCOUNTER — TELEPHONE (OUTPATIENT)
Dept: PSYCHIATRY | Facility: CLINIC | Age: 34
End: 2024-12-10

## 2024-12-10 NOTE — TELEPHONE ENCOUNTER
Fax: [phone] please fax over new order for the infusions. The last order was . [phone] have Dr. Guzmán call for any questions.

## 2024-12-16 ENCOUNTER — TELEPHONE (OUTPATIENT)
Dept: PSYCHIATRY | Facility: CLINIC | Age: 34
End: 2024-12-16

## 2024-12-16 NOTE — TELEPHONE ENCOUNTER
Invega was sent to [facility] but needs to be sent to [facility] [address] [place] [address] for pt to schedule appts at that location. They have the new dosage sent they just need the refills to be added.      Patient requesting a call back [phone]

## 2024-12-17 NOTE — TELEPHONE ENCOUNTER
Writer spoke with Sue at [facility], they have orders and have 6 refills.  They will give her a call to schedule.

## 2024-12-18 ENCOUNTER — PATIENT OUTREACH (OUTPATIENT)
Dept: PSYCHIATRY | Facility: CLINIC | Age: 34
End: 2024-12-18

## 2024-12-18 DIAGNOSIS — F29 PSYCHOSIS, UNSPECIFIED PSYCHOSIS TYPE (H): Primary | ICD-10-CM

## 2024-12-20 ENCOUNTER — VIRTUAL VISIT (OUTPATIENT)
Dept: PSYCHIATRY | Facility: CLINIC | Age: 34
End: 2024-12-20
Payer: COMMERCIAL

## 2024-12-20 DIAGNOSIS — F29 PSYCHOSIS, UNSPECIFIED PSYCHOSIS TYPE (H): Primary | ICD-10-CM

## 2024-12-20 NOTE — PROGRESS NOTES
NAVIGJESUS Clinician Contact & Progress Note  For Individual Resiliency Training (IRT)  A Part of the [facility] First Episode of Psychosis Program    NAVIGATE Enrollee: Laura Webb (1990)     MRN: [id number]  Date:  12/20/24  Diagnosis:   Psychosis, unspecified,  PTSD  Clinician: MILAN Individual Resiliency Trainer, Monique Jeffrey MD     1. Type of contact: (majority of time spent)  IRT Session via telehealth  Mode of communication: [employer] (HIPAA compliant, secure platform). Patient consented verbally to this mode of therapy today.  Reason for telehealth: To reduce barriers in accessing services, due to but not limited to transportation, location, or scheduling reasons.    *Visit initially done through telehealth, though due connection issues, we switch to telephone visit early in the encounter*    2. People present:   Writer  Client: Yes - Presented alone    3. Length of Actual Contact: Start Time: 9:35; End Time: 10:00     4. Location of contact:  Originating Location (patient location): Located in [place]  Distant Location (provider location): [place], [place]    5. Did the client complete the home practice option(s) from the previous session: Not Applicable    6. Motivational Teaching Strategies:  Connect info and skills with personal goals    7. Educational Teaching Strategies:  Review of written material/education  Relate information to client's experience  Ask questions to check comprehension    8. CBT Teaching Strategies:  N/A    9. IRT Module(s) Addressed:  Introduction To Dealing  With Negative Feelings: CBT triangle    10. Techniques utilized:   [place] announced at beginning of session  Review of goal  Present new material    11. Measures:    Mental Status Exam  Alertness: alert  and oriented  Behavior/Demeanor: somewhat guarded , always listening and does respond  Speech: increased latency of response and regular rate and rhythm, decreased speech quantity (short  "responses)  Language: no obvious problem.   Mood: ok  Affect: Unable to assess since primarily telephone encounter   Thought Process/Associations: linear, goal directed  Thought Content:  Reports none;  Denies SI  Insight: unable to assess from limited encounter  Judgment: good  Cognition: does  appear grossly intact; formal cognitive testing was not done    12. Assessment/Progress Note:     Interventions:   Check in  Start CBT fundamentals-CBT triangle, introduction to common styles of thinking.   Satisfactions in Areas of Life Module     1.Check in:   -Celebrated thanksgiving.   -Going to spend [place] with siblings. In [place]. Sees them often.   -Holidays are both stressful and positive.    -Stress in terms of getting prepared, presents, getting food.   -Work: Interviewing for jobs now. No prospects  yet. Not interested in further assistance from NAVIGATE for this at this time.     2.CBT Triangle  -Introduced CBT triangle. Reviewed examples from module.   -Briefly introduced some common styles of thinking:    -Based off some examples of discussion, Laura shared she identifies with: over generalization, catastrophizing, emotional reasoning, overestimation of risk.     13. Plan/Referrals:   -Meet next week at 9:15. Will plan for future visits at this time.   -I will plan to send link to both email and phone.     Billing for \"Interactive Complexity\"?    No    Monique Jeffrey MD    PGY-3 Psychiatry  NAVIGATE Individual Resiliency Trainer        -  "

## 2024-12-27 ENCOUNTER — VIRTUAL VISIT (OUTPATIENT)
Dept: PSYCHIATRY | Facility: CLINIC | Age: 34
End: 2024-12-27
Payer: COMMERCIAL

## 2024-12-27 DIAGNOSIS — F29 PSYCHOSIS, UNSPECIFIED PSYCHOSIS TYPE (H): Primary | ICD-10-CM

## 2024-12-27 NOTE — PROGRESS NOTES
NAVIGJESUS Clinician Contact & Progress Note  For Individual Resiliency Training (IRT)  A Part of the [facility] First Episode of Psychosis Program    NAVIGATE Enrollee: Laura Webb (1990)     MRN: [id number]  Date:  12/27/24  Diagnosis:   Psychosis, unspecified,  PTSD  Clinician: MILAN Individual Resiliency Trainer, Monique Jeffrey MD     1. Type of contact: (majority of time spent)  IRT Session via telehealth.  Mode of communication: [employer] Well (HIPAA compliant, secure platform). Patient consented verbally to this mode of therapy today.  Reason for telehealth: To reduce barriers in accessing services, due to but not limited to transportation, location, or scheduling reasons.    *Visit initially done through telehealth, though due connection issues, we switch to telephone visit early in the encounter*    2. People present:   Writer  Client: Yes - Presented alone    3. Length of Actual Contact: Start Time: 9:35; End Time: 10:00     4. Location of contact:  Originating Location (patient location): Located in [place]  Distant Location (provider location): [place], [place]    5. Did the client complete the home practice option(s) from the previous session: Not Applicable    6. Motivational Teaching Strategies:  Connect info and skills with personal goals    7. Educational Teaching Strategies:  Review of written material/education  Relate information to client's experience  Ask questions to check comprehension    8. CBT Teaching Strategies:  N/A    9. IRT Module(s) Addressed:  Introduction To Dealing  With Negative Feelings: CBT triangle    10. Techniques utilized:   [place] announced at beginning of session  Review of goal  Present new material    11. Measures:    Mental Status Exam  Alertness: alert  and oriented  Behavior/Demeanor: somewhat guarded , always listening and does respond  Speech: increased latency of response and regular rate and rhythm, decreased speech quantity (short  "responses)  Language: no obvious problem.   Mood: ok  Affect: Unable to assess since primarily telephone encounter   Thought Process/Associations: linear, goal directed  Thought Content:  Reports none;  Denies SI  Insight: unable to assess from limited encounter  Judgment: good  Cognition: does  appear grossly intact; formal cognitive testing was not done    12. Assessment/Progress Note:     Interventions:   Check in  Continue CBT fundamentals-CBT triangle, introduction to common styles of thinking.     1.Check in  -Holidays were nice, not stressful. Saw brothers and sister.  -Did not have any other updates otherwise.    -  2. CBT triangle:    -Continued to provide overview on CBT fundamentals, triangle,  introduction to common styles of thinking. Laura shared that she does still struggle with blaming self sometimes for psychosis.    -I asked Laura to identify a positive or stressful situation. Laura was able to come up with one.    -I asked Laura to think of some examples for the next visit which she agreed to.      3. 5-4-3-2-1 exercise    13. Plan/Referrals:   -Meet next week at 9:15.     Billing for \"Interactive Complexity\"?    No    Monique Jeffrey MD    PGY-3 Psychiatry  NAVIGATE Individual Resiliency Trainer   "

## 2025-01-03 ENCOUNTER — PATIENT OUTREACH (OUTPATIENT)
Dept: PSYCHIATRY | Facility: CLINIC | Age: 35
End: 2025-01-03

## 2025-01-08 ENCOUNTER — VIRTUAL VISIT (OUTPATIENT)
Dept: PSYCHIATRY | Facility: CLINIC | Age: 35
End: 2025-01-08
Payer: COMMERCIAL

## 2025-01-08 DIAGNOSIS — F29 PSYCHOSIS, UNSPECIFIED PSYCHOSIS TYPE (H): Primary | ICD-10-CM

## 2025-01-08 NOTE — PROGRESS NOTES
NAVIGATE/ESMD Virtual Visit Progress Note  For Supported Employment & Education    NAVIGATE Enrollee: Laura Webb (1990)     MRN: [id number]  Date of Visit: 1/08/25  Contacted: Laura  Appointment Length: 45 mins    Discussed:   Writer met with Laura Webb for virtual visit check-in. Reason for telehealth: To reduce barriers in accessing services, due to but not limited to transportation, location, or scheduling reasons. Meeting agenda included: Intros, discuss SEE objectives    Follow-up: Will contact writer to schedule next appt.    Presented overview of SEE program goals and purpose.   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 Going back to school, and finding a job in the Public Health field are goals she discussed.     We also discussed how [employer] could be a resource in job search and career planning. Writer will email information/resource to her.        KVNG Renae  NAVIGATE/ESMD  Supported Employment & Education  Supported Employment & EducationThis is a non-billable encounter as it was solely for the purposes of outreach and/or care coordination. SEE services are non billable services

## 2025-01-10 ENCOUNTER — VIRTUAL VISIT (OUTPATIENT)
Dept: PSYCHIATRY | Facility: CLINIC | Age: 35
End: 2025-01-10
Payer: COMMERCIAL

## 2025-01-10 DIAGNOSIS — F29 PSYCHOSIS, UNSPECIFIED PSYCHOSIS TYPE (H): Primary | ICD-10-CM

## 2025-01-10 NOTE — PROGRESS NOTES
NAVIGJESUS Clinician Contact & Progress Note  For Individual Resiliency Training (IRT)  A Part of the [facility] First Episode of Psychosis Program    NAVIGATE Enrollee: Laura Webb (1990)     MRN: [id number]  Date:  1/10/25  Diagnosis: Psychosis, unspecified,  PTSD  Clinician: [place] Individual Resiliency Trainer, Monique Jeffrey MD     1. Type of contact: (majority of time spent)  IRT Session via telehealth  Mode of communication: [employer] (HIPAA compliant, secure platform). Patient consented verbally to this mode of therapy today.  Reason for telehealth: To reduce barriers in accessing services, due to but not limited to transportation, location, or scheduling reasons.    2. People present:   Writer  Client: Yes - presented alone.     3. Length of Actual Contact: Start Time: 9:35; End Time: 10:00     4. Location of contact:  Originating Location (patient location): located in [place]  Distant Location (provider location): [place], [place]    5. Did the client complete the home practice option(s) from the previous session: Not Applicable    6. Motivational Teaching Strategies:  Connect info and skills with personal goals    7. Educational Teaching Strategies:  Review of written material/education  Relate information to client's experience  Ask questions to check comprehension  Break down information into small chunks    8. CBT Teaching Strategies:  Reinforcement and shaping (positive feedback for steps towards goals and follow-through on home assignments)  Cognitive restructuring (identify thoughts related to negative feelings)    9. IRT Module(s) Addressed:  Module 8 - Dealing with Negative Feelings  Having fun module.     10. Techniques utilized:   [place] announced at beginning of session  Review of homework  Review of goal  Review of previous meeting  Present new material    11. Measures:    Mental Status Exam  Alertness: alert  and oriented  Behavior/Demeanor: somewhat guarded , always listening  "and does respond  Speech: increased latency of response and regular rate and rhythm, decreased speech quantity (short responses)  Language: no obvious problem.   Mood: ok  Affect: some restriction in range; congruent with mood  Thought Process/Associations: linear, goal directed  Thought Content:  Reports none;  Denies SI  Insight: unable to assess from limited encounter  Judgment: good  Cognition: does  appear grossly intact; formal cognitive testing was not done    12. Assessment/Progress Note:     Check in  Continue CBT fundamentals-CBT triangle, introduction to common styles of thinking.   Start Having Fun Module     Laura did not have any major updates from this past week.     2. Started to go through CBT/Dealing with negative feelings. Though Laura did not relate some of the examples and felt that we had already reviewed this enough.  I attempted to discuss that CBT is a lifelong practice and what the objectives of CBT are. We agreed to revisit this and Laura was open to me sending more information on CBT so she could have a better understanding.     Started the having fun module. Laura said that she is in the process of trying to figure out what she enjoys and was agreeable to go through the list of activities. Reviewed importance of fun and went through the list of activities.Things she currently enjoys: reading, movies (though doesn't have anyone to go with). Things she would be open to doing: pottery (took a class in high school), knitting/crocheting (would need to learn), board games (likes a game noodle), video games though said she doesn't have board games or video games at home.     I said that I would look into resources for this. I requested that Laura try an acitvity-she said reading and note how she feels after she does it.       13. Plan/Referrals:     -Follow up next week.   -Continue having fun module  -Will look into ways to re-introduce CBT in future sessions    Billing for \"Interactive " "Complexity\"?    No  Monique Jeffrey MD    PGY-3 Psychiatry  [facility]   NAVIGATE Individual Resiliency Trainer      "

## 2025-01-17 ENCOUNTER — VIRTUAL VISIT (OUTPATIENT)
Dept: PSYCHIATRY | Facility: CLINIC | Age: 35
End: 2025-01-17
Payer: COMMERCIAL

## 2025-01-17 DIAGNOSIS — F29 PSYCHOSIS, UNSPECIFIED PSYCHOSIS TYPE (H): Primary | ICD-10-CM

## 2025-01-17 NOTE — PROGRESS NOTES
NAVIGJESUS Clinician Contact & Progress Note  For Individual Resiliency Training (IRT)  A Part of the [facility] First Episode of Psychosis Program    NAVIGATE Enrollee: Laura Webb (1990)     MRN: [id number]  Date:  1/17/25  Diagnosis: Psychosis, unspecified,  PTSD  Clinician: [place] Individual Resiliency Trainer, Monique Jeffrey MD     1. Type of contact: (majority of time spent)  IRT Session via telehealth  Mode of communication: [employer] (HIPAA compliant, secure platform). Patient consented verbally to this mode of therapy today.  Reason for telehealth: To reduce barriers in accessing services, due to but not limited to transportation, location, or scheduling reasons.    2. People present:   Writer  Client: Yes - presented alone.     3. Length of Actual Contact: Start Time: 9:45; End Time: 10:02     4. Location of contact:  Originating Location (patient location): located in [place]  Distant Location (provider location): [place], [place]    5. Did the client complete the home practice option(s) from the previous session: Not Applicable    6. Motivational Teaching Strategies:  Connect info and skills with personal goals    7. Educational Teaching Strategies:  Review of written material/education  Relate information to client's experience  Ask questions to check comprehension  Break down information into small chunks    8. CBT Teaching Strategies:  Reinforcement and shaping (positive feedback for steps towards goals and follow-through on home assignments)    9. IRT Module(s) Addressed:  Having fun module.   Developing Resiliency Module-Savoring     10. Techniques utilized:   [place] announced at beginning of session  Review of homework  Review of goal  Review of previous meeting  Present new material    11. Measures:    Mental Status Exam  Alertness: alert  and oriented  Behavior/Demeanor: somewhat guarded , always listening and does respond  Speech: increased latency of response and regular rate  "and rhythm, decreased speech quantity (short responses)  Language: no obvious problem.   Mood: ok  Affect: some restriction in range; congruent with mood  Thought Process/Associations: linear, goal directed  Thought Content:  Reports none;  Denies SI  Insight: unable to assess from limited encounter  Judgment: good  Cognition: does  appear grossly intact; formal cognitive testing was not done    12. Assessment/Progress Note:     Check in  Follow up on Having Fun Module  Developing Resiliency-Savoring Module     Laura shared that she took her 7 yo daughter to a dance event. Otherwise, she did not share any updated.     2. We agreed to put a pause on the CBT modules for now though may revisit later. I asked Laura if she tried any fun activities. She said the she has been reading a book. She was ok with me sending the links to community activities though she said that she is already busy and probably doesn't have time to search for new activities at this time.     3. We discussed starting to go through/explore different resiliency activities so that she can see if there any that she likes and would like to try moving forward. We started with the savoring module. We did the trying food activity together. Laura tried this with a cracker and I did it with my coffee. Laura is going to bring a fruit next time and I asked to try it with one activity at home. I let her know that I will do it with her.     13. Plan/Referrals:     -Follow up next week.   -Continuing Developing Resiliency Activities and follow up on Savoring.     Billing for \"Interactive Complexity\"?    No  Monique Jeffrey MD    PGY-3 Psychiatry  [facility]   NAVIGATE Individual Resiliency Trainer     "

## 2025-01-19 ENCOUNTER — HEALTH MAINTENANCE LETTER (OUTPATIENT)
Age: 35
End: 2025-01-19

## 2025-01-24 ENCOUNTER — VIRTUAL VISIT (OUTPATIENT)
Dept: PSYCHIATRY | Facility: CLINIC | Age: 35
End: 2025-01-24
Payer: COMMERCIAL

## 2025-01-24 DIAGNOSIS — F29 PSYCHOSIS, UNSPECIFIED PSYCHOSIS TYPE (H): Primary | ICD-10-CM

## 2025-01-24 NOTE — PROGRESS NOTES
NAVIGJESUS Clinician Contact & Progress Note  For Individual Resiliency Training (IRT)  A Part of the [facility] First Episode of Psychosis Program    NAVIGATE Enrollee: Laura Webb (1990)     MRN: [id number]  Date:  1/24/25  Diagnosis: Psychosis, unspecified,  PTSD  Clinician: [place] Individual Resiliency Trainer, Monique Jeffrey MD     1. Type of contact: (majority of time spent)  IRT Session via telehealth  Mode of communication: [employer] Well (HIPAA compliant, secure platform). Patient consented verbally to this mode of therapy today.  Reason for telehealth: To reduce barriers in accessing services, due to but not limited to transportation, location, or scheduling reasons.    2. People present:   Writer  Client: Yes - presented alone.     3. Length of Actual Contact: Start Time: 9:35; End Time: 10:00     4. Location of contact:  Originating Location (patient location): located in [place]  Distant Location (provider location): [place], [place]    5. Did the client complete the home practice option(s) from the previous session: Not Applicable    6. Motivational Teaching Strategies:  Connect info and skills with personal goals    7. Educational Teaching Strategies:  Review of written material/education  Relate information to client's experience  Ask questions to check comprehension  Break down information into small chunks    8. CBT Teaching Strategies:  Reinforcement and shaping (positive feedback for steps towards goals and follow-through on home assignments)    9. IRT Module(s) Addressed:  Developing Resiliency Module-Savoring , Acts of Kindness    10. Techniques utilized:   [place] announced at beginning of session  Review of homework  Review of goal  Review of previous meeting  Present new material    11. Measures:    Mental Status Exam  Alertness: alert  and oriented  Behavior/Demeanor: somewhat guarded , always listening and does respond  Speech: increased latency of response and regular rate and  rhythm, decreased speech quantity (short responses)  Language: no obvious problem.   Mood: ok  Affect: some restriction in range; congruent with mood; brightens appropriately  Thought Process/Associations: linear, goal directed  Thought Content:  Reports none;  Denies SI  Insight: unable to assess from limited encounter  Judgment: good  Cognition: does  appear grossly intact; formal cognitive testing was not done    12. Assessment/Progress Note:     Check in  Continuing Developing Resiliency-Savoring and Acts of Kindness  5-4-3-2-1 mc      Laura said that she does not have any updates from this past week.     2. We continued the savoring module. Laura said that she tried savoring with eating an orange. She said that she shared this experience with her brother too. I shared my savoring activity from this past week as well. Laura said that it is sometimes hard to do and find time for but she is open to trying it. She agreed to try it again next week. I said that this is something she can even try with her kids. I also encouraged her to note how she feels before, during and after doing this. We also started acts of kindness. When I asked Laura if she is familiar with this, she said yes and that this is a big part of her life already. She shared her various volunteering activities and some of the ways that she has given back through her camila. She also shared her challenges with limited resources living in [facility] for about 5 years and how she has been and continues to  want to give back because of this. She was open to continuing with this module. We talked about how acts of kindness and volunteering may be a way for her to connect with people. She was open to trying it this week-specifically she is going to call and check in on a family member or friend. Again for this activity, I encouraged Laura to make a note of how she feels before, during and after.     3. We did the 5-4-3-2-1 grounding technique. Laura  "requested for me to send this to her.     13. Plan/Referrals:     -Follow up next week.   -Continuing Developing Resiliency Activities and follow up on Savoring, Acts of kindness HW.     Billing for \"Interactive Complexity\"?    No  Monique Jeffrey MD    PGY-3 Psychiatry  [facility]   NAVIGATE Individual Resiliency Trainer     "

## 2025-01-30 ENCOUNTER — PATIENT OUTREACH (OUTPATIENT)
Dept: PSYCHIATRY | Facility: CLINIC | Age: 35
End: 2025-01-30

## 2025-01-31 ENCOUNTER — VIRTUAL VISIT (OUTPATIENT)
Dept: PSYCHIATRY | Facility: CLINIC | Age: 35
End: 2025-01-31
Payer: COMMERCIAL

## 2025-01-31 DIAGNOSIS — F29 PSYCHOSIS, UNSPECIFIED PSYCHOSIS TYPE (H): Primary | ICD-10-CM

## 2025-01-31 DIAGNOSIS — Z79.899 ENCOUNTER FOR LONG-TERM CURRENT USE OF MEDICATION: ICD-10-CM

## 2025-01-31 NOTE — PROGRESS NOTES
NAVIGJESUS Clinician Contact & Progress Note  For Individual Resiliency Training (IRT)  A Part of the [facility] First Episode of Psychosis Program    NAVIGATE Enrollee: Laura Webb (1990)     MRN: [id number]  Date:  1/31/25  Diagnosis: Psychosis, unspecified,  PTSD  Clinician: [place] Individual Resiliency Trainer, Monique Jeffrey MD     1. Type of contact: (majority of time spent)  IRT Session via telehealth  Mode of communication: [employer] Well (HIPAA compliant, secure platform). Patient consented verbally to this mode of therapy today.  Reason for telehealth: To reduce barriers in accessing services, due to but not limited to transportation, location, or scheduling reasons.    2. People present:   Writer  Client: Yes - presented alone.     3. Length of Actual Contact: Start Time: 9:27; End Time: 9:50     4. Location of contact:  Originating Location (patient location): located in [place]  Distant Location (provider location): [place], [place]    5. Did the client complete the home practice option(s) from the previous session: Not Applicable    6. Motivational Teaching Strategies:  Connect info and skills with personal goals    7. Educational Teaching Strategies:  Review of written material/education  Relate information to client's experience  Ask questions to check comprehension  Break down information into small chunks    8. CBT Teaching Strategies:  Reinforcement and shaping (positive feedback for steps towards goals and follow-through on home assignments)    9. IRT Module(s) Addressed:  Developing Resiliency Module-Savoring , Acts of Kindness, Gratitude    10. Techniques utilized:   [place] announced at beginning of session  Review of homework  Review of goal  Review of previous meeting  Present new material    11. Measures:    Mental Status Exam  Alertness: alert  and oriented  Behavior/Demeanor: somewhat guarded though more open today, always listening and does respond  Speech: increased latency  "of response and regular rate and rhythm, decreased speech quantity (short responses)  Language: no obvious problem.   Mood: ok  Affect: some restriction in range; congruent with mood; brightens appropriately  Thought Process/Associations: linear, goal directed  Thought Content:  Reports none;  Denies SI  Insight: unable to assess from limited encounter  Judgment: good  Cognition: does  appear grossly intact; formal cognitive testing was not done    12. Assessment/Progress Note:     Check in  Continuing Developing Resiliency-Savoring and Acts of Kindness  Start Gratitude Resiliency Activity     Laura said that she does not have any updates from this past week and that things are \"ok\".    2.   -Follow up homework:   -Savoring: Did with it kids-made caramel apples.     -Talked about the textures, flavors,      -Savoring questions: felt maribell afterwards. Felt like she appreciated the moment.     -Barriers to doing this: remembering. We discussed trying this once a week for now and I will follow up on this.      -Acts of kindness:     -Acts of kindness questions: Didn't do this week -didn't have time.     -Talked about Laura's numerous acts of kindness in the past. How she felt afterwards; maribell, peace, excitement.     -She agreed that these emotions inspired her to do more acts of kindness.     -Tied this back to CBT triangle and Laura acknowledged this.     3. We started the gratitude resiliency activity. Laura gave examples of when she showed gratitude-ie, thanking  after he puts food away and it's late at night. Also discussed when she received gratitude-kids thanking her for giving them food and helping them with shower. This led to a conversation on identifying emotions. Laura shared that she struggles with this and struggles with indecisiveness.  I asked if she has examples : She shared that she was invited to a [employer] social event and she's not sure how she feels about going. When  I asked about her underlying " "thoughts, she said that she is worried that she won't really know the people there. She agreed that she is hoping to meet new people but does have an underlying fear of not being accepted into the group as they all know each other already. I asked about if there have been past situations like this. Laura said she was invited to events in the past but she knew more of the people. She did say that she was taken by surprise at the past event as she saw someone she knew there that she wasn't expecting  and that person introduced her to other people. She agreed that being taken by surprise in a positive way could be a possibility moving forward. We also reviewed things that Laura did that made things go well in the last event and that included : 1) attending the event and giving herself the opportunity to connect and 2) allowing her self to connect with people there. We talked about how at this upcoming event she can have control over the situation as well such as with  connecting with people and that she can also have control over when she leaves (if she is really feeling uncomfortable).      We agreed to work on social skills for next weeks module, including making small talk, etc.     13. Plan/Referrals:     -Follow up next week.   -Continue with resiliency activities.   -We will start working on social skills/connecting with people.     Billing for \"Interactive Complexity\"?    No    Monique Jeffrey MD    PGY-3 Psychiatry  [facility]   NAVIGATE Individual Resiliency Trainer         "

## 2025-01-31 NOTE — PROGRESS NOTES
Is the patient currently in the state of [place]? YES    Visit mode:VIDEO    If the visit is dropped, the patient can be reconnected by: VIDEO VISIT: Text to cell phone: [phone]    Will anyone else be joining the visit? NO      How would you like to obtain your AVS? MyChart    Are changes needed to the allergy or medication list? NO    Reason for visit: Follow Up     NAVIGATE Patient Self-Rating Form    Since your last medication management visit--    Have you been feeling depressed, sad, or down? No  Have you been feeling anxious, worried or nervous? No  Have you been thinking about death or have you had any feelings that you would be better off dead? No   Have you been feeling particularly good? Yes  Have you been feeling annoyed, angry, or resentful (whether you showed it or not)? No  Did you do anything that could have gotten you in trouble? No  Have you felt dizzy or faint? No  Have you had blurred vision? No  Have you had dry mouth? No  Have you had too much saliva in your mouth or had drooling? No  Have you felt nauseous? No  Have you been constipated? No  Has you appetite for food been increased? No  Have you gained weight? No  Have you lost weight? No  Have you felt restless or like you cannot sit still? No  Any shaking of your hands, legs, or other muscles? No  Any problems walking or moving or any problems feeling stiff or rigid? No  Have you felt tired or fatigued? No  Have you felt drowsy during the day? No  Have you been sleeping too much at night? No  Have you been sleeping too little or had problems sleeping at night? No  Any decrease in your interest in sex? No  Any other problems with sex? No  Any problems with your breasts such as swelling or discharge? No  For women, any problems with your period? Yes  Are there other medical or side effect problems you wish to discuss with your prescriber? No  Since your last visit, how many days have you not taken your medication? 0  Have you had trouble  "remembering to take your medication? No  Do you find the number of medicines or the times when you are supposed to take then confusing or burdensome? No  Are you afraid of the medication? No  Do you think that you have an illness that requires taking medication? Yes  Do you think that other people would think poorly of you if they knew that you take medication? No  On average, how many cigarettes do you smoke per day? 0  Since you last visit, did you drink alcohol? No  Since your last visit, have you used any marijuana? No  Since your last visit, have you used any stress drugs other than marijuana? No  Between now and your next visit, do you think we should keep your medication the same or consider changing the medications? \"Keep it the same\"    "

## 2025-01-31 NOTE — PROGRESS NOTES
"Virtual Visit Details    Originating Location (pt. Location): Home    Distant Location (provider location):  On-site  Platform used for Video Visit: [employer]    ERNESTOfor[MD] Medication Management Progress Note  A Part of the [facility] First Episode of Psychosis Program    NAVIGATE Enrollee: Laura Webb (1990)     MRN: [id number]  Date:  1/31/25         Contributors to the Assessment     Chart Reviewed.   Interview completed with Laura Webb.  Collateral information obtained from none.         Chief Complaint     \"Doing OK\"         Interim History    Laura Webb is a 34 year old female who was last seen one month ago  at which time no changes were made. The patient reports good treatment adherence.  History was provided by Laura who was a good historian.  Since the last visit:  Laura reports she is doing well overall. Feels no significant changes after another month on the lower dose of the medications. Reports anxiety is improved somewhat, denies recent panic attacks. Denies stiffness, cognitive concerns.    RECENT SOCIAL HISTORY:  SEE HPI    Medical ROS:  none         First Episode of Psychosis History      DUP (duration untreated psychosis):  several days to eight months  Route to initial care: hospitalization  Medication adherence overall:  See above, Clinician Rating Form in COMPASS Item 13  General frequency of visits:  monthly  Participation in groups:  No    Reviewed for completion of First Episode work-up:  Yes  First episode workup:  Not Done (if completed, see LABS for results)  MATRICS Consensus Cognitive Battery:  Not Done (if completed, see LABS for results)       Medical/Surgical History     Blood-group specific substance    Patient Active Problem List   Diagnosis    Psychosis, unspecified psychosis type (H)          Medications     Current Outpatient Medications   Medication Sig Dispense Refill    benztropine (COGENTIN) 1 MG tablet Take 1 tablet (1 mg) by mouth daily. 30 tablet 4    " "paliperidone (INVEGA SUSTENNA) 78 MG/0.5ML Inject 0.5 mLs (78 mg) into the muscle every 4 weeks. 1 mL 5          Vitals     There were no vitals taken for this visit.          Mental Status Exam     Alertness: alert  and oriented  Appearance: well groomed  Behavior/Demeanor: pleasant and calm, with good  eye contact  Speech: normal;   Language: no obvious problem  Psychomotor: normal or unremarkable  Mood: \"good\"  Affect: blunted; was congruent to mood; was congruent to content  Thought Process/Associations: unremarkable  Thought Content:  Reports none;  Denies suicidal and violent ideation and delusions  Perception:  Reports none;  Denies auditory hallucinations and visual hallucinations  Insight:  fair  Judgment: good, adequate for safety  Cognition: does  appear grossly intact; formal cognitive testing was not done         Labs and Data     RATING SCALES: AIMS due    PHQ9 TODAY =       5/17/2024    10:31 AM 6/14/2024     8:11 AM 7/19/2024     9:30 AM   PHQ-9 SCORE   PHQ-9 Total Score 1 3 0       ANTIPSYCHOTIC LABS ROUTINE    [glu, A1C, lipids (focus LDL), liver enzymes, WBC, ANEU, Hgb, plts]   q12 mo  No lab results found.  No lab results found.  No lab results found.  No lab results found.    Narrative & Impression   EXAM: MR BRAIN W/O and W CONTRAST  LOCATION: [facility]  DATE: 8/22/2023     INDICATION:  Psychosis, unspecified psychosis type (H)  COMPARISON: None.  CONTRAST: 7ml Gadavist  TECHNIQUE: Routine multiplanar multisequence head MRI without and with intravenous contrast.     FINDINGS:  INTRACRANIAL CONTENTS: No acute or subacute infarct. No mass, acute hemorrhage, or extra-axial fluid collections. Normal brain parenchymal signal. Normal ventricles and sulci. Normal position of the cerebellar tonsils. No pathologic contrast enhancement.   Posterior fossa structures including cerebellar hemispheres, fourth ventricle and CP angle cisterns are clear. Nasopharynx is clear. Region " of the sella and suprasellar cistern are clear.     SELLA: No abnormality accounting for technique.     OSSEOUS STRUCTURES/SOFT TISSUES: Normal marrow signal. The major intracranial vascular flow voids are maintained.      ORBITS: No abnormality accounting for technique.      SINUSES/MASTOIDS: No paranasal sinus mucosal disease. No middle ear or mastoid effusion.                                                                    IMPRESSION:  1.  Normal head MRI.            Psychiatric Diagnoses     Schizophreniform disorder, with positive prognostic features  H/o catatonia  Likely PTSD         Assessment     From intake, 8/4/23: Laura Webb is a 32 year old  Black or  Not  or  female who presented for a comprehensive assessment of psychiatric symptoms by the First Episode of Psychosis Navigate Program. Diagnostically challenging given single-episode of mental illness. Differential is broad including psychosis due to medical condition (seizure), bipolar disorder, MDD single episode with psychotic features. Substances do not appear to be a contributing factor. Schizophrenia also considered, though unlikely given rapid-onset of her symptoms and high cognitive and psychosocial functioning following her first episode / hospitalization. Her chart review and interview today present as most consistent with schizophreniform disorder. Prognosis is fair-to-good. Rapid onset of symptoms is positive predictive factor for resolution of symptoms / return to baseline.   We discussed this assessment with her today. We discussed our recommendations: to continue current medications, complete her medical / neurological workup to rule-out underlying epileptic and autoimmune etiologies. Recommend continuing long-acting-injectable for now, ideally for 1 year following resolution of her psychotic symptoms. She expressed understanding of this plan.      TODAY Lauar reports interim stability, no  changes. Due for labs; will fax to [facility].    SUICIDE RISK ASSESSMENT-  Risk factors for self-harm:  none .  Mitigating factors: no h/o suicide attempt, no plan or intent, describes a safety plan, h/o seeking help , future oriented, commitment to family, good social support  , stable housing, stable finances, and Gnosticist beliefs.  The patient does not appear to be at imminent risk for self-harm, hospitalization is not recommended which the pt does  agree with. No hospitalization will be arranged. Based on degree of symptoms close psych follow-up was/were recommended which the pt does  agree to.     PSYCHOTROPIC DRUG INTERACTIONS:   None.  MANAGEMENT:  N/A         Plan   1) Medications   - Continue Invega Sustenna 79 mg IM q28 days (gets this at [facility])  - continue benztropine 1 mg PO daily      2) Therapy- continue weekly IRT      3) Next Due   Labs- remaining first episode labs ordered 08/10/23, still pending; now due for AP monitoring labs, ordered and pending  EKG- last completed 4/20/23, repeat if increasing or adding Qtc- prolonging medications   Rating scales- AIMS: due      4) Referrals  Medical concerns- seizure history, neurology referral for EEG declined by patient       5) RTC: 1 month.      6) Crisis Numbers:   Provided routinely in AVS     After hours:  [phone]    TREATMENT RISK STATEMENT:  The risks, benefits, alternatives and potential adverse effects have been discussed and are understood by the pt. The pt understands the risks of using street drugs or alcohol. There are no medical contraindications, the pt agrees to treatment with the ability to do so. The pt knows to call the clinic for any problems or to access emergency care if needed.  Medical and substance use concerns are documented above.  Psychotropic drug interaction check was done, including changes made today.    Yaneth Guzmán MD  Navigate Psychiatrist  , Department of Psychiatry and Behavioral  Sciences  [facility] [employer]    The longitudinal plan of care for the diagnosis(es)/condition(s) as documented were addressed during this visit. Due to the added complexity in care, I will continue to support Laura in the subsequent management and with ongoing continuity of care.

## 2025-02-07 ENCOUNTER — VIRTUAL VISIT (OUTPATIENT)
Dept: PSYCHIATRY | Facility: CLINIC | Age: 35
End: 2025-02-07
Payer: COMMERCIAL

## 2025-02-07 DIAGNOSIS — F29 PSYCHOSIS, UNSPECIFIED PSYCHOSIS TYPE (H): Primary | ICD-10-CM

## 2025-02-07 NOTE — PROGRESS NOTES
NAVIGJESUS Clinician Contact & Progress Note  For Individual Resiliency Training (IRT)  A Part of the [facility] First Episode of Psychosis Program    NAVIGATE Enrollee: Laura Webb (1990)     MRN: [id number]  Date:  2/07/25  Diagnosis: Psychosis, unspecified,  PTSD  Clinician: MILAN Individual Resiliency Trainer, Monique Jeffrey MD     1. Type of contact: (majority of time spent)  IRT Session via telehealth  Mode of communication: [employer] (HIPAA compliant, secure platform). Patient consented verbally to this mode of therapy today.  Reason for telehealth: To reduce barriers in accessing services, due to but not limited to transportation, location, or scheduling reasons.    2. People present:   Writer  Client: Yes - presented alone.     3. Length of Actual Contact: Start Time: 9:41; End Time: 10:01     4. Location of contact:  Originating Location (patient location): located in [place]  Distant Location (provider location): [place], [place]    5. Did the client complete the home practice option(s) from the previous session: Not Applicable    6. Motivational Teaching Strategies:  Connect info and skills with personal goals    7. Educational Teaching Strategies:  Review of written material/education  Relate information to client's experience  Ask questions to check comprehension  Break down information into small chunks    8. CBT Teaching Strategies:  Reinforcement and shaping (positive feedback for steps towards goals and follow-through on home assignments)    9. IRT Module(s) Addressed:  Developing Resiliency Module-Savoring , Acts of Kindness, Gratitude  Connecting with people    10. Techniques utilized:   [place] announced at beginning of session  Review of homework  Review of goal  Review of previous meeting  Present new material    11. Measures:    Mental Status Exam  Alertness: alert  and oriented  Behavior/Demeanor: somewhat guarded though more open today, always listening and does  "respond  Speech: increased latency of response and regular rate and rhythm, decreased speech quantity (short responses)  Language: no obvious problem.   Mood: ok  Affect: some restriction in range; congruent with mood; brightens appropriately  Thought Process/Associations: linear, goal directed  Thought Content:  Reports none;  Denies SI  Insight: unable to assess from limited encounter  Judgment: good  Cognition: does  appear grossly intact; formal cognitive testing was not done    12. Assessment/Progress Note:     Check in  Continuing Developing Resiliency-Savoring and Acts of Kindness  Connecting with people module       Things have been good.    -Life has been going well. Nothing in particular.   -Son Nicola, 3 years old, on the visit.     2. Savoring:    -Yesterday, went to [employer]. When she was eating and trying to savor the meal. Felt like she \"kind of experience it more\".   -Acts of kindness or gratitude: not really.   -She did review the emotions handout.    -Did this yesterday.   -Went to a parent teacher conference with daughter. Was excited about the things she was hearing about.   -Not going to [employer] event today.    -Decided she didn't want to go. Knows some people there but just doesn't want to go. Tired.     3. Started working on communicating with other people . Talked about importance of building relationships outside of family-Laura recognized this. Laura acknowledged that she's had some distance  from friends as many of her previous [employer] friends have left or moved. She agreed it can be challenging reconnecting with people when this happens.   -We talked about baby steps and starting out small, such as saying hello and how's it going to people at [employer]. We discussed that even just this can help with building confidence around people and while this will likely not turn into a relationship with everyone, it may lead to a friendship down the road and may help with building comfort with talking to " "people.     HW for next week:   Continue to practice savoring 1x/week.   Say \"hello\" and if comfortable, \"how are you\" to two people at [employer] that she hasn't interacted with before.     13. Plan/Referrals:     -Follow up next week.   -Continue with resiliency activities.   -We will start working on social skills/connecting with people.     Billing for \"Interactive Complexity\"?    No    Monique Jeffrey MD    PGY-3 Psychiatry  [facility]   NAVIGATE Individual Resiliency Trainer     "

## 2025-02-21 ENCOUNTER — VIRTUAL VISIT (OUTPATIENT)
Dept: PSYCHIATRY | Facility: CLINIC | Age: 35
End: 2025-02-21
Payer: COMMERCIAL

## 2025-02-21 DIAGNOSIS — F29 PSYCHOSIS, UNSPECIFIED PSYCHOSIS TYPE (H): Primary | ICD-10-CM

## 2025-02-21 NOTE — PROGRESS NOTES
NAVIGJESUS Clinician Contact & Progress Note  For Individual Resiliency Training (IRT)  A Part of the [facility] First Episode of Psychosis Program    NAVIGATE Enrollee: Laura Webb (1990)     MRN: [id number]  Date:  2/21/25  Diagnosis: Psychosis, unspecified,  PTSD  Clinician: MILAN Individual Resiliency Trainer, Monique Jeffrey MD     1. Type of contact: (majority of time spent)  IRT Session via telehealth  Mode of communication: [employer] (HIPAA compliant, secure platform). Patient consented verbally to this mode of therapy today.  Reason for telehealth: To reduce barriers in accessing services, due to but not limited to transportation, location, or scheduling reasons.    2. People present:   Writer  Client: Yes - presented alone.     3. Length of Actual Contact: Start Time: 9:45; End Time: 10:12     4. Location of contact:  Originating Location (patient location): located in [place]  Distant Location (provider location): [place], [place]    5. Did the client complete the home practice option(s) from the previous session: Not Applicable    6. Motivational Teaching Strategies:  Connect info and skills with personal goals    7. Educational Teaching Strategies:  Review of written material/education  Relate information to client's experience  Ask questions to check comprehension  Break down information into small chunks    8. CBT Teaching Strategies:  Reinforcement and shaping (positive feedback for steps towards goals and follow-through on home assignments)    9. IRT Module(s) Addressed:  Developing Resiliency Module-Savoring   Connecting with people    10. Techniques utilized:   [place] announced at beginning of session  Review of homework  Review of goal  Review of previous meeting  Present new material    11. Measures:    Mental Status Exam  Alertness: alert  and oriented  Behavior/Demeanor: somewhat guarded though more open today, always listening and does respond  Speech: increased latency of  response and regular rate and rhythm, decreased speech quantity (short responses)  Language: no obvious problem.   Mood: ok  Affect: some restriction in range; congruent with mood; brighter today, laughing appropriately  Thought Process/Associations: linear, goal directed  Thought Content:  Reports none;  Denies SI  Insight: unable to assess from limited encounter  Judgment: good  Cognition: does  appear grossly intact; formal cognitive testing was not done    12. Assessment/Progress Note:     Check in  Continuing Developing Resiliency-Savoring and Acts of Kindness  Connecting with people module     Check in:   -Things are good.   -Forgot about appointment previous week. It was valentines day.    -Spent time with kids and .   -Past week : it was ok.   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    -Feeling after packing orders: Satisfied.   -Talked about family, who does she live with.    -Lives with , kids, in laws.    -Relationship with in laws: Different seasons with relationship with them. This season ok. There was a point they didn't get along but now it's better. They are not there indefinitely. They came during the summer. They usually travel to different places once in a while.    -Everyone's role: in laws-helps clean around the house, makes meals.    -Laura: Watches the kids, makes sure they have everything they need.    -: He helps out  too but he works from home 3 days and goes in for 2 days a week. He's a . He works 40 hours/week.    -Feels like her role in caring for kids is ok. At a point too in her business where she doesn't need to be as hands on as in the beginning.     Follow Up:   1) Savoring:   -Has been incorporating it when experiencing different things. A few days ago when eating rice w/ stew and chicken. Enjoyed food more.  "    2) Communicating with people.  -Thursday bible study meeting. Talked about attending this with someone.   -Someone else too, talked about meeting up outside of [facility].    -Laura had talked to her in a while. Laura watches kids at [facility] too and she was watching her son. And that lady brought it up on her way out that she forgot to give her a call about hanging out. And she said that she does want to meet with laura.   -Conversations: Can cause some stress/anxiety. Laura shared that she's not always the person who puts her self out there.   -Talked about previous activity she did with Auralie-watching the overwhelming emotions float away on the river. Talked about practice identifying emotions that way it's easier for us to manage them.   - Reviewed some strategies to make new friends.   -Some things that she has in common with this lady  -both mothers, both christians, both more introverts      3) 5-4-3-2-1 exercise.     Hw:  -Work on sharing emotions she's feeling with her kids  -Think about topics that she can talk about with the person from [facility]  -Savoring exercise    13. Plan/Referrals:     -Follow up next week.   -Continue with resiliency activities.   -We will start working on social skills/connecting with people.     Billing for \"Interactive Complexity\"?    No    Monique Jeffrey MD    PGY-3 Psychiatry  [facility]   NAVIGATE Individual Resiliency Trainer     "

## 2025-02-27 ENCOUNTER — TELEPHONE (OUTPATIENT)
Dept: PSYCHIATRY | Facility: CLINIC | Age: 35
End: 2025-02-27

## 2025-02-28 ENCOUNTER — VIRTUAL VISIT (OUTPATIENT)
Dept: PSYCHIATRY | Facility: CLINIC | Age: 35
End: 2025-02-28
Payer: COMMERCIAL

## 2025-02-28 DIAGNOSIS — F29 PSYCHOSIS, UNSPECIFIED PSYCHOSIS TYPE (H): Primary | ICD-10-CM

## 2025-02-28 NOTE — PROGRESS NOTES
NAVIGJESUS Clinician Contact & Progress Note  For Individual Resiliency Training (IRT)  A Part of the [facility] First Episode of Psychosis Program    NAVIGATE Enrollee: Laura Webb (1990)     MRN: [id number]  Date:  2/28/25  Diagnosis: Psychosis, unspecified,  PTSD  Clinician: MILAN Individual Resiliency Trainer, Monique Jeffrey MD     1. Type of contact: (majority of time spent)  IRT Session via telehealth  Mode of communication: [employer] Well (HIPAA compliant, secure platform). Patient consented verbally to this mode of therapy today.  Reason for telehealth: To reduce barriers in accessing services, due to but not limited to transportation, location, or scheduling reasons.    2. People present:   Writer  Client: Yes - presented alone.     3. Length of Actual Contact: Start Time: 9:45; End Time: 10:10     4. Location of contact:  Originating Location (patient location): located in [place]  Distant Location (provider location): [place], [place]    5. Did the client complete the home practice option(s) from the previous session: Not Applicable    6. Motivational Teaching Strategies:  Connect info and skills with personal goals    7. Educational Teaching Strategies:  Review of written material/education  Relate information to client's experience  Ask questions to check comprehension  Break down information into small chunks    8. CBT Teaching Strategies:  Reinforcement and shaping (positive feedback for steps towards goals and follow-through on home assignments)    9. IRT Module(s) Addressed:  Developing Resiliency Module-Savoring   Connecting with people    10. Techniques utilized:   [place] announced at beginning of session  Review of homework  Review of goal  Review of previous meeting  Present new material    11. Measures:    Mental Status Exam  Alertness: alert  and oriented  Behavior/Demeanor: some guarding, more open as the encounter progresses, always listening and does respond  Speech:  "increased latency of response and regular rate and rhythm, decreased speech quantity (short responses)  Language: no obvious problem.   Mood: ok  Affect: some restriction in range; congruent with mood; brighter today, laughing appropriately  Thought Process/Associations: linear, goal directed  Thought Content:  Reports none;  Denies SI  Insight: unable to assess from limited encounter  Judgment: good  Cognition: does  appear grossly intact; formal cognitive testing was not done    12. Assessment/Progress Note:     Check in  Connecting with people module     Check in:  -How was your week: it was good.   -Any events or anything: not really.   -Xiomara and suman:    -Xiomara: Business getting sales.    -Suman: Kids put playdough everywhere. Not sure where they got it from. [employer] .   Homework follow up:   -Savoring exercise:    -Eating spaghetti.    -Discussed trying this next too with something outside of food.   -Discussed sharing her emotions with her kids.   2. Connecting with people:    -Laura shared that she has the lady at [facility]'s number but she doesn't want to be a burden/bother her.    -Talked about strategies to manage social anxiety: reviewed leaves on a stream activity.     -5-4-3-2-1 activity    HW:  -Savoring exercises. Discussed trying something other than food.   -Continue to work on connecting with people  -Try leaves on a stream.     13. Plan/Referrals:     -HW per above  -Continue to work on connecting with people      Billing for \"Interactive Complexity\"?    No    Monique Jeffrey MD    PGY-3 Psychiatry  [facility]   NAVIGATE Individual Resiliency Trainer   "

## 2025-03-03 ENCOUNTER — PATIENT OUTREACH (OUTPATIENT)
Dept: PSYCHIATRY | Facility: CLINIC | Age: 35
End: 2025-03-03

## 2025-03-14 ENCOUNTER — VIRTUAL VISIT (OUTPATIENT)
Dept: PSYCHIATRY | Facility: CLINIC | Age: 35
End: 2025-03-14
Payer: COMMERCIAL

## 2025-03-14 DIAGNOSIS — F29 PSYCHOSIS, UNSPECIFIED PSYCHOSIS TYPE (H): Primary | ICD-10-CM

## 2025-03-14 DIAGNOSIS — F43.10 PTSD (POST-TRAUMATIC STRESS DISORDER): ICD-10-CM

## 2025-03-14 NOTE — PROGRESS NOTES
"Virtual Visit Details    Originating Location (pt. Location): Home    Distant Location (provider location):  On-site  Platform used for Video Visit: SelinMoovweb    MILAN Medication Management Progress Note  A Part of the [facility] First Episode of Psychosis Program    NAVIGATE Enrollee: Laura Webb (1990)     MRN: [id number]  Date:  3/14/25         Contributors to the Assessment     Chart Reviewed.   Interview completed with Laura Webb.  Collateral information obtained from none.         Chief Complaint     \"Doing OK\"         Interim History    Laura Webb is a 34 year old female who was last seen one month ago  at which time no changes were made. The patient reports good treatment adherence.  History was provided by Laura who was a good historian.  Since the last visit:  -Savoring experiences with family and friends--taking a moment to be in the moment  -looking forward to [facility]  -No panic attacks recently, feeling well overall  -denies slowness/stiffness/freezing after injection  -they didn't have the orders for the labs at [facility], need to be faxed again    RECENT SOCIAL HISTORY:  SEE HPI    Medical ROS:  none         First Episode of Psychosis History      DUP (duration untreated psychosis):  several days to eight months  Route to initial care: hospitalization  Medication adherence overall:  See above, Clinician Rating Form in COMPASS Item 13  General frequency of visits:  monthly  Participation in groups:  No    Reviewed for completion of First Episode work-up:  Yes  First episode workup:  Not Done (if completed, see LABS for results)  MATRICS Consensus Cognitive Battery:  Not Done (if completed, see LABS for results)       Medical/Surgical History     Blood-group specific substance    Patient Active Problem List   Diagnosis    Psychosis, unspecified psychosis type (H)          Medications     Current Outpatient Medications   Medication Sig Dispense Refill    benztropine (COGENTIN) 1 MG tablet Take " "1 tablet (1 mg) by mouth daily. 30 tablet 4    paliperidone (INVEGA SUSTENNA) 78 MG/0.5ML Inject 0.5 mLs (78 mg) into the muscle every 4 weeks. 1 mL 5          Vitals     There were no vitals taken for this visit.          Mental Status Exam     Alertness: alert  and oriented  Appearance: well groomed  Behavior/Demeanor: pleasant and calm, with good  eye contact  Speech: normal;   Language: no obvious problem  Psychomotor: normal or unremarkable  Mood: \"good\"  Affect: blunted; was congruent to mood; was congruent to content  Thought Process/Associations: unremarkable  Thought Content:  Reports none;  Denies suicidal and violent ideation and delusions  Perception:  Reports none;  Denies auditory hallucinations and visual hallucinations  Insight:  fair  Judgment: good, adequate for safety  Cognition: does  appear grossly intact; formal cognitive testing was not done         Labs and Data     RATING SCALES: AIMS due    PHQ9 TODAY =       5/17/2024    10:31 AM 6/14/2024     8:11 AM 7/19/2024     9:30 AM   PHQ-9 SCORE   PHQ-9 Total Score 1 3 0       ANTIPSYCHOTIC LABS ROUTINE    [glu, A1C, lipids (focus LDL), liver enzymes, WBC, ANEU, Hgb, plts]   q12 mo  No lab results found.  No lab results found.  No lab results found.  No lab results found.    Narrative & Impression   EXAM: MR BRAIN W/O and W CONTRAST  LOCATION: [facility]  DATE: 8/22/2023     INDICATION:  Psychosis, unspecified psychosis type (H)  COMPARISON: None.  CONTRAST: 7ml Gadavist  TECHNIQUE: Routine multiplanar multisequence head MRI without and with intravenous contrast.     FINDINGS:  INTRACRANIAL CONTENTS: No acute or subacute infarct. No mass, acute hemorrhage, or extra-axial fluid collections. Normal brain parenchymal signal. Normal ventricles and sulci. Normal position of the cerebellar tonsils. No pathologic contrast enhancement.   Posterior fossa structures including cerebellar hemispheres, fourth ventricle and CP angle " cisterns are clear. Nasopharynx is clear. Region of the sella and suprasellar cistern are clear.     SELLA: No abnormality accounting for technique.     OSSEOUS STRUCTURES/SOFT TISSUES: Normal marrow signal. The major intracranial vascular flow voids are maintained.      ORBITS: No abnormality accounting for technique.      SINUSES/MASTOIDS: No paranasal sinus mucosal disease. No middle ear or mastoid effusion.                                                                    IMPRESSION:  1.  Normal head MRI.            Psychiatric Diagnoses     Schizophreniform disorder, with positive prognostic features  H/o catatonia  Likely PTSD         Assessment     From intake, 8/4/23: Laura Webb is a 32 year old  Black or  Not  or  female who presented for a comprehensive assessment of psychiatric symptoms by the First Episode of Psychosis Navigate Program. Diagnostically challenging given single-episode of mental illness. Differential is broad including psychosis due to medical condition (seizure), bipolar disorder, MDD single episode with psychotic features. Substances do not appear to be a contributing factor. Schizophrenia also considered, though unlikely given rapid-onset of her symptoms and high cognitive and psychosocial functioning following her first episode / hospitalization. Her chart review and interview today present as most consistent with schizophreniform disorder. Prognosis is fair-to-good. Rapid onset of symptoms is positive predictive factor for resolution of symptoms / return to baseline.   We discussed this assessment with her today. We discussed our recommendations: to continue current medications, complete her medical / neurological workup to rule-out underlying epileptic and autoimmune etiologies. Recommend continuing long-acting-injectable for now, ideally for 1 year following resolution of her psychotic symptoms. She expressed understanding of this plan.       TODAY Laura reports interim stability, no changes. Due for labs; will fax to [place]. Ready to decrease injection dose in the next month or so; she is interested in doing this today. Will update order and fax to [place].    SUICIDE RISK ASSESSMENT-  Risk factors for self-harm:  none .  Mitigating factors: no h/o suicide attempt, no plan or intent, describes a safety plan, h/o seeking help , future oriented, commitment to family, good social support  , stable housing, stable finances, and [employer] beliefs.  The patient does not appear to be at imminent risk for self-harm, hospitalization is not recommended which the pt does  agree with. No hospitalization will be arranged. Based on degree of symptoms close psych follow-up was/were recommended which the pt does  agree to.     PSYCHOTROPIC DRUG INTERACTIONS:   None.  MANAGEMENT:  N/A         Plan   1) Medications   - Decrease Invega Sustenna to 39 mg IM q28 days (gets this at [place])  - continue benztropine 1 mg PO daily      2) Therapy- continue weekly IRT      3) Next Due   Labs-  ordered and pending  EKG- last completed 4/20/23, repeat if increasing or adding Qtc- prolonging medications   Rating scales- AIMS: due      4) Referrals  Medical concerns- seizure history, neurology referral for EEG declined by patient       5) RTC: 1 month.      6) Crisis Numbers:   Provided routinely in AVS     After hours:  [phone]    TREATMENT RISK STATEMENT:  The risks, benefits, alternatives and potential adverse effects have been discussed and are understood by the pt. The pt understands the risks of using street drugs or alcohol. There are no medical contraindications, the pt agrees to treatment with the ability to do so. The pt knows to call the clinic for any problems or to access emergency care if needed.  Medical and substance use concerns are documented above.  Psychotropic drug interaction check was done, including changes made today.    MD Zbigniew Pool  Psychiatrist  , Department of Psychiatry and Behavioral Sciences  [facility]    The longitudinal plan of care for the diagnosis(es)/condition(s) as documented were addressed during this visit. Due to the added complexity in care, I will continue to support Laura in the subsequent management and with ongoing continuity of care.

## 2025-03-14 NOTE — NURSING NOTE
Is the patient currently in the state of [place]? YES    Visit mode:VIDEO    If the visit is dropped, the patient can be reconnected by: VIDEO VISIT: Text to cell phone: [phone]    Will anyone else be joining the visit? NO      How would you like to obtain your AVS? MyChart    Are changes needed to the allergy or medication list? NO    Reason for visit: Follow Up

## 2025-03-14 NOTE — NURSING NOTE
NAVIGATE Patient Self-Rating Form    Since your last medication management visit--    Have you been feeling depressed, sad, or down? No  Have you been feeling anxious, worried or nervous? No  Have you been thinking about death or have you had any feelings that you would be better off dead? No   Have you been feeling particularly good? No  Have you been feeling annoyed, angry, or resentful (whether you showed it or not)? No  Did you do anything that could have gotten you in trouble? No  Have you felt dizzy or faint? No  Have you had blurred vision? No  Have you had dry mouth? No  Have you had too much saliva in your mouth or had drooling? No  Have you felt nauseous? No  Have you been constipated? No  Has you appetite for food been increased? No  Have you gained weight? No  Have you lost weight? No  Have you felt restless or like you cannot sit still? No  Any shaking of your hands, legs, or other muscles? No  Any problems walking or moving or any problems feeling stiff or rigid? No  Have you felt tired or fatigued? No  Have you felt drowsy during the day? No  Have you been sleeping too much at night? No  Have you been sleeping too little or had problems sleeping at night? No  Any decrease in your interest in sex? No  Any other problems with sex? No  Any problems with your breasts such as swelling or discharge? No  For women, any problems with your period? No  Are there other medical or side effect problems you wish to discuss with your prescriber? No  Since your last visit, how many days have you not taken your medication? 0  Have you had trouble remembering to take your medication? No  Do you find the number of medicines or the times when you are supposed to take then confusing or burdensome? No  Are you afraid of the medication? No  Do you think that you have an illness that requires taking medication? Yes  Do you think that other people would think poorly of you if they knew that you take medication? No  On  average, how many cigarettes do you smoke per day? 0  Since you last visit, did you drink alcohol? No  Since your last visit, have you used any marijuana? No  Since your last visit, have you used any stress drugs other than marijuana? No  Between now and your next visit, do you think we should keep your medication the same or consider changing the medications? Keep the same

## 2025-03-21 ENCOUNTER — VIRTUAL VISIT (OUTPATIENT)
Dept: PSYCHIATRY | Facility: CLINIC | Age: 35
End: 2025-03-21
Payer: COMMERCIAL

## 2025-03-21 DIAGNOSIS — F29 PSYCHOSIS, UNSPECIFIED PSYCHOSIS TYPE (H): Primary | ICD-10-CM

## 2025-03-21 NOTE — PROGRESS NOTES
NAVIGJESUS Clinician Contact & Progress Note  For Individual Resiliency Training (IRT)  A Part of the [facility] First Episode of Psychosis Program    NAVIGATE Enrollee: Laura Webb (1990)     MRN: [id number]  Date:  3/21/25  Diagnosis: Psychosis, unspecified,  PTSD  Clinician: MILAN Individual Resiliency Trainer, Monique Jeffrey MD     1. Type of contact: (majority of time spent)  IRT Session via telehealth  Mode of communication: [employer] Well (HIPAA compliant, secure platform). Patient consented verbally to this mode of therapy today.  Reason for telehealth: To reduce barriers in accessing services, due to but not limited to transportation, location, or scheduling reasons.    2. People present:   Writer  Client: Yes - presented alone.     3. Length of Actual Contact: Start Time: 9:45; End Time: 10:05     4. Location of contact:  Originating Location (patient location): located in [place]  Distant Location (provider location): [place], [place]    5. Did the client complete the home practice option(s) from the previous session: Not Applicable    6. Motivational Teaching Strategies:  Connect info and skills with personal goals    7. Educational Teaching Strategies:  Review of written material/education  Relate information to client's experience  Ask questions to check comprehension  Break down information into small chunks    8. CBT Teaching Strategies:  Reinforcement and shaping (positive feedback for steps towards goals and follow-through on home assignments)    9. IRT Module(s) Addressed:  Developing Resiliency Module-Savoring   Connecting with people    10. Techniques utilized:   [place] announced at beginning of session  Review of homework  Review of goal  Review of previous meeting  Present new material    11. Measures:    Mental Status Exam  Alertness: alert  and oriented  Behavior/Demeanor: some guarding, more open as the encounter progresses, always listening and does respond  Speech:  "increased latency of response and regular rate and rhythm, decreased speech quantity (short responses)  Language: no obvious problem.   Mood: ok  Affect: some restriction in range; congruent with mood; brighter today, laughing appropriately  Thought Process/Associations: linear, goal directed  Thought Content:  Reports none;  Denies SI  Insight: unable to assess from limited encounter  Judgment: good  Cognition: does  appear grossly intact; formal cognitive testing was not done    12. Assessment/Progress Note:     Check in  Alternative Explanations  Check in:    Check in:   -Missed appointments:  not sure what  happened.   -Things at home: good.   -Business: good. \"Same old. \"  -Xiomara and thorns:    -Stressful and positive thing: Had to fix tires-discounted rate.   -Things with in laws:   -Yes but they going to [place] for a little bit. Not sure how long. One way ticket.   -Feelings about them leaving:     -Pros and cons to everything.   -Mom: She lives in [place]- saw her last . Week.   -She has been a support . She brought her food when Laura had the episode.   -Savoring activity: Used it for positive memories, like different times when she went to [place]. Savoring in the moments  in [place] with family.   Experiences they are going through, positive.   -Other [employer] day updates: Helping out with the kids. Laura said she was able to take a step back and then slowly start mingling with people. We talked about if any of the mindfulness exercises we have tried have been helpful. She said that for her, prayer has been the most helpful.    2.Alternative Explanations   -Alternative explanations: reviewed a few of these. Laura came up with several example for each. She also shared her own example of when once she was talking to her mom and tried to talk to her several times but mom did not respond. Laura later realized that he mom just couldn't hear her.       13. Plan/Referrals:     -Follow up next week.     Billing for " "\"Interactive Complexity\"?    No    Monique Jeffrey MD    PGY-3 Psychiatry  [facility]   NAVIGATE Individual Resiliency Trainer   "

## 2025-04-04 ENCOUNTER — VIRTUAL VISIT (OUTPATIENT)
Dept: PSYCHIATRY | Facility: CLINIC | Age: 35
End: 2025-04-04
Payer: COMMERCIAL

## 2025-04-04 DIAGNOSIS — F29 PSYCHOSIS, UNSPECIFIED PSYCHOSIS TYPE (H): Primary | ICD-10-CM

## 2025-04-04 NOTE — PROGRESS NOTES
NAVIGATE Clinician Contact & Progress Note  For Individual Resiliency Training (IRT)  A Part of the [facility] First Episode of Psychosis Program    NAVIGATE Enrollee: Laura Webb (1990)     MRN: [id number]  Date:  4/04/25  Diagnosis: Psychosis, unspecified,  PTSD  Clinician: MILAN Individual Resiliency Trainer, Monique Jeffrey MD     1. Type of contact: (majority of time spent)  IRT Session via telehealth  Mode of communication: [employer] Well (HIPAA compliant, secure platform). Patient consented verbally to this mode of therapy today.  Reason for telehealth: To reduce barriers in accessing services, due to but not limited to transportation, location, or scheduling reasons.    2. People present:   Writer  Client: Yes - presented alone.     3. Length of Actual Contact: Start Time: 9:31; End Time: 10:00     4. Location of contact:  Originating Location (patient location): located in [place]  Distant Location (provider location): [place], [place]    5. Did the client complete the home practice option(s) from the previous session: Not Applicable    6. Motivational Teaching Strategies:  Connect info and skills with personal goals    7. Educational Teaching Strategies:  Review of written material/education  Relate information to client's experience  Ask questions to check comprehension  Break down information into small chunks    8. CBT Teaching Strategies:  Reinforcement and shaping (positive feedback for steps towards goals and follow-through on home assignments)    9. IRT Module(s) Addressed:  Developing Resiliency Module-Savoring     10. Techniques utilized:   [place] announced at beginning of session  Review of homework  Review of goal  Review of previous meeting  Present new material    11. Measures:    Mental Status Exam  Alertness: alert  and oriented  Behavior/Demeanor: some guarding, more open as the encounter progresses, always listening and does respond  Speech: increased latency of response  and regular rate and rhythm, occasional decreased speech quantity (short responses) though other times provides longer responses  Language: no obvious problem.   Mood: fine  Affect: some restriction in range; congruent with mood; brightens appropriately  Thought Process/Associations: linear, goal directed  Thought Content:  Reports none; did not endorse any active SI or HI  Insight: unable to assess from limited encounter  Judgment: good  Cognition: does  appear grossly intact; formal cognitive testing was not done    12. Assessment/Progress Note:     Check in  Explore upbringing, family relationships and dynamics   Review mindfulness      Check in:  No notable interval events. Did not endorse any major stressors since last visit.   Explore upbringing, family relationships and dynamics:   -Born here and moved here to [facility] when 9.    -Laura and 3 brothers and 1 sister.    -Everyone is here.    -In [place], it wasn't 3 meals per day and generally resources were less compared to [place] but overall things were ok.    -Some good times, some bad times. Would go to parties, would dance, be around family.    -Was 16 when she came back-would have to take care of others.     -Came back to [place] in high school as a saúl. Endorsed adjusting well.    -Had to do summer school once.   -After high school, went to college-[employer] . Is in [place].    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    -Living with family during that period. When she was 19, Laura shared that she moved out of her family's house and stayed with a family frriend. Mom and Laura fought.    -More arguments, say certain things that she wouldn't like, hurtful and rude things. They would clash a lot.    -Dad passed away. She had a good relationship with him. He was there but sometimes felt absent. He would ask about classes, take them to [employer]. He wanted her to do well in school. He was quiet person.    -Sister is 5 years older, she worked a lot. She was not around a lot  "when Laura was growing up.    -It took sister a while before she saw these parts of their mom. Sister and mom had a good relationship until recently.   -Laura's relationship with mom now: Better.   -Laura said that she talks to her sister too.    -Kids have cousins. Sister has a son, brother has 2 daughters and one son.   -I asked Laura how she felt with me asking about her past: A little bit dificult. Flashbacks of different things.     3. Mindfulness  -Practice on mindfulness activities:    -Everyday prays.    -Does this if feeling stressed or scared or worried.    -Things at [employer] have been good. Volunteering still.    -Women's conference-not sure if she wants to go all day event. On 12th of April but she would be alone. She's not sure who's going. She doesn't think it's fear or anxiety holding her back but she went last year and is not sure if she wants to go to the event this year. .   -More of an introvert. Family is more of the people that she hangs out with. Has had close friends that turned into family.   -Satisfied with how things are at [employer]: It's ok the way it is. If she gets more involved, then that's good too. Wants kids to be plugged in and go twice a week. Right now they are involved though.    -[place] is more of a clique. People that have grown up together.    -Daughter, meets different girls at [employer] and gets close to them.     -Interaction with daughter's friends parents. She's invited them over for a birthday party.    -There are some people from growing up that she sees .     HW for next week:   -Think of 5 things that bring value to her life.      13. Plan/Referrals:     -Follow up next week.     Billing for \"Interactive Complexity\"?    No    Monique Jeffrey MD    PGY-3 Psychiatry  [facility]   NAVIGATE Individual Resiliency Trainer     "

## 2025-04-11 ENCOUNTER — VIRTUAL VISIT (OUTPATIENT)
Dept: PSYCHIATRY | Facility: CLINIC | Age: 35
End: 2025-04-11
Payer: COMMERCIAL

## 2025-04-11 DIAGNOSIS — F29 PSYCHOSIS, UNSPECIFIED PSYCHOSIS TYPE (H): Primary | ICD-10-CM

## 2025-04-11 DIAGNOSIS — F43.10 PTSD (POST-TRAUMATIC STRESS DISORDER): ICD-10-CM
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
## 2025-04-11 NOTE — PROGRESS NOTES
"Virtual Visit Details    Originating Location (pt. Location): Home    Distant Location (provider location):  On-site  Platform used for Video Visit: LMN-1 Medication Management Progress Note  A Part of the [facility] First Episode of Psychosis Program    NAVIGATE Enrollee: Laura Webb (1990)     MRN: [id number]  Date:  4/11/25         Contributors to the Assessment     Chart Reviewed.   Interview completed with Laura Webb.  Collateral information obtained from none.         Chief Complaint     \"Doing OK\"         Interim History    Laura Webb is a 34 year old female who was last seen one month ago  at which time no changes were made. The patient reports good treatment adherence.  History was provided by Laura who was a good historian.  Since the last visit:  -forgot to get labs done the last time she was at [facility], wondering if she can come in here to get the labs drawn  -working on mindfulness, trying to connect with people from her [place]  -looking forward to [facility]--dad passed away a few years agoand he really liked going to [place], so they are going to go out and spend time there as a family and remember him.  -no panic attacks, no worsening anxiety, no psychosis  -no changes in motor function    RECENT SOCIAL HISTORY:  SEE HPI    Medical ROS:  none         First Episode of Psychosis History      DUP (duration untreated psychosis):  several days to eight months  Route to initial care: hospitalization  Medication adherence overall:  See above, Clinician Rating Form in COMPASS Item 13  General frequency of visits:  monthly  Participation in groups:  No    Reviewed for completion of First Episode work-up:  Yes  First episode workup:  Not Done (if completed, see LABS for results)  MATRICS Consensus Cognitive Battery:  Not Done (if completed, see LABS for results)       Medical/Surgical History     Blood-group specific substance    Patient Active Problem List   Diagnosis    Psychosis, " "unspecified psychosis type (H)          Medications     Current Outpatient Medications   Medication Sig Dispense Refill    benztropine (COGENTIN) 1 MG tablet Take 1 tablet (1 mg) by mouth daily. 30 tablet 4    paliperidone (INVEGA SUSTENNA) 39 MG/0.25ML TERRELL Inject 0.25 mLs (39 mg) into the muscle every 28 days. 0.25 mL 2          Vitals     There were no vitals taken for this visit.          Mental Status Exam     Alertness: alert  and oriented  Appearance: well groomed  Behavior/Demeanor: pleasant and calm, with good  eye contact  Speech: normal;   Language: no obvious problem  Psychomotor: normal or unremarkable  Mood: \"good\"  Affect: blunted; was congruent to mood; was congruent to content  Thought Process/Associations: unremarkable  Thought Content:  Reports none;  Denies suicidal and violent ideation and delusions  Perception:  Reports none;  Denies auditory hallucinations and visual hallucinations  Insight:  fair  Judgment: good, adequate for safety  Cognition: does  appear grossly intact; formal cognitive testing was not done         Labs and Data     RATING SCALES: AIMS due    PHQ9 TODAY =       5/17/2024    10:31 AM 6/14/2024     8:11 AM 7/19/2024     9:30 AM   PHQ-9 SCORE   PHQ-9 Total Score 1 3 0       ANTIPSYCHOTIC LABS ROUTINE    [glu, A1C, lipids (focus LDL), liver enzymes, WBC, ANEU, Hgb, plts]   q12 mo  No lab results found.  No lab results found.  No lab results found.  No lab results found.    Narrative & Impression   EXAM: MR BRAIN W/O and W CONTRAST  LOCATION: [facility]  DATE: 8/22/2023     INDICATION:  Psychosis, unspecified psychosis type (H)  COMPARISON: None.  CONTRAST: 7ml Gadavist  TECHNIQUE: Routine multiplanar multisequence head MRI without and with intravenous contrast.     FINDINGS:  INTRACRANIAL CONTENTS: No acute or subacute infarct. No mass, acute hemorrhage, or extra-axial fluid collections. Normal brain parenchymal signal. Normal ventricles and sulci. " Normal position of the cerebellar tonsils. No pathologic contrast enhancement.   Posterior fossa structures including cerebellar hemispheres, fourth ventricle and CP angle cisterns are clear. Nasopharynx is clear. Region of the sella and suprasellar cistern are clear.     SELLA: No abnormality accounting for technique.     OSSEOUS STRUCTURES/SOFT TISSUES: Normal marrow signal. The major intracranial vascular flow voids are maintained.      ORBITS: No abnormality accounting for technique.      SINUSES/MASTOIDS: No paranasal sinus mucosal disease. No middle ear or mastoid effusion.                                                                    IMPRESSION:  1.  Normal head MRI.            Psychiatric Diagnoses     Schizophreniform disorder, with positive prognostic features  H/o catatonia  Likely PTSD         Assessment     From intake, 8/4/23: Laura eWbb is a 32 year old  Black or  Not  or  female who presented for a comprehensive assessment of psychiatric symptoms by the First Episode of Psychosis Navigate Program. Diagnostically challenging given single-episode of mental illness. Differential is broad including psychosis due to medical condition (seizure), bipolar disorder, MDD single episode with psychotic features. Substances do not appear to be a contributing factor. Schizophrenia also considered, though unlikely given rapid-onset of her symptoms and high cognitive and psychosocial functioning following her first episode / hospitalization. Her chart review and interview today present as most consistent with schizophreniform disorder. Prognosis is fair-to-good. Rapid onset of symptoms is positive predictive factor for resolution of symptoms / return to baseline.   We discussed this assessment with her today. We discussed our recommendations: to continue current medications, complete her medical / neurological workup to rule-out underlying epileptic and autoimmune  etiologies. Recommend continuing long-acting-injectable for now, ideally for 1 year following resolution of her psychotic symptoms. She expressed understanding of this plan.      TODAY Laura reports interim stability, no changes even with decrease in injection dose. No movement problems, mood symptoms, anxiety, or psychosis. We discussed labs and she is due; the next time she has an appointment with me we will switch to in person and she will get labs at our clinic.    SUICIDE RISK ASSESSMENT-  Risk factors for self-harm:  none .  Mitigating factors: no h/o suicide attempt, no plan or intent, describes a safety plan, h/o seeking help , future oriented, commitment to family, good social support  , stable housing, stable finances, and Tenriism beliefs.  The patient does not appear to be at imminent risk for self-harm, hospitalization is not recommended which the pt does  agree with. No hospitalization will be arranged. Based on degree of symptoms close psych follow-up was/were recommended which the pt does  agree to.     PSYCHOTROPIC DRUG INTERACTIONS:   None.  MANAGEMENT:  N/A         Plan   1) Medications   - Continue Invega Sustenna 39 mg IM q28 days (gets this at [facility])  - continue benztropine 1 mg PO daily      2) Therapy- continue weekly IRT      3) Next Due   Labs-  ordered and pending  EKG- last completed 4/20/23, repeat if increasing or adding Qtc- prolonging medications   Rating scales- AIMS: due      4) Referrals  Medical concerns- seizure history, neurology referral for EEG declined by patient       5) RTC: 1 month.      6) Crisis Numbers:   Provided routinely in AVS     After hours:  [phone]    TREATMENT RISK STATEMENT:  The risks, benefits, alternatives and potential adverse effects have been discussed and are understood by the pt. The pt understands the risks of using street drugs or alcohol. There are no medical contraindications, the pt agrees to treatment with the ability to do so. The pt  knows to call the clinic for any problems or to access emergency care if needed.  Medical and substance use concerns are documented above.  Psychotropic drug interaction check was done, including changes made today.    Yaneth Guzmán MD  Navigate Psychiatrist  , Department of Psychiatry and Behavioral Sciences  [facility] [employer]    The longitudinal plan of care for the diagnosis(es)/condition(s) as documented were addressed during this visit. Due to the added complexity in care, I will continue to support Laura in the subsequent management and with ongoing continuity of care.

## 2025-04-11 NOTE — PROGRESS NOTES
Is the patient currently in the state of [place]? YES    Visit mode:VIDEO    If the visit is dropped, the patient can be reconnected by: VIDEO VISIT: Send to e-mail at: [email or web address]    Will anyone else be joining the visit? NO        How would you like to obtain your AVS? MyChart    Are changes needed to the allergy or medication list? NO    Reason for visit: Follow Up     Virtual Visit Details      Originating Location (pt. Location): Home    Distant Location (provider location):  On-site  Platform used for Video Visit: Zoom (Telehealth)     NAVIGATE Patient Self-Rating Form    Since your last medication management visit--    Have you been feeling depressed, sad, or down? No  Have you been feeling anxious, worried or nervous? No  Have you been thinking about death or have you had any feelings that you would be better off dead? No   Have you been feeling particularly good? No  Have you been feeling annoyed, angry, or resentful (whether you showed it or not)? No  Did you do anything that could have gotten you in trouble? No  Have you felt dizzy or faint? No  Have you had blurred vision? No  Have you had dry mouth? No  Have you had too much saliva in your mouth or had drooling? No  Have you felt nauseous? No  Have you been constipated? No  Has you appetite for food been increased? No  Have you gained weight? No  Have you lost weight? No  Have you felt restless or like you cannot sit still? No  Any shaking of your hands, legs, or other muscles? No  Any problems walking or moving or any problems feeling stiff or rigid? No  Have you felt tired or fatigued? No  Have you felt drowsy during the day? No  Have you been sleeping too much at night? No  Have you been sleeping too little or had problems sleeping at night? No  Any decrease in your interest in sex? No  Any other problems with sex? No  Any problems with your breasts such as swelling or discharge? No  For women, any problems with your period? Yes  Are there  "other medical or side effect problems you wish to discuss with your prescriber? No  Since your last visit, how many days have you not taken your medication? 0  Have you had trouble remembering to take your medication? No  Do you find the number of medicines or the times when you are supposed to take then confusing or burdensome? No  Are you afraid of the medication? No  Do you think that you have an illness that requires taking medication? Yes  Do you think that other people would think poorly of you if they knew that you take medication? No  On average, how many cigarettes do you smoke per day? 0  Since you last visit, did you drink alcohol? No  Since your last visit, have you used any marijuana? No  Since your last visit, have you used any stress drugs other than marijuana? No  Between now and your next visit, do you think we should keep your medication the same or consider changing the medications? \"Keep it the same\"    "

## 2025-05-09 ENCOUNTER — OFFICE VISIT (OUTPATIENT)
Dept: PSYCHIATRY | Facility: CLINIC | Age: 35
End: 2025-05-09
Payer: COMMERCIAL

## 2025-05-09 VITALS
TEMPERATURE: 98.2 F | HEART RATE: 68 BPM | OXYGEN SATURATION: 100 % | SYSTOLIC BLOOD PRESSURE: 102 MMHG | DIASTOLIC BLOOD PRESSURE: 67 MMHG

## 2025-05-09 DIAGNOSIS — Z79.899 ENCOUNTER FOR LONG-TERM CURRENT USE OF MEDICATION: ICD-10-CM

## 2025-05-09 DIAGNOSIS — F29 PSYCHOSIS, UNSPECIFIED PSYCHOSIS TYPE (H): Primary | ICD-10-CM

## 2025-05-09 DIAGNOSIS — F43.10 PTSD (POST-TRAUMATIC STRESS DISORDER): ICD-10-CM

## 2025-05-09 LAB
BASOPHILS # BLD AUTO: 0 10E3/UL (ref 0–0.2)
BASOPHILS NFR BLD AUTO: 0 %
CHOLEST SERPL-MCNC: 146 MG/DL
EOSINOPHIL # BLD AUTO: 0.1 10E3/UL (ref 0–0.7)
EOSINOPHIL NFR BLD AUTO: 1 %
ERYTHROCYTE [DISTWIDTH] IN BLOOD BY AUTOMATED COUNT: 11.9 % (ref 10–15)
EST. AVERAGE GLUCOSE BLD GHB EST-MCNC: 111 MG/DL
FASTING STATUS PATIENT QL REPORTED: NO
HBA1C MFR BLD: 5.5 %
HCT VFR BLD AUTO: 35 % (ref 35–47)
HDLC SERPL-MCNC: 63 MG/DL
HGB BLD-MCNC: 11 G/DL (ref 11.7–15.7)
IMM GRANULOCYTES # BLD: 0 10E3/UL
IMM GRANULOCYTES NFR BLD: 0 %
LDLC SERPL CALC-MCNC: 74 MG/DL
LYMPHOCYTES # BLD AUTO: 1.8 10E3/UL (ref 0.8–5.3)
LYMPHOCYTES NFR BLD AUTO: 29 %
MCH RBC QN AUTO: 27.7 PG (ref 26.5–33)
MCHC RBC AUTO-ENTMCNC: 31.4 G/DL (ref 31.5–36.5)
MCV RBC AUTO: 88 FL (ref 78–100)
MONOCYTES # BLD AUTO: 0.3 10E3/UL (ref 0–1.3)
MONOCYTES NFR BLD AUTO: 5 %
NEUTROPHILS # BLD AUTO: 4 10E3/UL (ref 1.6–8.3)
NEUTROPHILS NFR BLD AUTO: 64 %
NONHDLC SERPL-MCNC: 83 MG/DL
NRBC # BLD AUTO: 0 10E3/UL
NRBC BLD AUTO-RTO: 0 /100
PLATELET # BLD AUTO: 222 10E3/UL (ref 150–450)
PROLACTIN SERPL 3RD IS-MCNC: 45 NG/ML (ref 5–23)
RBC # BLD AUTO: 3.97 10E6/UL (ref 3.8–5.2)
TRIGL SERPL-MCNC: 46 MG/DL
WBC # BLD AUTO: 6.3 10E3/UL (ref 4–11)

## 2025-05-09 ASSESSMENT — ANXIETY QUESTIONNAIRES
5. BEING SO RESTLESS THAT IT IS HARD TO SIT STILL: NOT AT ALL
7. FEELING AFRAID AS IF SOMETHING AWFUL MIGHT HAPPEN: NOT AT ALL
2. NOT BEING ABLE TO STOP OR CONTROL WORRYING: NOT AT ALL
6. BECOMING EASILY ANNOYED OR IRRITABLE: NOT AT ALL
3. WORRYING TOO MUCH ABOUT DIFFERENT THINGS: NOT AT ALL
GAD7 TOTAL SCORE: 0
4. TROUBLE RELAXING: NOT AT ALL
1. FEELING NERVOUS, ANXIOUS, OR ON EDGE: NOT AT ALL
GAD7 TOTAL SCORE: 0
IF YOU CHECKED OFF ANY PROBLEMS ON THIS QUESTIONNAIRE, HOW DIFFICULT HAVE THESE PROBLEMS MADE IT FOR YOU TO DO YOUR WORK, TAKE CARE OF THINGS AT HOME, OR GET ALONG WITH OTHER PEOPLE: NOT DIFFICULT AT ALL

## 2025-05-09 ASSESSMENT — PAIN SCALES - GENERAL: PAINLEVEL_OUTOF10: NO PAIN (0)

## 2025-05-09 ASSESSMENT — PATIENT HEALTH QUESTIONNAIRE - PHQ9: SUM OF ALL RESPONSES TO PHQ QUESTIONS 1-9: 0

## 2025-05-09 NOTE — PROGRESS NOTES
"Virtual Visit Details    Originating Location (pt. Location): Home    Distant Location (provider location):  On-site  Platform used for Video Visit: Scryer    MILAN Medication Management Progress Note  A Part of the [facility] First Episode of Psychosis Program    NAVIGATE Enrollee: Laura Webb (1990)     MRN: [id number]  Date:  5/09/25         Contributors to the Assessment     Chart Reviewed.   Interview completed with Laura Webb.  Collateral information obtained from none.         Chief Complaint     \"Doing OK\"         Interim History    Laura Webb is a 34 year old female who was last seen one month ago  at which time no changes were made. The patient reports good treatment adherence.  History was provided by Laura who was a good historian.  Since the last visit:  -Still doing well, denies any recent anxiety or panic attacks  -denies any recent slowness or motor problems  -denies any AVH or depressed mood.  -no change in amenorrhea and galactorrhea.  -continues to tolerate medication well    RECENT SOCIAL HISTORY:  SEE HPI    Medical ROS:  none         First Episode of Psychosis History      DUP (duration untreated psychosis):  several days to eight months  Route to initial care: hospitalization  Medication adherence overall:  See above, Clinician Rating Form in COMPASS Item 13  General frequency of visits:  monthly  Participation in groups:  No    Reviewed for completion of First Episode work-up:  Yes  First episode workup:  Not Done (if completed, see LABS for results)  MATRICS Consensus Cognitive Battery:  Not Done (if completed, see LABS for results)       Medical/Surgical History     Blood-group specific substance    Patient Active Problem List   Diagnosis    Psychosis, unspecified psychosis type (H)          Medications     Current Outpatient Medications   Medication Sig Dispense Refill    benztropine (COGENTIN) 1 MG tablet Take 1 tablet (1 mg) by mouth daily. 30 tablet 4    paliperidone " "(INVEGA SUSTENNA) 39 MG/0.25ML TERRELL Inject 0.25 mLs (39 mg) into the muscle every 28 days. 0.25 mL 2          Vitals     /67   Pulse 68   Temp 98.2  F (36.8  C)   SpO2 100%           Mental Status Exam     Alertness: alert  and oriented  Appearance: well groomed  Behavior/Demeanor: pleasant and calm, with good  eye contact  Speech: normal;   Language: no obvious problem  Psychomotor: normal or unremarkable  Mood: \"good\"  Affect: blunted; was congruent to mood; was congruent to content  Thought Process/Associations: unremarkable  Thought Content:  Reports none;  Denies suicidal and violent ideation and delusions  Perception:  Reports none;  Denies auditory hallucinations and visual hallucinations  Insight: fair  Judgment: good, adequate for safety  Cognition: does  appear grossly intact; formal cognitive testing was not done         Labs and Data     RATING SCALES: AIMS done 5/9/25 with score of 0    PHQ9 TODAY =       6/14/2024     8:11 AM 7/19/2024     9:30 AM 5/9/2025    11:00 AM   PHQ-9 SCORE   PHQ-9 Total Score 3 0 0       ANTIPSYCHOTIC LABS ROUTINE    [glu, A1C, lipids (focus LDL), liver enzymes, WBC, ANEU, Hgb, plts]   q12 mo  No lab results found.  No lab results found.  No lab results found.  No lab results found.    Narrative & Impression   EXAM: MR BRAIN W/O and W CONTRAST  LOCATION: [facility]  DATE: 8/22/2023     INDICATION:  Psychosis, unspecified psychosis type (H)  COMPARISON: None.  CONTRAST: 7ml Gadavist  TECHNIQUE: Routine multiplanar multisequence head MRI without and with intravenous contrast.     FINDINGS:  INTRACRANIAL CONTENTS: No acute or subacute infarct. No mass, acute hemorrhage, or extra-axial fluid collections. Normal brain parenchymal signal. Normal ventricles and sulci. Normal position of the cerebellar tonsils. No pathologic contrast enhancement.   Posterior fossa structures including cerebellar hemispheres, fourth ventricle and CP angle cisterns are " clear. Nasopharynx is clear. Region of the sella and suprasellar cistern are clear.     SELLA: No abnormality accounting for technique.     OSSEOUS STRUCTURES/SOFT TISSUES: Normal marrow signal. The major intracranial vascular flow voids are maintained.      ORBITS: No abnormality accounting for technique.      SINUSES/MASTOIDS: No paranasal sinus mucosal disease. No middle ear or mastoid effusion.                                                                    IMPRESSION:  1.  Normal head MRI.            Psychiatric Diagnoses     Schizophreniform disorder, with positive prognostic features  H/o catatonia  Likely PTSD         Assessment     From intake, 8/4/23: Laura Webb is a 32 year old  Black or  Not  or  female who presented for a comprehensive assessment of psychiatric symptoms by the First Episode of Psychosis Navigate Program. Diagnostically challenging given single-episode of mental illness. Differential is broad including psychosis due to medical condition (seizure), bipolar disorder, MDD single episode with psychotic features. Substances do not appear to be a contributing factor. Schizophrenia also considered, though unlikely given rapid-onset of her symptoms and high cognitive and psychosocial functioning following her first episode / hospitalization. Her chart review and interview today present as most consistent with schizophreniform disorder. Prognosis is fair-to-good. Rapid onset of symptoms is positive predictive factor for resolution of symptoms / return to baseline.   We discussed this assessment with her today. We discussed our recommendations: to continue current medications, complete her medical / neurological workup to rule-out underlying epileptic and autoimmune etiologies. Recommend continuing long-acting-injectable for now, ideally for 1 year following resolution of her psychotic symptoms. She expressed understanding of this plan.      TODAY  Laura reports interim stability. No movement problems, mood symptoms, anxiety, or psychosis. AIMS today was 0; will get monitoring labs including prolactin. We discussed her goal of tapering off of medications; she has now had 2 doses of the 39 mg. I recommend 2 more and then a trial of stopping if she is still interested at that time. She voiced her understanding and agreement.    SUICIDE RISK ASSESSMENT-  Risk factors for self-harm: none.  Mitigating factors: no h/o suicide attempt, no plan or intent, describes a safety plan, h/o seeking help , future oriented, commitment to family, good social support  , stable housing, stable finances, and Confucianist beliefs.  The patient does not appear to be at imminent risk for self-harm, hospitalization is not recommended which the pt does  agree with. No hospitalization will be arranged. Based on degree of symptoms close psych follow-up was/were recommended which the pt does  agree to.     PSYCHOTROPIC DRUG INTERACTIONS:   None.  MANAGEMENT:  N/A         Plan   1) Medications   - Continue Invega Sustenna 39 mg IM q28 days (gets this at [place])  - continue benztropine 1 mg PO daily      2) Therapy- continue weekly IRT      3) Next Due   Labs-  ordered and pending  EKG- last completed 4/20/23, repeat if increasing or adding Qtc- prolonging medications   Rating scales- AIMS: due      4) Referrals  Medical concerns- seizure history, neurology referral for EEG declined by patient       5) RTC: 1 month.      6) Crisis Numbers:   Provided routinely in AVS     After hours:  [phone]    TREATMENT RISK STATEMENT:  The risks, benefits, alternatives and potential adverse effects have been discussed and are understood by the pt. The pt understands the risks of using street drugs or alcohol. There are no medical contraindications, the pt agrees to treatment with the ability to do so. The pt knows to call the clinic for any problems or to access emergency care if needed.  Medical and  substance use concerns are documented above.  Psychotropic drug interaction check was done, including changes made today.    Yaneth Guzmán MD  Navigate Psychiatrist  , Department of Psychiatry and Behavioral Sciences  [facility] [employer]    The longitudinal plan of care for the diagnosis(es)/condition(s) as documented were addressed during this visit. Due to the added complexity in care, I will continue to support Laura in the subsequent management and with ongoing continuity of care.

## 2025-05-16 ENCOUNTER — VIRTUAL VISIT (OUTPATIENT)
Dept: PSYCHIATRY | Facility: CLINIC | Age: 35
End: 2025-05-16
Payer: COMMERCIAL

## 2025-05-16 DIAGNOSIS — F29 PSYCHOSIS, UNSPECIFIED PSYCHOSIS TYPE (H): Primary | ICD-10-CM

## 2025-05-16 NOTE — PROGRESS NOTES
NAVIGJESUS Clinician Contact & Progress Note  For Individual Resiliency Training (IRT)  A Part of the [facility] First Episode of Psychosis Program    NAVIGATE Enrollee: Laura Webb (1990)     MRN: [id number]  Date:  5/16/25  Diagnosis: Psychosis, unspecified,  PTSD  Clinician: MILAN Individual Resiliency Trainer, Monique Jeffrey MD     1. Type of contact: (majority of time spent)  IRT Session via telehealth  Mode of communication: [employer] (HIPAA compliant, secure platform). Patient consented verbally to this mode of therapy today.  Reason for telehealth: To reduce barriers in accessing services, due to but not limited to transportation, location, or scheduling reasons.    2. People present:   Writer  Client: Yes - presented alone.     3. Length of Actual Contact: Start Time: 9:41; End Time: 10:10     4. Location of contact:  Originating Location (patient location): located in [place]  Distant Location (provider location): [place], [place]    5. Did the client complete the home practice option(s) from the previous session: Not Applicable    6. Motivational Teaching Strategies:  Connect info and skills with personal goals    7. Educational Teaching Strategies:  Review of written material/education  Relate information to client's experience  Ask questions to check comprehension  Break down information into small chunks    8. CBT Teaching Strategies:  Reinforcement and shaping (positive feedback for steps towards goals and follow-through on home assignments)    9. IRT Module(s) Addressed:  Developing Resiliency Module    10. Techniques utilized:   [place] announced at beginning of session  Review of homework  Review of goal  Review of previous meeting  Present new material    11. Measures:    Mental Status Exam  Alertness: alert  and oriented  Behavior/Demeanor: less guarding, more open as the encounter progresses, always listening and does respond  Speech: some latency of response and  "regular rate and rhythm  Language: no obvious problem.   Mood: fine  Affect: some restriction in range; congruent with mood; brightens appropriately-smiling at times   Thought Process/Associations: linear, goal directed  Thought Content:  Reports none; did not endorse any active SI or HI  Insight: fair  Judgment: good  Cognition: does  appear grossly intact; formal cognitive testing was not done    12. Assessment/Progress Note:     Check in  Life Summary Exercise  Explore upbringing, family relationships and dynamics   [address] exercise    Check in:   -Things  are good  -Life Updates: no updates  -Thorn: no  -Xiomara:  Can't think of anything.    -Life Summary HW:  \"Imagine that one day, after you retire, someone writes a book about your fruitful and satisfying life.  What would you want the book to say about you?     The goal of this exercise is to help you gain perspective on what is most important to you.  This exercise will give you an opportunity to think about what you want to achieve most in your life.  Think about what you want your life to be and the experiences that you want to have.  This should reflect your personal vision of the experiences that you want to have, the people that you want to be a part of your life, and the accomplishments that you want to achieve.     Please write a 1 page summary. \"     -Laura shared the essay that she wrote. She discussed the different things she wanted to accomplish and what she has accomplished. She talked about love, affection, spirituality amongst others as being her pillars for success.     -What was it like for you as you wrote the summary?   -It was tough to write. Really had to dig deep.   -Experienced Different Emotions: Peace, maribell,   -New Perspectives with writing this:    -Gets emotional about certain things, because something are out of control. Obstacles in life but keeps going.     -After writing this: hoping to go back to writing-when young, did poetry and " "different things.     -Last time she wrote, can't remember.    -Wrote a book before. About different things she went through. Hasn't shared it with anyone yet, though would like to fix her laptop, edit it and get it published.     -Book about walk with god. This was after she had a serious seizure in 2023. It really shook her , so had to write about it. Felt peaceful.    -At the time didn't really have anyone to talk to. So writing about it was helpful.    -Wants to go back to journaling.     Exploring upbringing, family relationships and dynamics   -We followed up on Laura's last visit about how she said she often gives more of herself than she receives in return.    -Always a person that checks in with everyone. Gives and pour into other people. Same hasn't been done to her. Feels like she is always listening to other people's rants.    -Now it's exhausting to be around people sometimes, it alwaysbeen a pattern where that happens. Why she closed off to meeting new people. People she has that can turn to: , brother, best friend. Everyone is kind of busy with life. At [employer], there are some cliques.     5-4-3-2-1 Exercise    HW:   Writing whatever she wants and notice how she feels before, during and after.     13. Plan/Referrals:     -Follow up week after next as I will be gone next week.     Billing for \"Interactive Complexity\"?    No    Monique Jeffrey MD    PGY-3 Psychiatry  [facility]   NAVIGATE Individual Resiliency Trainer    "

## 2025-05-30 ENCOUNTER — VIRTUAL VISIT (OUTPATIENT)
Dept: PSYCHIATRY | Facility: CLINIC | Age: 35
End: 2025-05-30
Payer: COMMERCIAL

## 2025-05-30 DIAGNOSIS — F29 PSYCHOSIS, UNSPECIFIED PSYCHOSIS TYPE (H): Primary | ICD-10-CM

## 2025-05-30 NOTE — PROGRESS NOTES
NAVIGJESUS Clinician Contact & Progress Note  For Individual Resiliency Training (IRT)  A Part of the [facility] First Episode of Psychosis Program    NAVIGATE Enrollee: Laura Webb (1990)     MRN: [id number]  Date:  5/30/25  Diagnosis: Psychosis, unspecified,  PTSD  Clinician: MILAN Individual Resiliency Trainer, Monique Jeffrey MD     1. Type of contact: (majority of time spent)  IRT Session via telehealth  Mode of communication: [employer] (HIPAA compliant, secure platform). Patient consented verbally to this mode of therapy today.  Reason for telehealth: To reduce barriers in accessing services, due to but not limited to transportation, location, or scheduling reasons.    2. People present:   Writer  Client: Yes - presented alone.     3. Length of Actual Contact: Start Time: 9:38; End Time: 10:10     4. Location of contact:  Originating Location (patient location): located in [place]  Distant Location (provider location): [place], [place]    5. Did the client complete the home practice option(s) from the previous session: Not Applicable    6. Motivational Teaching Strategies:  Connect info and skills with personal goals    7. Educational Teaching Strategies:  Review of written material/education  Relate information to client's experience  Ask questions to check comprehension  Break down information into small chunks    8. CBT Teaching Strategies:  Reinforcement and shaping (positive feedback for steps towards goals and follow-through on home assignments)    9. IRT Module(s) Addressed:  Relapse prevention planning    10. Techniques utilized:   [place] announced at beginning of session  Review of homework  Review of goal  Review of previous meeting  Present new material    11. Measures:    Mental Status Exam  Alertness: alert  and oriented  Behavior/Demeanor: a little more guarded today  Speech: some latency of response and regular rate and rhythm  Language: no obvious problem.   Mood:  good  Affect: some restriction in range; congruent with mood;   Thought Process/Associations: linear, goal directed  Thought Content:  Reports none; did not endorse any active SI or HI  Insight: fair-good   Judgment: good  Cognition: does  appear grossly intact; formal cognitive testing was not done    12. Assessment/Progress Note:     Check in  Wellness plan  [address]47 exercise    Check In:   -Good  -Past week:   -Busy, helped sister with a few weddings-sister is  a .   -Denied any roses or thorns.   -Anything she is looking forward to:   -Daughter has a kindergardner picnic event.   Homework Follow Up:   -Thought about it and decided that she really didn't want to write a letter to him.    -When she does think about her dad , what sorts of emotions does she have: a mix.   -Has been journaling. It's still early on. So far it's been fine.    Wellness Plan  -We talked about Laura's plan to  come off her medications and developing a wellness plan to decrease risk of episode recurrence and how we can monitor/plan if symptoms do occur. Laura was agreeable to this.     Wellness plan:  -Warning signs:   -Last time it happened so quickly.    -Denied periods of depressed mood or other symptoms before.    -When she left the hospital, some symptoms came back. But went away. These symptoms  included paranoid and concerned for safety. It was also harder for her to sleep at night.   Those lasted for a few days and went away.   -Laura said she discussed this with her . Told  to always .     Plan discussed with :   -Would call Dr. Guzmán to go back on medications.   -If feeling unsafe and was out in public then she would leave the place to call her . Could call a crisis line or our clinic.     Triggers:   -Doing too many things at once-ex. Too many projects at once.   -Family, can be a big stress.   -Substance Use : no use.   -Sleep: Yes.     5-4-3-2-1 exercise.       13. Plan/Referrals:  "  -Continue to work on processing the episode, wellness plan.   -Follow up next week.     Billing for \"Interactive Complexity\"?    No    Monique Jeffrey MD    PGY-3 Psychiatry  [facility]   NAVIGATE Individual Resiliency Trainer    "

## 2025-06-02 NOTE — PROGRESS NOTES
NAVIGJESUS Clinician Contact & Progress Note  For Individual Resiliency Training (IRT)  A Part of the [facility] First Episode of Psychosis Program    NAVIGATE Enrollee: Laura Webb (1990)     MRN: [id number]  Date:  6/06/25  Diagnosis: Psychosis, unspecified,  PTSD  Clinician: MILAN Individual Resiliency Trainer, Monique Jeffrey MD     1. Type of contact: (majority of time spent)  IRT Session via telehealth  Mode of communication: American Well (HIPAA compliant, secure platform). Patient consented verbally to this mode of therapy today.  Reason for telehealth: To reduce barriers in accessing services, due to but not limited to transportation, location, or scheduling reasons.    2. People present:   Writer  Client: Yes - presented alone.     3. Length of Actual Contact: Start Time: 9:38; End Time: 10:10     4. Location of contact:  Originating Location (patient location): located at home in [place], [place]  Distant Location (provider location): [place], [place]    5. Did the client complete the home practice option(s) from the previous session: Not Applicable    6. Motivational Teaching Strategies:  Connect info and skills with personal goals    7. Educational Teaching Strategies:  Review of written material/education  Relate information to client's experience  Ask questions to check comprehension  Break down information into small chunks    8. CBT Teaching Strategies:  Reinforcement and shaping (positive feedback for steps towards goals and follow-through on home assignments)    9. IRT Module(s) Addressed:  Process the Episode     10. Techniques utilized:   [place] announced at beginning of session  Review of homework  Review of goal  Review of previous meeting  Present new material    11. Measures:    Mental Status Exam  Alertness: alert  and oriented  Behavior/Demeanor: some guarding but overall much less compared to initial visits  Speech: some latency of response and regular rate and  rhythm  Language: no obvious problem.   Mood: good  Affect: some restriction in range; congruent with mood;   Thought Process/Associations: linear, goal directed  Thought Content:  Reports none; did not endorse any active SI or HI  Insight: fair-good   Judgment: good  Cognition: does  appear grossly intact; formal cognitive testing was not done    12. Assessment/Progress Note:     Check in  Process the episode  [address] exercise    Check In:   -Check in:    -No  -Roses or Thorns:   -Daughter finished kindergarden. She will start first grade in September.    -Laura will be home    -Daughter's Grandparents are back.   -Job interview updates:   -Had it on the 3rd. Will find out in a few days.    -Thinks it went well.    -Helpful to do interviews  -Daughter's picnic: :     -Did that on Tuesday too and was busy. Got to see other friends   -[facility] dad: On June 1st.    -How was it: Went well. They talked with dad and about how he didn't want to leave. They went to her dad's [employer] and the  prayed over them. Laura, , , sister and her family.    -How are you feeling afterwards: Ok. She went on his facebook.    -Another time laura talked to her brother about it and what happened as Laura was in the hospital. Provided a bit of closure.     -Post Psychotic Episode symptom checklist  A little bit. Flashbacks of it once a day.    -It was a lot of trauma. Nothing provokes it necessarily.   2. No dreams  3. No-no reliving.   4. No- not really  5. Physical symptoms-sometimes when she remembers what happens, she gets scared about it happening again. Heart races, has to clear throat.    -Tries to pray. It helps for the most part.    -Not sure how often. Probably  once per day.   6. Avoidance-yes. Tries to disctract self and keep self busy, do something else.   7. Avoiding activities or situations because they remind her of what happened.    -Sometimes scared to be around people because not sure if it will happen.     -If she can go out in public with someone then she prefers.    -Even before episode, she didn't like driving or going in places alone. Would prefer being around people.    -Agrees there might be social anxiety. Interactions with people.    -Since the episode, it's been a lot harder to go out in public.     -Does  know how difficult it is: Not sure if  knows but he does know that around people she is more shy.   8. Trouble remembering parts of what happened with the episode: Doesn't forget it. Out of it, so portions she doesn't remember  9. Loss of interest in activities she used to enjoy before the episode: Was more of a home body so she didn't do too many things any way.   10. Feelings of being more distant or cut off from people: A little bit. Had a friend that she knew for a while-reduced the way they used to chat and talk. With her siblings, she feels more distant. It's because they are busy and doing their own things.    -With friend, check in every once in a while. That person knows. Feels like starting over.    -Brought Laura closer to her   11. Feelings of emotionally numb-no. Sometimes question if she is still the same. Will ask  if she is the same, would she do this before.  says she's the same.    -A lot of doubt if she's changed  12. Feelings of future being cut short-since episode , more scared of future and that things can happen. Fear of it happening again and general fear that something bad could happen. These are frequent.   13. Ability to fall or stay asleep- has not affected this.   14. Irritable/Angry outbursts: no  15. Difficulty concentrating: no  16. Hypervigilant/watchful/alert: yes-tries to check in with self. Frequently, every day.   17. Jump, easily startled-a little bit.     Did the 5-4-3-2-1. Laura asked for the activity-and I shared it with her on [employer]. Discussed this could be a fun one to do with kids as wel..     Plan:  -I will reach out about  "NAVIGTATE writing progam and critique [place] and to reach out.     13. Plan/Referrals:   -Continue to work on processing the episode, wellness plan.   -Follow up next week.     Billing for \"Interactive Complexity\"?    No    Monique Jeffrey MD    PGY-3 Psychiatry  [facility]   NAVIGATE Individual Resiliency Traine    "

## 2025-06-06 ENCOUNTER — VIRTUAL VISIT (OUTPATIENT)
Dept: PSYCHIATRY | Facility: CLINIC | Age: 35
End: 2025-06-06
Payer: COMMERCIAL

## 2025-06-06 DIAGNOSIS — F29 PSYCHOSIS, UNSPECIFIED PSYCHOSIS TYPE (H): Primary | ICD-10-CM

## 2025-06-13 ENCOUNTER — VIRTUAL VISIT (OUTPATIENT)
Dept: PSYCHIATRY | Facility: CLINIC | Age: 35
End: 2025-06-13
Payer: COMMERCIAL

## 2025-06-13 DIAGNOSIS — F29 PSYCHOSIS, UNSPECIFIED PSYCHOSIS TYPE (H): Primary | ICD-10-CM

## 2025-06-13 NOTE — PROGRESS NOTES
NAVIGJESUS Clinician Contact & Progress Note  For Individual Resiliency Training (IRT)  A Part of the [facility] First Episode of Psychosis Program    NAVIGATE Enrollee: Laura Webb (1990)     MRN: [id number]  Date:  6/13/25  Diagnosis: Psychosis, unspecified,  PTSD  Clinician: MILAN Individual Resiliency Trainer, Monique Jeffrey MD     1. Type of contact: (majority of time spent)  IRT Session via telehealth  Mode of communication: American Well (HIPAA compliant, secure platform). Patient consented verbally to this mode of therapy today.  Reason for telehealth: To reduce barriers in accessing services, due to but not limited to transportation, location, or scheduling reasons.    2. People present:   Writer  Client: Yes - presented alone.     3. Length of Actual Contact: Start Time: 9:40; End Time: 10:10     4. Location of contact:  Originating Location (patient location): located at home in [place], [place]  Distant Location (provider location): [place], [place]    5. Did the client complete the home practice option(s) from the previous session: Not Applicable    6. Motivational Teaching Strategies:  Connect info and skills with personal goals    7. Educational Teaching Strategies:  Review of written material/education  Relate information to client's experience  Ask questions to check comprehension  Break down information into small chunks    8. CBT Teaching Strategies:  Reinforcement and shaping (positive feedback for steps towards goals and follow-through on home assignments)    9. IRT Module(s) Addressed:  Process the Episode     10. Techniques utilized:   [place] announced at beginning of session  Review of homework  Review of goal  Review of previous meeting  Present new material    11. Measures:    Mental Status Exam  Alertness: alert  and oriented  Behavior/Demeanor: some guarding but overall much less compared to initial visits  Speech: some latency of response and regular rate and  rhythm  Language: no obvious problem.   Mood: good  Affect: some restriction in range; congruent with mood;   Thought Process/Associations: linear, goal directed  Thought Content:  Reports none; did not endorse any active SI or HI  Insight: fair-good   Judgment: good  Cognition: does  appear grossly intact; formal cognitive testing was not done    12. Assessment/Progress Note:     Check in  Processing the episode    Check In:   -Things have been good.   -Daughter has been home now.    -Good. Grandparents are here too.    -Relationship with grandparents ok. No tension.   -Positive things in the past week: Not really. Same old.  -Stressful: No.   -Job interview: Didn't get the job. That's ok. They just gave generic feedback.    -Doing ok with it.    -Applying to more jobs.   -No other updates.     Upsetting Psychotic Symptoms;   -Believing believing plotting: No more suspecion.   -Afraid of losing mind and losing touch with reality: yes.   -Hearing voices say bad things: No  -Doing strannge, violent, aggressive or embarrassing things: Yes  -Beleiving people or groups want to hurt me: no  -Putting myself in danger: no  -Hurting myself: np  -Frighting hallucinations: Yes  -Forces outside of me maknig me hurt myself; no  -Anything else: no    -Most upsetting ones: all upsetting; all happening at he same time.     Upsetting Treatment Experiences;  -Forcible taken: Wanted to go but then getting there didn't want to stay.   -Frightening or hurtful treatment: ECT.: Everything with this. Especially leading up to the procedure.   -Physically restraint or secluded: Yes. Went through a period of isolation.   -Serious problems or side effects related to medications: Stiff neck, tremors. That's why she was taking cogentin. Cogentin helped.    -Medication was affecting body. Made blood pressure drop. So they took her off of it.   -Feeling embarrassed to be seen in hospital or emergency room: Embarassment with loss of control with  "what she was doing .     -A fear of losing control again.  -Forced to take medication; no  -Didn't really come out and didn't see anyone until she was getting better. More worried about the workers.    -Worried if the workers were ok.   -Feeling threatened by a treatment provider; no  -No other upsetting hospital experiences.   -Going through these checklists; no reaction.     With Auralie:    -At the time she was writing down what she went through.     Self Stigmatizing Check list:   -I will never get better or recover.   -I am to blame for what happened; No  -I am crazy and always will be: no  -I can't trust myself because of what happened: Parts of this is true.    -Because of the way she lost so much control of self doesn't really trust self.   - I cannot be trusted because of what happened: No  -I have no control over my actions now: No   -I'm unpredictable or dangerous: No  -I am to get or keep a regular job;: No  - I will be able to hve meaningful relationships or a family; Maybe with friendships. But otherwise no.   - I will lost control at any moment; No.   -Unable to care for family because of what happened: No .   -Lost of control:    -Goes places alone. Get's worried about going places alone.     -Worrying looks like constant.    -Whenever she's doing something, just worries about it.    -Hasn't really affected how she thinks of her self as a mom at all.     -Tried the 5-4-3-2-1 outside and wasn't really that helpful. Prayer has been the most helpful coping skill.     -Did not want to review karime's story since she went through this before    Plan:  -I will reach out to becky about NAVIGATE writing progam and critique [place] and to reach out.   -HW : Laura will review the questions for before the episode which we will go through next time.     13. Plan/Referrals:   -Continue to work on processing the episode, wellness plan.   -Follow up next week.     Billing for \"Interactive Complexity\"?  "   Aisha Jeffrey MD    PGY-3 Psychiatry  [facility]   NAVIGATE Individual Resiliency Trainer

## 2025-06-17 NOTE — PROGRESS NOTES
"Virtual Visit Details    Originating Location (pt. Location): Home    Distant Location (provider location):  On-site  Platform used for Video Visit: FAST FELT Medication Management Progress Note  A Part of the [facility] First Episode of Psychosis Program    NAVIGATE Enrollee: Laura Webb (1990)     MRN: [id number]  Date:  6/20/25         Contributors to the Assessment     Chart Reviewed.   Interview completed with Laura Webb.  Collateral information obtained from none.         Chief Complaint     \"Doing OK\"         Interim History    Laura Webb is a 34 year old female who was last seen one month ago at which time no changes were made. The patient reports good treatment adherence.  History was provided by Laura who was a good historian.  Since the last visit:  -Cogentin--stopped taking it about one week ago, has not had any stiffness return or any new concerns. Waiting to see if these return and if she needs to restart it.  -Helping out at a wedding coming up--this is fun and exciting but also stressful at times.  -Hasn't had any panic attacks recently.  -Feeling hopeful about stopping the injection. Has been talking to Monique about her writing and how it helps her process the episode.  -no changes in sleep or appetite. Still no menstrual period, no changes there.    RECENT SOCIAL HISTORY:  SEE HPI    Medical ROS:  none         First Episode of Psychosis History      DUP (duration untreated psychosis):  several days to eight months  Route to initial care: hospitalization  Medication adherence overall:  See above, Clinician Rating Form in COMPASS Item 13  General frequency of visits:  monthly  Participation in groups:  No    Reviewed for completion of First Episode work-up:  Yes  First episode workup:  Not Done (if completed, see LABS for results)  MATRICS Consensus Cognitive Battery:  Not Done (if completed, see LABS for results)       Medical/Surgical History     Blood-group specific " "substance    Patient Active Problem List   Diagnosis    Psychosis, unspecified psychosis type (H)          Medications     Current Outpatient Medications   Medication Sig Dispense Refill    benztropine (COGENTIN) 1 MG tablet Take 1 tablet (1 mg) by mouth daily. 30 tablet 4    paliperidone (INVEGA SUSTENNA) 39 MG/0.25ML TERRELL Inject 0.25 mLs (39 mg) into the muscle every 28 days. 0.25 mL 2          Vitals     There were no vitals taken for this visit.          Mental Status Exam     Alertness: alert  and oriented  Appearance: well groomed  Behavior/Demeanor: pleasant and calm, with good  eye contact  Speech: normal;   Language: no obvious problem  Psychomotor: normal or unremarkable  Mood: \"good\"  Affect: restricted; was congruent to mood; was congruent to content  Thought Process/Associations: unremarkable  Thought Content:  Reports none;  Denies suicidal and violent ideation and delusions  Perception:  Reports none;  Denies auditory hallucinations and visual hallucinations  Insight: good  Judgment: good, adequate for safety  Cognition: does  appear grossly intact; formal cognitive testing was not done         Labs and Data     RATING SCALES: AIMS done 5/9/25 with score of 0    PHQ9 TODAY =       6/14/2024     8:11 AM 7/19/2024     9:30 AM 5/9/2025    11:00 AM   PHQ-9 SCORE   PHQ-9 Total Score 3 0 0       ANTIPSYCHOTIC LABS ROUTINE    [glu, A1C, lipids (focus LDL), liver enzymes, WBC, ANEU, Hgb, plts]   q12 mo  Recent Labs   Lab Test 05/09/25  1126   A1C 5.5     Recent Labs   Lab Test 05/09/25  1126   CHOL 146   TRIG 46   LDL 74   HDL 63     No lab results found.  Recent Labs   Lab Test 05/09/25  1126   WBC 6.3   ANEU 4.0   HGB 11.0*          Narrative & Impression   EXAM: MR BRAIN W/O and W CONTRAST  LOCATION: [facility]  DATE: 8/22/2023     INDICATION:  Psychosis, unspecified psychosis type (H)  COMPARISON: None.  CONTRAST: 7ml Gadavist  TECHNIQUE: Routine multiplanar multisequence " head MRI without and with intravenous contrast.     FINDINGS:  INTRACRANIAL CONTENTS: No acute or subacute infarct. No mass, acute hemorrhage, or extra-axial fluid collections. Normal brain parenchymal signal. Normal ventricles and sulci. Normal position of the cerebellar tonsils. No pathologic contrast enhancement.   Posterior fossa structures including cerebellar hemispheres, fourth ventricle and CP angle cisterns are clear. Nasopharynx is clear. Region of the sella and suprasellar cistern are clear.     SELLA: No abnormality accounting for technique.     OSSEOUS STRUCTURES/SOFT TISSUES: Normal marrow signal. The major intracranial vascular flow voids are maintained.      ORBITS: No abnormality accounting for technique.      SINUSES/MASTOIDS: No paranasal sinus mucosal disease. No middle ear or mastoid effusion.                                                                    IMPRESSION:  1.  Normal head MRI.            Psychiatric Diagnoses     Schizophreniform disorder, with positive prognostic features  H/o catatonia  Likely PTSD         Assessment     From intake, 8/4/23: Laura Webb is a 32 year old  Black or  Not  or  female who presented for a comprehensive assessment of psychiatric symptoms by the First Episode of Psychosis Navigate Program. Diagnostically challenging given single-episode of mental illness. Differential is broad including psychosis due to medical condition (seizure), bipolar disorder, MDD single episode with psychotic features. Substances do not appear to be a contributing factor. Schizophrenia also considered, though unlikely given rapid-onset of her symptoms and high cognitive and psychosocial functioning following her first episode / hospitalization. Her chart review and interview today present as most consistent with schizophreniform disorder. Prognosis is fair-to-good. Rapid onset of symptoms is positive predictive factor for resolution of  symptoms / return to baseline.   We discussed this assessment with her today. We discussed our recommendations: to continue current medications, complete her medical / neurological workup to rule-out underlying epileptic and autoimmune etiologies. Recommend continuing long-acting-injectable for now, ideally for 1 year following resolution of her psychotic symptoms. She expressed understanding of this plan.      TODAY Laura reports interim stability. No movement problems, mood symptoms, anxiety, or psychosis. After her last injection she told them that she was planning to stop soon when they told her she needed more orders. She proposed seeing how she feels the week before the injection and scheduling it if needed, which I feel is appropriate. She has already stopped the Cogentin with no ill effects as of yet.    SUICIDE RISK ASSESSMENT-  Risk factors for self-harm: none.  Mitigating factors: no h/o suicide attempt, no plan or intent, describes a safety plan, h/o seeking help , future oriented, commitment to family, good social support  , stable housing, stable finances, and Presybeterian beliefs.  The patient does not appear to be at imminent risk for self-harm, hospitalization is not recommended which the pt does  agree with. No hospitalization will be arranged. Based on degree of symptoms close psych follow-up was/were recommended which the pt does  agree to.     PSYCHOTROPIC DRUG INTERACTIONS:   None.  MANAGEMENT:  N/A         Plan   1) Medications   - Stop Invega Sustenna 39 mg IM q28 days (gets this at [facility]--orders on file, but paused for now)  - benztropine 1 mg PO daily already discontinued     2) Therapy- continue weekly IRT with Monique Jeffrey MD     3) Next Due   Labs-  ordered and pending  EKG- last completed 4/20/23, repeat if increasing or adding Qtc- prolonging medications   Rating scales- AIMS: due      4) Referrals  Medical concerns- seizure history, neurology referral for EEG declined by  patient       5) RTC: 1 month.      6) Crisis Numbers:   Provided routinely in AVS     After hours:  [phone]    TREATMENT RISK STATEMENT:  The risks, benefits, alternatives and potential adverse effects have been discussed and are understood by the pt. The pt understands the risks of using street drugs or alcohol. There are no medical contraindications, the pt agrees to treatment with the ability to do so. The pt knows to call the clinic for any problems or to access emergency care if needed.  Medical and substance use concerns are documented above.  Psychotropic drug interaction check was done, including changes made today.    Yaneth Moseley MD  Navigate Psychiatrist  , Department of Psychiatry and Behavioral Sciences  [facility]    The longitudinal plan of care for the diagnosis(es)/condition(s) as documented were addressed during this visit. Due to the added complexity in care, I will continue to support Laura in the subsequent management and with ongoing continuity of care.

## 2025-06-20 ENCOUNTER — VIRTUAL VISIT (OUTPATIENT)
Dept: PSYCHIATRY | Facility: CLINIC | Age: 35
End: 2025-06-20
Payer: COMMERCIAL

## 2025-06-20 DIAGNOSIS — F29 PSYCHOSIS, UNSPECIFIED PSYCHOSIS TYPE (H): ICD-10-CM

## 2025-06-20 DIAGNOSIS — F43.10 PTSD (POST-TRAUMATIC STRESS DISORDER): Primary | ICD-10-CM

## 2025-06-20 NOTE — NURSING NOTE
Virtual Visit Details    Type of service:  Video Visit   Video Start Time:   Video End Time:    Originating Location (pt. Location): Home    Distant Location (provider location):  On-site  Platform used for Video Visit: Paras

## 2025-06-20 NOTE — NURSING NOTE
NAVIGATE Patient Self-Rating Form    Since your last medication management visit--    Have you been feeling depressed, sad, or down? No  Have you been feeling anxious, worried or nervous? No  Have you been thinking about death or have you had any feelings that you would be better off dead? No   Have you been feeling particularly good? No  Have you been feeling annoyed, angry, or resentful (whether you showed it or not)? No  Did you do anything that could have gotten you in trouble? No  Have you felt dizzy or faint? No  Have you had blurred vision? No  Have you had dry mouth? No  Have you had too much saliva in your mouth or had drooling? No  Have you felt nauseous? No  Have you been constipated? No  Has you appetite for food been increased? No  Have you gained weight? No  Have you lost weight? No  Have you felt restless or like you cannot sit still? No  Any shaking of your hands, legs, or other muscles? No  Any problems walking or moving or any problems feeling stiff or rigid? No  Have you felt tired or fatigued? No  Have you felt drowsy during the day? No  Have you been sleeping too much at night? No  Have you been sleeping too little or had problems sleeping at night? No  Any decrease in your interest in sex? No  Any other problems with sex? No  Any problems with your breasts such as swelling or discharge? No  For women, any problems with your period? Yes  Are there other medical or side effect problems you wish to discuss with your prescriber? No  Since your last visit, how many days have you not taken your medication? 0  Have you had trouble remembering to take your medication? No  Do you find the number of medicines or the times when you are supposed to take then confusing or burdensome? No  Are you afraid of the medication? No  Do you think that you have an illness that requires taking medication? Yes  Do you think that other people would think poorly of you if they knew that you take medication? No  On  average, how many cigarettes do you smoke per day? 0  Since you last visit, did you drink alcohol? No  Since your last visit, have you used any marijuana? No  Since your last visit, have you used any stress drugs other than marijuana? No  Between now and your next visit, do you think we should keep your medication the same or consider changing the medications? Keep the same

## 2025-07-15 ENCOUNTER — TELEPHONE (OUTPATIENT)
Dept: PSYCHIATRY | Facility: CLINIC | Age: 35
End: 2025-07-15
Payer: COMMERCIAL

## 2025-07-16 ENCOUNTER — VIRTUAL VISIT (OUTPATIENT)
Dept: PSYCHIATRY | Facility: CLINIC | Age: 35
End: 2025-07-16
Payer: COMMERCIAL

## 2025-07-16 DIAGNOSIS — F43.10 PTSD (POST-TRAUMATIC STRESS DISORDER): ICD-10-CM

## 2025-07-16 DIAGNOSIS — F29 PSYCHOSIS, UNSPECIFIED PSYCHOSIS TYPE (H): Primary | ICD-10-CM

## 2025-07-16 NOTE — PROGRESS NOTES
"Virtual Visit Details    Originating Location (pt. Location): Home  {PROVIDER LOCATION On-site should be selected for visits conducted from your clinic location or adjoining [facility] hospital, academic office, or other nearby [facility] building. Off-site should be selected for all other provider locations, including home:[id number]}  Distant Location (provider location):  On-site  Platform used for Video Visit: [employer]    NAVIGATE Medication Management Progress Note  A Part of the [facility] First Episode of Psychosis Program    NAVIGATE Enrollee: Laura Webb (1990)     MRN: [id number]  Date:  7/16/25         Contributors to the Assessment     Chart Reviewed.   Interview completed with Laura Webb.  Collateral information obtained from none.         Chief Complaint     \"Doing OK\"         Interim History    Laura Webb is a 34 year old female who was last seen one month ago at which time no changes were made. The patient reports good treatment adherence.  History was provided by Laura who was a good historian.  Since the last visit:  -No changes with mood or anxiety  -helping out with a wedding, this went well  -denies any changes to     RECENT SOCIAL HISTORY:  SEE HPI    Medical ROS:  none         First Episode of Psychosis History      DUP (duration untreated psychosis):  several days to eight months  Route to initial care: hospitalization  Medication adherence overall:  See above, Clinician Rating Form in COMPASS Item 13  General frequency of visits:  monthly  Participation in groups:  No    Reviewed for completion of First Episode work-up:  Yes  First episode workup:  Not Done (if completed, see LABS for results)  MATRICS Consensus Cognitive Battery:  Not Done (if completed, see LABS for results)       Medical/Surgical History     Blood-group specific substance    Patient Active Problem List   Diagnosis    Psychosis, unspecified psychosis type (H)          Medications     Current Outpatient Medications " "  Medication Sig Dispense Refill    benztropine (COGENTIN) 1 MG tablet Take 1 tablet (1 mg) by mouth daily. (Patient not taking: Reported on 7/16/2025) 30 tablet 4    paliperidone (INVEGA SUSTENNA) 39 MG/0.25ML TERRELL Inject 0.25 mLs (39 mg) into the muscle every 28 days. (Patient not taking: Reported on 7/16/2025)            Vitals     There were no vitals taken for this visit.          Mental Status Exam     Alertness: alert  and oriented  Appearance: well groomed  Behavior/Demeanor: pleasant and calm, with good  eye contact  Speech: normal;   Language: no obvious problem  Psychomotor: normal or unremarkable  Mood: \"good\"  Affect: restricted; was congruent to mood; was congruent to content  Thought Process/Associations: unremarkable  Thought Content:  Reports none;  Denies suicidal and violent ideation and delusions  Perception:  Reports none;  Denies auditory hallucinations and visual hallucinations  Insight: good  Judgment: good, adequate for safety  Cognition: does  appear grossly intact; formal cognitive testing was not done         Labs and Data     RATING SCALES: AIMS done 5/9/25 with score of 0    PHQ9 TODAY =       6/14/2024     8:11 AM 7/19/2024     9:30 AM 5/9/2025    11:00 AM   PHQ-9 SCORE   PHQ-9 Total Score 3 0 0       ANTIPSYCHOTIC LABS ROUTINE    [glu, A1C, lipids (focus LDL), liver enzymes, WBC, ANEU, Hgb, plts]   q12 mo  Recent Labs   Lab Test 05/09/25  1126   A1C 5.5     Recent Labs   Lab Test 05/09/25  1126   CHOL 146   TRIG 46   LDL 74   HDL 63     No lab results found.  Recent Labs   Lab Test 05/09/25  1126   WBC 6.3   ANEU 4.0   HGB 11.0*          Narrative & Impression   EXAM: MR BRAIN W/O and W CONTRAST  LOCATION: [facility]  DATE: 8/22/2023     INDICATION:  Psychosis, unspecified psychosis type (H)  COMPARISON: None.  CONTRAST: 7ml Gadavist  TECHNIQUE: Routine multiplanar multisequence head MRI without and with intravenous contrast.   "   FINDINGS:  INTRACRANIAL CONTENTS: No acute or subacute infarct. No mass, acute hemorrhage, or extra-axial fluid collections. Normal brain parenchymal signal. Normal ventricles and sulci. Normal position of the cerebellar tonsils. No pathologic contrast enhancement.   Posterior fossa structures including cerebellar hemispheres, fourth ventricle and CP angle cisterns are clear. Nasopharynx is clear. Region of the sella and suprasellar cistern are clear.     SELLA: No abnormality accounting for technique.     OSSEOUS STRUCTURES/SOFT TISSUES: Normal marrow signal. The major intracranial vascular flow voids are maintained.      ORBITS: No abnormality accounting for technique.      SINUSES/MASTOIDS: No paranasal sinus mucosal disease. No middle ear or mastoid effusion.                                                                    IMPRESSION:  1.  Normal head MRI.            Psychiatric Diagnoses     Schizophreniform disorder, with positive prognostic features  H/o catatonia  Likely PTSD         Assessment     From intake, 8/4/23: Laura Webb is a 32 year old  Black or  Not  or  female who presented for a comprehensive assessment of psychiatric symptoms by the First Episode of Psychosis Navigate Program. Diagnostically challenging given single-episode of mental illness. Differential is broad including psychosis due to medical condition (seizure), bipolar disorder, MDD single episode with psychotic features. Substances do not appear to be a contributing factor. Schizophrenia also considered, though unlikely given rapid-onset of her symptoms and high cognitive and psychosocial functioning following her first episode / hospitalization. Her chart review and interview today present as most consistent with schizophreniform disorder. Prognosis is fair-to-good. Rapid onset of symptoms is positive predictive factor for resolution of symptoms / return to baseline.   We discussed this  assessment with her today. We discussed our recommendations: to continue current medications, complete her medical / neurological workup to rule-out underlying epileptic and autoimmune etiologies. Recommend continuing long-acting-injectable for now, ideally for 1 year following resolution of her psychotic symptoms. She expressed understanding of this plan.      TODAY Laura reports interim stability. No movement problems, mood symptoms, anxiety, or psychosis. After her last injection she told them that she was planning to stop soon when they told her she needed more orders. She proposed seeing how she feels the week before the injection and scheduling it if needed, which I feel is appropriate. She has already stopped the Cogentin with no ill effects as of yet.    SUICIDE RISK ASSESSMENT-  Risk factors for self-harm: none.  Mitigating factors: no h/o suicide attempt, no plan or intent, describes a safety plan, h/o seeking help , future oriented, commitment to family, good social support  , stable housing, stable finances, and [employer] beliefs.  The patient does not appear to be at imminent risk for self-harm, hospitalization is not recommended which the pt does  agree with. No hospitalization will be arranged. Based on degree of symptoms close psych follow-up was/were recommended which the pt does  agree to.     PSYCHOTROPIC DRUG INTERACTIONS:   None.  MANAGEMENT:  N/A         Plan   1) Medications   - Stop Invega Sustenna 39 mg IM q28 days (gets this at [place]--orders on file, but paused for now)  - benztropine 1 mg PO daily already discontinued     2) Therapy- continue weekly IRT with Monique Jeffrey MD     3) Next Due   Labs- ordered and pending  EKG- last completed 4/20/23, repeat if increasing or adding Qtc- prolonging medications   Rating scales- AIMS: due      4) Referrals  Medical concerns- seizure history, neurology referral for EEG declined by patient       5) RTC: 1 month.      6) Crisis Numbers:    Provided routinely in AVS     After hours:  [phone]    TREATMENT RISK STATEMENT:  The risks, benefits, alternatives and potential adverse effects have been discussed and are understood by the pt. The pt understands the risks of using street drugs or alcohol. There are no medical contraindications, the pt agrees to treatment with the ability to do so. The pt knows to call the clinic for any problems or to access emergency care if needed.  Medical and substance use concerns are documented above.  Psychotropic drug interaction check was done, including changes made today.    Yaneth Moseley MD  Navigate Psychiatrist  , Department of Psychiatry and Behavioral Sciences  [facility]    The longitudinal plan of care for the diagnosis(es)/condition(s) as documented were addressed during this visit. Due to the added complexity in care, I will continue to support Laura in the subsequent management and with ongoing continuity of care.

## 2025-07-16 NOTE — NURSING NOTE
NAVIGATE Patient Self-Rating Form    Since your last medication management visit--    Have you been feeling depressed, sad, or down? No  Have you been feeling anxious, worried or nervous? No  Have you been thinking about death or have you had any feelings that you would be better off dead? No   Have you been feeling particularly good? No  Have you been feeling annoyed, angry, or resentful (whether you showed it or not)? No  Did you do anything that could have gotten you in trouble? No  Have you felt dizzy or faint? No  Have you had blurred vision? No  Have you had dry mouth? No  Have you had too much saliva in your mouth or had drooling? No  Have you felt nauseous? No  Have you been constipated? No  Has you appetite for food been increased? No  Have you gained weight? No  Have you lost weight? No  Have you felt restless or like you cannot sit still? No  Any shaking of your hands, legs, or other muscles? No  Any problems walking or moving or any problems feeling stiff or rigid? No  Have you felt tired or fatigued? Yes  Have you felt drowsy during the day? No  Have you been sleeping too much at night? No  Have you been sleeping too little or had problems sleeping at night? No  Any decrease in your interest in sex? No  Any other problems with sex? No  Any problems with your breasts such as swelling or discharge? No  For women, any problems with your period? No  Are there other medical or side effect problems you wish to discuss with your prescriber? No  Since your last visit, how many days have you not taken your medication? Not taking currently  Have you had trouble remembering to take your medication? NA  Do you find the number of medicines or the times when you are supposed to take then confusing or burdensome? NA  Are you afraid of the medication? NA  Do you think that you have an illness that requires taking medication? N/A  Do you think that other people would think poorly of you if they knew that you take  medication? N/A  On average, how many cigarettes do you smoke per day? 0  Since you last visit, did you drink alcohol? No  Since your last visit, have you used any marijuana? No  Since your last visit, have you used any stress drugs other than marijuana? No  Between now and your next visit, do you think we should keep your medication the same or consider changing the medications? Keep the same